# Patient Record
Sex: MALE | Race: WHITE | Employment: OTHER | URBAN - METROPOLITAN AREA
[De-identification: names, ages, dates, MRNs, and addresses within clinical notes are randomized per-mention and may not be internally consistent; named-entity substitution may affect disease eponyms.]

---

## 2018-09-17 LAB
AVERAGE GLUCOSE: NORMAL
CHOLESTEROL, TOTAL: 154 MG/DL
CHOLESTEROL/HDL RATIO: 4.3
HBA1C MFR BLD: 6.1 %
HDLC SERPL-MCNC: 36 MG/DL (ref 35–70)
LDL CHOLESTEROL CALCULATED: 86 MG/DL (ref 0–160)
TRIGL SERPL-MCNC: 160 MG/DL
VLDLC SERPL CALC-MCNC: 32 MG/DL

## 2019-02-26 ENCOUNTER — APPOINTMENT (OUTPATIENT)
Dept: CT IMAGING | Age: 69
End: 2019-02-26
Payer: COMMERCIAL

## 2019-02-26 ENCOUNTER — HOSPITAL ENCOUNTER (EMERGENCY)
Age: 69
Discharge: HOME | End: 2019-02-26
Attending: EMERGENCY MEDICINE
Payer: COMMERCIAL

## 2019-02-26 VITALS
RESPIRATION RATE: 16 BRPM | TEMPERATURE: 98.1 F | HEIGHT: 74 IN | BODY MASS INDEX: 40.43 KG/M2 | HEART RATE: 85 BPM | SYSTOLIC BLOOD PRESSURE: 124 MMHG | OXYGEN SATURATION: 100 % | WEIGHT: 315 LBS | DIASTOLIC BLOOD PRESSURE: 102 MMHG

## 2019-02-26 DIAGNOSIS — H53.9 VISUAL DISTURBANCE: Primary | ICD-10-CM

## 2019-02-26 LAB
ALBUMIN SERPL-MCNC: 3.7 GM/DL (ref 3.4–5)
ALP BLD-CCNC: 44 IU/L (ref 40–129)
ALT SERPL-CCNC: 16 U/L (ref 10–40)
ANION GAP SERPL CALCULATED.3IONS-SCNC: 11 MMOL/L (ref 4–16)
APTT: 30.3 SECONDS (ref 21.2–33)
AST SERPL-CCNC: 24 IU/L (ref 15–37)
BASOPHILS ABSOLUTE: 0.1 K/CU MM
BASOPHILS RELATIVE PERCENT: 0.5 % (ref 0–1)
BILIRUB SERPL-MCNC: 1.1 MG/DL (ref 0–1)
BUN BLDV-MCNC: 14 MG/DL (ref 6–23)
CALCIUM SERPL-MCNC: 8.4 MG/DL (ref 8.3–10.6)
CHLORIDE BLD-SCNC: 101 MMOL/L (ref 99–110)
CO2: 25 MMOL/L (ref 21–32)
CREAT SERPL-MCNC: 0.8 MG/DL (ref 0.9–1.3)
DIFFERENTIAL TYPE: ABNORMAL
EOSINOPHILS ABSOLUTE: 0.1 K/CU MM
EOSINOPHILS RELATIVE PERCENT: 1.3 % (ref 0–3)
GFR AFRICAN AMERICAN: >60 ML/MIN/1.73M2
GFR NON-AFRICAN AMERICAN: >60 ML/MIN/1.73M2
GLUCOSE BLD-MCNC: 106 MG/DL (ref 70–99)
HCT VFR BLD CALC: 52.3 % (ref 42–52)
HEMOGLOBIN: 16.8 GM/DL (ref 13.5–18)
IMMATURE NEUTROPHIL %: 0.4 % (ref 0–0.43)
INR BLD: 2.44 INDEX
LYMPHOCYTES ABSOLUTE: 3 K/CU MM
LYMPHOCYTES RELATIVE PERCENT: 32.6 % (ref 24–44)
MCH RBC QN AUTO: 30.8 PG (ref 27–31)
MCHC RBC AUTO-ENTMCNC: 32.1 % (ref 32–36)
MCV RBC AUTO: 95.8 FL (ref 78–100)
MONOCYTES ABSOLUTE: 0.8 K/CU MM
MONOCYTES RELATIVE PERCENT: 8.6 % (ref 0–4)
NUCLEATED RBC %: 0 %
PDW BLD-RTO: 13.2 % (ref 11.7–14.9)
PLATELET # BLD: 224 K/CU MM (ref 140–440)
PMV BLD AUTO: 9.2 FL (ref 7.5–11.1)
POTASSIUM SERPL-SCNC: 3.8 MMOL/L (ref 3.5–5.1)
PROTHROMBIN TIME: 28.1 SECONDS (ref 9.12–12.5)
RBC # BLD: 5.46 M/CU MM (ref 4.6–6.2)
SEGMENTED NEUTROPHILS ABSOLUTE COUNT: 5.2 K/CU MM
SEGMENTED NEUTROPHILS RELATIVE PERCENT: 56.6 % (ref 36–66)
SODIUM BLD-SCNC: 137 MMOL/L (ref 135–145)
TOTAL IMMATURE NEUTOROPHIL: 0.04 K/CU MM
TOTAL NUCLEATED RBC: 0 K/CU MM
TOTAL PROTEIN: 6.8 GM/DL (ref 6.4–8.2)
WBC # BLD: 9.1 K/CU MM (ref 4–10.5)

## 2019-02-26 PROCEDURE — 85730 THROMBOPLASTIN TIME PARTIAL: CPT

## 2019-02-26 PROCEDURE — 80053 COMPREHEN METABOLIC PANEL: CPT

## 2019-02-26 PROCEDURE — 70450 CT HEAD/BRAIN W/O DYE: CPT

## 2019-02-26 PROCEDURE — 99284 EMERGENCY DEPT VISIT MOD MDM: CPT

## 2019-02-26 PROCEDURE — 93005 ELECTROCARDIOGRAM TRACING: CPT | Performed by: EMERGENCY MEDICINE

## 2019-02-26 PROCEDURE — 93010 ELECTROCARDIOGRAM REPORT: CPT | Performed by: INTERNAL MEDICINE

## 2019-02-26 PROCEDURE — 85610 PROTHROMBIN TIME: CPT

## 2019-02-26 PROCEDURE — 36415 COLL VENOUS BLD VENIPUNCTURE: CPT

## 2019-02-26 PROCEDURE — 85025 COMPLETE CBC W/AUTO DIFF WBC: CPT

## 2019-03-04 LAB
EKG ATRIAL RATE: 288 BPM
EKG DIAGNOSIS: NORMAL
EKG Q-T INTERVAL: 426 MS
EKG QRS DURATION: 96 MS
EKG QTC CALCULATION (BAZETT): 460 MS
EKG R AXIS: -39 DEGREES
EKG T AXIS: 28 DEGREES
EKG VENTRICULAR RATE: 70 BPM

## 2019-03-07 ENCOUNTER — HOSPITAL ENCOUNTER (OUTPATIENT)
Dept: ULTRASOUND IMAGING | Age: 69
Discharge: HOME | End: 2019-03-07
Payer: COMMERCIAL

## 2019-03-07 DIAGNOSIS — I10 BENIGN HYPERTENSION: ICD-10-CM

## 2019-03-07 DIAGNOSIS — H53.9 UNSPECIFIED VISUAL DISTURBANCE: ICD-10-CM

## 2019-03-07 PROCEDURE — 93880 EXTRACRANIAL BILAT STUDY: CPT

## 2019-05-07 DIAGNOSIS — I48.0 PAROXYSMAL ATRIAL FIBRILLATION (HCC): Primary | ICD-10-CM

## 2019-05-07 RX ORDER — LOSARTAN POTASSIUM AND HYDROCHLOROTHIAZIDE 12.5; 5 MG/1; MG/1
2 TABLET ORAL DAILY
Qty: 30 TABLET | Refills: 5 | Status: SHIPPED | OUTPATIENT
Start: 2019-05-07 | End: 2019-05-09 | Stop reason: ALTCHOICE

## 2019-05-07 RX ORDER — POTASSIUM CHLORIDE 20 MEQ/1
20 TABLET, EXTENDED RELEASE ORAL DAILY
Qty: 30 TABLET | Refills: 5 | Status: SHIPPED | OUTPATIENT
Start: 2019-05-07 | End: 2019-10-29 | Stop reason: SDUPTHER

## 2019-05-07 RX ORDER — AMLODIPINE BESYLATE 5 MG/1
5 TABLET ORAL DAILY
Qty: 30 TABLET | Refills: 5 | Status: SHIPPED | OUTPATIENT
Start: 2019-05-07 | End: 2019-10-29 | Stop reason: SDUPTHER

## 2019-05-07 RX ORDER — WARFARIN SODIUM 5 MG/1
5 TABLET ORAL DAILY
COMMUNITY
End: 2019-05-07 | Stop reason: SDUPTHER

## 2019-05-07 RX ORDER — WARFARIN SODIUM 5 MG/1
5 TABLET ORAL DAILY
Qty: 30 TABLET | Refills: 1 | Status: SHIPPED | OUTPATIENT
Start: 2019-05-07 | End: 2019-05-29

## 2019-05-07 NOTE — TELEPHONE ENCOUNTER
Carmen Villavicencio called needing the following prescription refills:   Requested Prescriptions     Pending Prescriptions Disp Refills    amLODIPine (NORVASC) 5 MG tablet 30 tablet 5     Sig: Take 1 tablet by mouth daily    losartan-hydrochlorothiazide (HYZAAR) 50-12.5 MG per tablet 30 tablet 5     Sig: Take 2 tablets by mouth daily    potassium chloride (KLOR-CON M) 20 MEQ extended release tablet 30 tablet 5     Sig: Take 1 tablet by mouth daily    warfarin (COUMADIN) 5 MG tablet       Sig: Take 1 tablet by mouth daily     ([facility])  Medications were reviewed and appropriate refills were sent to the requested pharmacy after provider approval.     Electronically signed by Todd Degroot MA on 5/7/19 at 9:06 AM

## 2019-05-09 RX ORDER — LOSARTAN POTASSIUM AND HYDROCHLOROTHIAZIDE 25; 100 MG/1; MG/1
1 TABLET ORAL DAILY
Qty: 30 TABLET | Refills: 5 | Status: SHIPPED | OUTPATIENT
Start: 2019-05-09 | End: 2019-11-04 | Stop reason: SDUPTHER

## 2019-05-15 ENCOUNTER — NURSE ONLY (OUTPATIENT)
Dept: FAMILY MEDICINE CLINIC | Age: 69
End: 2019-05-15
Payer: COMMERCIAL

## 2019-05-15 DIAGNOSIS — I48.20 CHRONIC ATRIAL FIBRILLATION (HCC): Primary | ICD-10-CM

## 2019-05-15 LAB
INTERNATIONAL NORMALIZATION RATIO, POC: NORMAL
PROTHROMBIN TIME, POC: NORMAL

## 2019-05-15 PROCEDURE — 85610 PROTHROMBIN TIME: CPT | Performed by: FAMILY MEDICINE

## 2019-05-15 NOTE — PROGRESS NOTES
PT INR 1.6, PER DR QUIÑONEZ CHANGE TO 2.5MG EVERY DAY, RECHECK 2 WEEKS.  NOTE FYI: PT HAD KNEE SURGERY 2 WEEKS AGO AND WAS OFF COUMADIN A FEW DAYS  Electronically signed by Hong Vazquez MA on 5/15/2019 at 1:44 PM

## 2019-05-24 DIAGNOSIS — N52.9 IMPOTENCE: ICD-10-CM

## 2019-05-24 DIAGNOSIS — G47.33 OBSTRUCTIVE SLEEP APNEA SYNDROME: ICD-10-CM

## 2019-05-24 DIAGNOSIS — I87.2 PERIPHERAL VENOUS INSUFFICIENCY: ICD-10-CM

## 2019-05-24 DIAGNOSIS — I42.9 CARDIOMYOPATHY, UNSPECIFIED TYPE (HCC): ICD-10-CM

## 2019-05-24 DIAGNOSIS — E78.5 HYPERLIPIDEMIA, UNSPECIFIED HYPERLIPIDEMIA TYPE: ICD-10-CM

## 2019-05-24 DIAGNOSIS — I48.91 ATRIAL FIBRILLATION, UNSPECIFIED TYPE (HCC): ICD-10-CM

## 2019-05-24 DIAGNOSIS — J30.9 ALLERGIC RHINITIS, UNSPECIFIED SEASONALITY, UNSPECIFIED TRIGGER: ICD-10-CM

## 2019-05-24 DIAGNOSIS — R59.0 HILAR LYMPHADENOPATHY: ICD-10-CM

## 2019-05-24 RX ORDER — TURMERIC ROOT EXTRACT 500 MG
1 TABLET ORAL
COMMUNITY

## 2019-05-24 RX ORDER — CHLORAL HYDRATE 500 MG
1 CAPSULE ORAL
COMMUNITY

## 2019-05-24 RX ORDER — AZELASTINE HYDROCHLORIDE 0.5 MG/ML
1 SOLUTION/ DROPS OPHTHALMIC 2 TIMES DAILY
COMMUNITY
End: 2019-11-04 | Stop reason: SDUPTHER

## 2019-05-24 RX ORDER — FLUTICASONE PROPIONATE 50 MCG
2 SPRAY, SUSPENSION (ML) NASAL DAILY
COMMUNITY

## 2019-05-29 ENCOUNTER — NURSE ONLY (OUTPATIENT)
Dept: FAMILY MEDICINE CLINIC | Age: 69
End: 2019-05-29
Payer: COMMERCIAL

## 2019-05-29 DIAGNOSIS — I48.0 PAROXYSMAL ATRIAL FIBRILLATION (HCC): ICD-10-CM

## 2019-05-29 DIAGNOSIS — I48.91 ATRIAL FIBRILLATION, UNSPECIFIED TYPE (HCC): Primary | ICD-10-CM

## 2019-05-29 LAB
INTERNATIONAL NORMALIZATION RATIO, POC: 2.8
PROTHROMBIN TIME, POC: NORMAL

## 2019-05-29 PROCEDURE — 85610 PROTHROMBIN TIME: CPT | Performed by: FAMILY MEDICINE

## 2019-05-29 RX ORDER — WARFARIN SODIUM 5 MG/1
TABLET ORAL
Qty: 30 TABLET | Refills: 0 | Status: SHIPPED | OUTPATIENT
Start: 2019-05-29 | End: 2019-06-25 | Stop reason: SDUPTHER

## 2019-05-29 RX ORDER — WARFARIN SODIUM 5 MG/1
TABLET ORAL
Qty: 30 TABLET | Refills: 0 | Status: SHIPPED | OUTPATIENT
Start: 2019-05-29 | End: 2019-06-26

## 2019-05-29 NOTE — PROGRESS NOTES
INR = 2.8. CURRENT COUMADING DOSAGE IS 2.5MG QD.  LAST INR WAS 1.6. PER DR. QUIÑONEZ, CHANGE TO 5 MG 6 DAYS A WEEK AND RECHECK IN 3 WEEKS. PATIENT VOICED UNDERSTANDING. PATIENT REQUESTED REFILL FOR COUMADIN 5MG BE SENT TO [facility].

## 2019-06-19 ENCOUNTER — NURSE ONLY (OUTPATIENT)
Dept: FAMILY MEDICINE CLINIC | Age: 69
End: 2019-06-19

## 2019-06-19 ENCOUNTER — ANTI-COAG VISIT (OUTPATIENT)
Dept: FAMILY MEDICINE CLINIC | Age: 69
End: 2019-06-19
Payer: COMMERCIAL

## 2019-06-19 DIAGNOSIS — I48.91 ATRIAL FIBRILLATION, UNSPECIFIED TYPE (HCC): ICD-10-CM

## 2019-06-19 LAB
INTERNATIONAL NORMALIZATION RATIO, POC: 2.2
INTERNATIONAL NORMALIZATION RATIO, POC: NORMAL
PROTHROMBIN TIME, POC: 2.2

## 2019-06-19 PROCEDURE — 85610 PROTHROMBIN TIME: CPT | Performed by: FAMILY MEDICINE

## 2019-06-25 DIAGNOSIS — I48.91 ATRIAL FIBRILLATION, UNSPECIFIED TYPE (HCC): Primary | ICD-10-CM

## 2019-06-26 RX ORDER — WARFARIN SODIUM 5 MG/1
TABLET ORAL
Qty: 30 TABLET | Refills: 0 | Status: SHIPPED | OUTPATIENT
Start: 2019-06-26 | End: 2019-07-19 | Stop reason: SDUPTHER

## 2019-06-26 NOTE — TELEPHONE ENCOUNTER
Rupert Arredondo called needing the following prescription refills:   Requested Prescriptions     Pending Prescriptions Disp Refills    warfarin (COUMADIN) 5 MG tablet [Pharmacy Med Name: WARFARIN SODIUM 5 MG TABLET] 30 tablet 0     Sig: TAKE 1/2 TO 1 TABLET DAILY PER INR RESULTS.        Medications were reviewed and appropriate refills were sent to the requested pharmacy after provider approval.     Electronically signed by Dea Armstrong LPN on 9/90/62 at 9:22 AM

## 2019-07-19 DIAGNOSIS — I48.91 ATRIAL FIBRILLATION, UNSPECIFIED TYPE (HCC): ICD-10-CM

## 2019-07-19 RX ORDER — WARFARIN SODIUM 5 MG/1
TABLET ORAL
Qty: 30 TABLET | Refills: 0 | Status: SHIPPED | OUTPATIENT
Start: 2019-07-19 | End: 2019-08-12 | Stop reason: SDUPTHER

## 2019-07-24 ENCOUNTER — NURSE ONLY (OUTPATIENT)
Dept: FAMILY MEDICINE CLINIC | Age: 69
End: 2019-07-24
Payer: COMMERCIAL

## 2019-07-24 DIAGNOSIS — I48.91 ATRIAL FIBRILLATION, UNSPECIFIED TYPE (HCC): ICD-10-CM

## 2019-07-24 LAB
INR BLD: 2.1
INTERNATIONAL NORMALIZATION RATIO, POC: 2.1
PROTHROMBIN TIME, POC: NORMAL

## 2019-07-24 PROCEDURE — 85610 PROTHROMBIN TIME: CPT | Performed by: FAMILY MEDICINE

## 2019-07-24 ASSESSMENT — PATIENT HEALTH QUESTIONNAIRE - PHQ9
1. LITTLE INTEREST OR PLEASURE IN DOING THINGS: 0
SUM OF ALL RESPONSES TO PHQ9 QUESTIONS 1 & 2: 0
SUM OF ALL RESPONSES TO PHQ QUESTIONS 1-9: 0
SUM OF ALL RESPONSES TO PHQ QUESTIONS 1-9: 0
2. FEELING DOWN, DEPRESSED OR HOPELESS: 0

## 2019-08-12 DIAGNOSIS — I48.91 ATRIAL FIBRILLATION, UNSPECIFIED TYPE (HCC): ICD-10-CM

## 2019-08-13 RX ORDER — WARFARIN SODIUM 5 MG/1
TABLET ORAL
Qty: 30 TABLET | Refills: 0 | Status: SHIPPED | OUTPATIENT
Start: 2019-08-13 | End: 2019-11-19 | Stop reason: SDUPTHER

## 2019-08-21 ENCOUNTER — NURSE ONLY (OUTPATIENT)
Dept: FAMILY MEDICINE CLINIC | Age: 69
End: 2019-08-21
Payer: COMMERCIAL

## 2019-08-21 DIAGNOSIS — I48.91 ATRIAL FIBRILLATION, UNSPECIFIED TYPE (HCC): ICD-10-CM

## 2019-08-21 LAB
INTERNATIONAL NORMALIZATION RATIO, POC: 2.2
PROTHROMBIN TIME, POC: NORMAL

## 2019-08-21 PROCEDURE — 85610 PROTHROMBIN TIME: CPT | Performed by: FAMILY MEDICINE

## 2019-08-21 NOTE — PROGRESS NOTES
INR 2.2. CURRENT DOSE OF COUMADIN IS 2.5 MG X 6 DAYS AND NONE ON 7 TH DAY. CONTINUE SAME DOSE AND RECHECK IN 4 WEEKS.

## 2019-09-24 ENCOUNTER — NURSE ONLY (OUTPATIENT)
Dept: FAMILY MEDICINE CLINIC | Age: 69
End: 2019-09-24
Payer: COMMERCIAL

## 2019-09-24 DIAGNOSIS — I48.91 ATRIAL FIBRILLATION, UNSPECIFIED TYPE (HCC): ICD-10-CM

## 2019-09-24 LAB — INTERNATIONAL NORMALIZATION RATIO, POC: 2.3

## 2019-09-24 PROCEDURE — 85610 PROTHROMBIN TIME: CPT | Performed by: FAMILY MEDICINE

## 2019-10-22 ENCOUNTER — ANTI-COAG VISIT (OUTPATIENT)
Dept: FAMILY MEDICINE CLINIC | Age: 69
End: 2019-10-22

## 2019-10-22 ENCOUNTER — NURSE ONLY (OUTPATIENT)
Dept: FAMILY MEDICINE CLINIC | Age: 69
End: 2019-10-22
Payer: COMMERCIAL

## 2019-10-22 DIAGNOSIS — I48.91 ATRIAL FIBRILLATION, UNSPECIFIED TYPE (HCC): ICD-10-CM

## 2019-10-22 LAB
INTERNATIONAL NORMALIZATION RATIO, POC: 2.3
PROTHROMBIN TIME, POC: NORMAL

## 2019-10-22 PROCEDURE — 85610 PROTHROMBIN TIME: CPT | Performed by: FAMILY MEDICINE

## 2019-10-31 RX ORDER — AMLODIPINE BESYLATE 5 MG/1
TABLET ORAL
Qty: 90 TABLET | Refills: 1 | Status: SHIPPED | OUTPATIENT
Start: 2019-10-31 | End: 2019-11-04 | Stop reason: SDUPTHER

## 2019-10-31 RX ORDER — POTASSIUM CHLORIDE 1500 MG/1
TABLET, EXTENDED RELEASE ORAL
Qty: 90 TABLET | Refills: 1 | Status: SHIPPED | OUTPATIENT
Start: 2019-10-31 | End: 2019-11-04 | Stop reason: SDUPTHER

## 2019-11-04 ENCOUNTER — TELEPHONE (OUTPATIENT)
Dept: FAMILY MEDICINE CLINIC | Age: 69
End: 2019-11-04

## 2019-11-04 RX ORDER — AMLODIPINE BESYLATE 5 MG/1
TABLET ORAL
Qty: 30 TABLET | Refills: 0 | Status: SHIPPED | OUTPATIENT
Start: 2019-11-04 | End: 2019-11-19 | Stop reason: SDUPTHER

## 2019-11-04 RX ORDER — LOSARTAN POTASSIUM AND HYDROCHLOROTHIAZIDE 25; 100 MG/1; MG/1
1 TABLET ORAL DAILY
Qty: 30 TABLET | Refills: 0 | Status: SHIPPED | OUTPATIENT
Start: 2019-11-04 | End: 2019-11-19 | Stop reason: SDUPTHER

## 2019-11-04 RX ORDER — AZELASTINE HYDROCHLORIDE 0.5 MG/ML
1 SOLUTION/ DROPS OPHTHALMIC 2 TIMES DAILY
Qty: 1 BOTTLE | Refills: 0 | Status: SHIPPED | OUTPATIENT
Start: 2019-11-04 | End: 2019-11-19 | Stop reason: SDUPTHER

## 2019-11-04 RX ORDER — POTASSIUM CHLORIDE 20 MEQ/1
TABLET, EXTENDED RELEASE ORAL
Qty: 30 TABLET | Refills: 0 | Status: SHIPPED | OUTPATIENT
Start: 2019-11-04 | End: 2019-11-19 | Stop reason: SDUPTHER

## 2019-11-19 ENCOUNTER — OFFICE VISIT (OUTPATIENT)
Dept: FAMILY MEDICINE CLINIC | Age: 69
End: 2019-11-19
Payer: COMMERCIAL

## 2019-11-19 VITALS
OXYGEN SATURATION: 96 % | SYSTOLIC BLOOD PRESSURE: 120 MMHG | HEART RATE: 63 BPM | BODY MASS INDEX: 40.43 KG/M2 | HEIGHT: 74 IN | DIASTOLIC BLOOD PRESSURE: 82 MMHG | WEIGHT: 315 LBS

## 2019-11-19 DIAGNOSIS — I48.91 ATRIAL FIBRILLATION, UNSPECIFIED TYPE (HCC): Primary | ICD-10-CM

## 2019-11-19 DIAGNOSIS — E78.5 HYPERLIPIDEMIA, UNSPECIFIED HYPERLIPIDEMIA TYPE: ICD-10-CM

## 2019-11-19 DIAGNOSIS — Z86.010 HISTORY OF COLON POLYPS: ICD-10-CM

## 2019-11-19 DIAGNOSIS — I10 ESSENTIAL HYPERTENSION: ICD-10-CM

## 2019-11-19 LAB
INTERNATIONAL NORMALIZATION RATIO, POC: 1.6
PROTHROMBIN TIME, POC: NORMAL

## 2019-11-19 PROCEDURE — 1036F TOBACCO NON-USER: CPT | Performed by: PHYSICIAN ASSISTANT

## 2019-11-19 PROCEDURE — 85610 PROTHROMBIN TIME: CPT | Performed by: PHYSICIAN ASSISTANT

## 2019-11-19 PROCEDURE — G8427 DOCREV CUR MEDS BY ELIG CLIN: HCPCS | Performed by: PHYSICIAN ASSISTANT

## 2019-11-19 PROCEDURE — 3017F COLORECTAL CA SCREEN DOC REV: CPT | Performed by: PHYSICIAN ASSISTANT

## 2019-11-19 PROCEDURE — 99024 POSTOP FOLLOW-UP VISIT: CPT | Performed by: PHYSICIAN ASSISTANT

## 2019-11-19 PROCEDURE — 1123F ACP DISCUSS/DSCN MKR DOCD: CPT | Performed by: PHYSICIAN ASSISTANT

## 2019-11-19 PROCEDURE — G8484 FLU IMMUNIZE NO ADMIN: HCPCS | Performed by: PHYSICIAN ASSISTANT

## 2019-11-19 PROCEDURE — 4040F PNEUMOC VAC/ADMIN/RCVD: CPT | Performed by: PHYSICIAN ASSISTANT

## 2019-11-19 PROCEDURE — G8417 CALC BMI ABV UP PARAM F/U: HCPCS | Performed by: PHYSICIAN ASSISTANT

## 2019-11-19 RX ORDER — WARFARIN SODIUM 5 MG/1
TABLET ORAL
Qty: 45 TABLET | Refills: 1 | Status: SHIPPED | OUTPATIENT
Start: 2019-11-19 | End: 2020-04-06 | Stop reason: SDUPTHER

## 2019-11-19 RX ORDER — POTASSIUM CHLORIDE 20 MEQ/1
TABLET, EXTENDED RELEASE ORAL
Qty: 90 TABLET | Refills: 1 | Status: SHIPPED | OUTPATIENT
Start: 2019-11-19 | End: 2020-05-19 | Stop reason: SDUPTHER

## 2019-11-19 RX ORDER — AMLODIPINE BESYLATE 5 MG/1
TABLET ORAL
Qty: 90 TABLET | Refills: 1 | Status: SHIPPED | OUTPATIENT
Start: 2019-11-19 | End: 2020-05-19 | Stop reason: SDUPTHER

## 2019-11-19 RX ORDER — LOSARTAN POTASSIUM AND HYDROCHLOROTHIAZIDE 25; 100 MG/1; MG/1
1 TABLET ORAL DAILY
Qty: 90 TABLET | Refills: 1 | Status: SHIPPED | OUTPATIENT
Start: 2019-11-19 | End: 2019-12-05

## 2019-11-19 RX ORDER — AZELASTINE HYDROCHLORIDE 0.5 MG/ML
1 SOLUTION/ DROPS OPHTHALMIC 2 TIMES DAILY
Qty: 1 BOTTLE | Refills: 5 | Status: SHIPPED | OUTPATIENT
Start: 2019-11-19 | End: 2021-11-15 | Stop reason: SDUPTHER

## 2019-11-20 LAB
INTERNATIONAL NORMALIZATION RATIO, POC: 1.6
PROTHROMBIN TIME, POC: NORMAL

## 2019-12-03 ENCOUNTER — NURSE ONLY (OUTPATIENT)
Dept: FAMILY MEDICINE CLINIC | Age: 69
End: 2019-12-03
Payer: COMMERCIAL

## 2019-12-03 ENCOUNTER — TELEPHONE (OUTPATIENT)
Dept: FAMILY MEDICINE CLINIC | Age: 69
End: 2019-12-03

## 2019-12-03 DIAGNOSIS — I10 ESSENTIAL HYPERTENSION: ICD-10-CM

## 2019-12-03 DIAGNOSIS — E78.5 HYPERLIPIDEMIA, UNSPECIFIED HYPERLIPIDEMIA TYPE: ICD-10-CM

## 2019-12-03 DIAGNOSIS — I48.91 ATRIAL FIBRILLATION, UNSPECIFIED TYPE (HCC): ICD-10-CM

## 2019-12-03 LAB
A/G RATIO: 1.4 (ref 1.1–2.2)
ALBUMIN SERPL-MCNC: 4.1 G/DL (ref 3.4–5)
ALP BLD-CCNC: 68 U/L (ref 40–129)
ALT SERPL-CCNC: 19 U/L (ref 10–40)
ANION GAP SERPL CALCULATED.3IONS-SCNC: 19 MMOL/L (ref 3–16)
AST SERPL-CCNC: 22 U/L (ref 15–37)
BILIRUB SERPL-MCNC: 2 MG/DL (ref 0–1)
BUN BLDV-MCNC: 14 MG/DL (ref 7–20)
CALCIUM SERPL-MCNC: 9.6 MG/DL (ref 8.3–10.6)
CHLORIDE BLD-SCNC: 97 MMOL/L (ref 99–110)
CHOLESTEROL, TOTAL: 140 MG/DL (ref 0–199)
CO2: 24 MMOL/L (ref 21–32)
CREAT SERPL-MCNC: 0.6 MG/DL (ref 0.8–1.3)
GFR AFRICAN AMERICAN: >60
GFR NON-AFRICAN AMERICAN: >60
GLOBULIN: 3 G/DL
GLUCOSE BLD-MCNC: 103 MG/DL (ref 70–99)
HDLC SERPL-MCNC: 54 MG/DL (ref 40–60)
INTERNATIONAL NORMALIZATION RATIO, POC: 2.4
LDL CHOLESTEROL CALCULATED: 74 MG/DL
POTASSIUM SERPL-SCNC: 4 MMOL/L (ref 3.5–5.1)
PROTHROMBIN TIME, POC: NORMAL
SODIUM BLD-SCNC: 140 MMOL/L (ref 136–145)
TOTAL PROTEIN: 7.1 G/DL (ref 6.4–8.2)
TRIGL SERPL-MCNC: 60 MG/DL (ref 0–150)
VLDLC SERPL CALC-MCNC: 12 MG/DL

## 2019-12-03 PROCEDURE — 99024 POSTOP FOLLOW-UP VISIT: CPT | Performed by: FAMILY MEDICINE

## 2019-12-03 PROCEDURE — 85610 PROTHROMBIN TIME: CPT | Performed by: FAMILY MEDICINE

## 2019-12-03 RX ORDER — COLCHICINE 0.6 MG/1
TABLET ORAL
Qty: 25 TABLET | Refills: 1 | Status: SHIPPED | OUTPATIENT
Start: 2019-12-03 | End: 2021-03-18 | Stop reason: ALTCHOICE

## 2019-12-05 DIAGNOSIS — I10 ESSENTIAL HYPERTENSION: ICD-10-CM

## 2019-12-05 RX ORDER — HYDROCHLOROTHIAZIDE 25 MG/1
25 TABLET ORAL DAILY
Qty: 90 TABLET | Refills: 1 | Status: SHIPPED | OUTPATIENT
Start: 2019-12-05 | End: 2020-05-19 | Stop reason: SDUPTHER

## 2019-12-05 RX ORDER — HYDROCHLOROTHIAZIDE 25 MG/1
25 TABLET ORAL DAILY
COMMUNITY
End: 2019-12-05 | Stop reason: SDUPTHER

## 2019-12-05 RX ORDER — LOSARTAN POTASSIUM 100 MG/1
100 TABLET ORAL DAILY
Qty: 90 TABLET | Refills: 1 | Status: SHIPPED | OUTPATIENT
Start: 2019-12-05 | End: 2020-05-19 | Stop reason: SDUPTHER

## 2019-12-05 RX ORDER — LOSARTAN POTASSIUM 100 MG/1
100 TABLET ORAL DAILY
COMMUNITY
End: 2019-12-05 | Stop reason: SDUPTHER

## 2019-12-05 RX ORDER — LOSARTAN POTASSIUM AND HYDROCHLOROTHIAZIDE 25; 100 MG/1; MG/1
1 TABLET ORAL DAILY
Qty: 90 TABLET | Refills: 1 | Status: CANCELLED | OUTPATIENT
Start: 2019-12-05

## 2019-12-11 ENCOUNTER — TELEPHONE (OUTPATIENT)
Dept: FAMILY MEDICINE CLINIC | Age: 69
End: 2019-12-11

## 2019-12-11 NOTE — TELEPHONE ENCOUNTER
RETURNING CALL ---HE IS A ----GETS OFF AT 1:00 ---YOU CAN CALL HIM ON HIS CELL PHONE AFTER 1:00 -[phone].       IF YOU CALL RIGHT NOW, HE IS IN HIS OFFICE -[phone],

## 2019-12-13 DIAGNOSIS — E80.6 HYPERBILIRUBINEMIA: Primary | ICD-10-CM

## 2020-01-02 ENCOUNTER — NURSE ONLY (OUTPATIENT)
Dept: FAMILY MEDICINE CLINIC | Age: 70
End: 2020-01-02
Payer: COMMERCIAL

## 2020-01-02 LAB
INTERNATIONAL NORMALIZATION RATIO, POC: 2.8
PROTHROMBIN TIME, POC: NORMAL

## 2020-01-02 PROCEDURE — 85610 PROTHROMBIN TIME: CPT | Performed by: FAMILY MEDICINE

## 2020-01-10 DIAGNOSIS — E80.6 HYPERBILIRUBINEMIA: ICD-10-CM

## 2020-01-10 LAB
A/G RATIO: 1.2 (ref 1.1–2.2)
ALBUMIN SERPL-MCNC: 4.2 G/DL (ref 3.4–5)
ALP BLD-CCNC: 60 U/L (ref 40–129)
ALT SERPL-CCNC: 20 U/L (ref 10–40)
ANION GAP SERPL CALCULATED.3IONS-SCNC: 17 MMOL/L (ref 3–16)
AST SERPL-CCNC: 29 U/L (ref 15–37)
BILIRUB SERPL-MCNC: 1 MG/DL (ref 0–1)
BUN BLDV-MCNC: 16 MG/DL (ref 7–20)
CALCIUM SERPL-MCNC: 9.5 MG/DL (ref 8.3–10.6)
CHLORIDE BLD-SCNC: 97 MMOL/L (ref 99–110)
CO2: 24 MMOL/L (ref 21–32)
CREAT SERPL-MCNC: 0.7 MG/DL (ref 0.8–1.3)
GFR AFRICAN AMERICAN: >60
GFR NON-AFRICAN AMERICAN: >60
GLOBULIN: 3.4 G/DL
GLUCOSE BLD-MCNC: 97 MG/DL (ref 70–99)
POTASSIUM SERPL-SCNC: 4.1 MMOL/L (ref 3.5–5.1)
SODIUM BLD-SCNC: 138 MMOL/L (ref 136–145)
TOTAL PROTEIN: 7.6 G/DL (ref 6.4–8.2)

## 2020-02-03 ENCOUNTER — NURSE ONLY (OUTPATIENT)
Dept: FAMILY MEDICINE CLINIC | Age: 70
End: 2020-02-03
Payer: COMMERCIAL

## 2020-02-03 LAB
INTERNATIONAL NORMALIZATION RATIO, POC: 2.6
PROTHROMBIN TIME, POC: NORMAL

## 2020-02-03 PROCEDURE — 85610 PROTHROMBIN TIME: CPT | Performed by: FAMILY MEDICINE

## 2020-03-03 ENCOUNTER — NURSE ONLY (OUTPATIENT)
Dept: FAMILY MEDICINE CLINIC | Age: 70
End: 2020-03-03
Payer: COMMERCIAL

## 2020-03-03 ENCOUNTER — ANTI-COAG VISIT (OUTPATIENT)
Dept: FAMILY MEDICINE CLINIC | Age: 70
End: 2020-03-03

## 2020-03-03 LAB
INTERNATIONAL NORMALIZATION RATIO, POC: 2.5
PROTHROMBIN TIME, POC: NORMAL

## 2020-03-03 PROCEDURE — 85610 PROTHROMBIN TIME: CPT | Performed by: FAMILY MEDICINE

## 2020-04-02 ENCOUNTER — NURSE ONLY (OUTPATIENT)
Dept: FAMILY MEDICINE CLINIC | Age: 70
End: 2020-04-02
Payer: COMMERCIAL

## 2020-04-02 ENCOUNTER — ANTI-COAG VISIT (OUTPATIENT)
Dept: FAMILY MEDICINE CLINIC | Age: 70
End: 2020-04-02

## 2020-04-02 LAB
INTERNATIONAL NORMALIZATION RATIO, POC: 3
PROTHROMBIN TIME, POC: NORMAL

## 2020-04-02 PROCEDURE — 85610 PROTHROMBIN TIME: CPT | Performed by: FAMILY MEDICINE

## 2020-04-06 RX ORDER — WARFARIN SODIUM 5 MG/1
TABLET ORAL
Qty: 45 TABLET | Refills: 1 | Status: SHIPPED | OUTPATIENT
Start: 2020-04-06 | End: 2020-09-29

## 2020-04-23 ENCOUNTER — NURSE ONLY (OUTPATIENT)
Dept: FAMILY MEDICINE CLINIC | Age: 70
End: 2020-04-23
Payer: COMMERCIAL

## 2020-04-23 LAB
INTERNATIONAL NORMALIZATION RATIO, POC: 2.5
PROTHROMBIN TIME, POC: NORMAL

## 2020-04-23 PROCEDURE — 85610 PROTHROMBIN TIME: CPT | Performed by: FAMILY MEDICINE

## 2020-04-23 PROCEDURE — 99024 POSTOP FOLLOW-UP VISIT: CPT | Performed by: FAMILY MEDICINE

## 2020-05-19 ENCOUNTER — OFFICE VISIT (OUTPATIENT)
Dept: FAMILY MEDICINE CLINIC | Age: 70
End: 2020-05-19
Payer: COMMERCIAL

## 2020-05-19 VITALS
HEART RATE: 66 BPM | OXYGEN SATURATION: 93 % | DIASTOLIC BLOOD PRESSURE: 72 MMHG | TEMPERATURE: 97.3 F | BODY MASS INDEX: 40.43 KG/M2 | SYSTOLIC BLOOD PRESSURE: 110 MMHG | WEIGHT: 315 LBS | HEIGHT: 74 IN

## 2020-05-19 PROBLEM — M1A.09X0 IDIOPATHIC CHRONIC GOUT OF MULTIPLE SITES WITHOUT TOPHUS: Status: ACTIVE | Noted: 2020-05-19

## 2020-05-19 PROBLEM — E66.01 CLASS 3 SEVERE OBESITY DUE TO EXCESS CALORIES WITHOUT SERIOUS COMORBIDITY WITH BODY MASS INDEX (BMI) OF 40.0 TO 44.9 IN ADULT (HCC): Status: ACTIVE | Noted: 2020-05-19

## 2020-05-19 PROBLEM — E66.813 CLASS 3 SEVERE OBESITY DUE TO EXCESS CALORIES WITHOUT SERIOUS COMORBIDITY WITH BODY MASS INDEX (BMI) OF 40.0 TO 44.9 IN ADULT: Status: ACTIVE | Noted: 2020-05-19

## 2020-05-19 PROCEDURE — 99214 OFFICE O/P EST MOD 30 MIN: CPT | Performed by: FAMILY MEDICINE

## 2020-05-19 RX ORDER — POTASSIUM CHLORIDE 20 MEQ/1
TABLET, EXTENDED RELEASE ORAL
Qty: 90 TABLET | Refills: 1 | Status: SHIPPED | OUTPATIENT
Start: 2020-05-19 | End: 2020-11-10 | Stop reason: SDUPTHER

## 2020-05-19 RX ORDER — HYDROCHLOROTHIAZIDE 25 MG/1
25 TABLET ORAL DAILY
Qty: 90 TABLET | Refills: 1 | Status: SHIPPED | OUTPATIENT
Start: 2020-05-19 | End: 2020-11-10 | Stop reason: SDUPTHER

## 2020-05-19 RX ORDER — AMLODIPINE BESYLATE 5 MG/1
TABLET ORAL
Qty: 90 TABLET | Refills: 1 | Status: SHIPPED | OUTPATIENT
Start: 2020-05-19 | End: 2020-11-10 | Stop reason: SDUPTHER

## 2020-05-19 RX ORDER — LOSARTAN POTASSIUM 100 MG/1
100 TABLET ORAL DAILY
Qty: 90 TABLET | Refills: 1 | Status: SHIPPED | OUTPATIENT
Start: 2020-05-19 | End: 2020-11-10 | Stop reason: SDUPTHER

## 2020-05-19 ASSESSMENT — ENCOUNTER SYMPTOMS
CHEST TIGHTNESS: 0
SHORTNESS OF BREATH: 0
ABDOMINAL PAIN: 0
VOMITING: 0
BLOOD IN STOOL: 0
EYE PAIN: 0
NAUSEA: 0
TROUBLE SWALLOWING: 0
WHEEZING: 0
DIARRHEA: 0

## 2020-05-19 ASSESSMENT — PATIENT HEALTH QUESTIONNAIRE - PHQ9
SUM OF ALL RESPONSES TO PHQ QUESTIONS 1-9: 0
SUM OF ALL RESPONSES TO PHQ9 QUESTIONS 1 & 2: 0
SUM OF ALL RESPONSES TO PHQ QUESTIONS 1-9: 0
2. FEELING DOWN, DEPRESSED OR HOPELESS: 0
1. LITTLE INTEREST OR PLEASURE IN DOING THINGS: 0

## 2020-05-19 NOTE — PROGRESS NOTES
 hydrochlorothiazide (HYDRODIURIL) 25 MG tablet Take 1 tablet by mouth daily 90 tablet 1    amLODIPine (NORVASC) 5 MG tablet TAKE 1 TABLET BY MOUTH EVERY DAY 90 tablet 1    potassium chloride (KLOR-CON M20) 20 MEQ extended release tablet TAKE 1 TABLET BY MOUTH EVERY DAY 90 tablet 1    azelastine (OPTIVAR) 0.05 % ophthalmic solution Place 1 drop into both eyes 2 times daily 1 Bottle 5    fluticasone (FLONASE ALLERGY RELIEF) 50 MCG/ACT nasal spray 2 sprays by Each Nostril route daily      Omega-3 1000 MG CAPS Take 1 capsule by mouth daily      Turmeric 500 MG TABS Take 1 tablet by mouth daily      Cholecalciferol (VITAMIN D-3) 1000 units CAPS Take 2 capsules by mouth daily       colchicine (COLCRYS) 0.6 MG tablet 1 tablet 2 to 3 times daily as needed for gout flare (Patient not taking: Reported on 5/19/2020) 25 tablet 1     No current facility-administered medications for this visit. ALLERGIES  No Known Allergies    PHYSICAL EXAM    /72 (Site: Right Upper Arm, Position: Sitting, Cuff Size: Large Adult)   Pulse 66   Temp 97.3 °F (36.3 °C)   Ht 6' 2\" (1.88 m)   Wt (!) 324 lb (147 kg)   SpO2 93%   BMI 41.60 kg/m²     Physical Exam  Vitals signs and nursing note reviewed. Constitutional:       Appearance: He is well-developed. HENT:      Head: Normocephalic and atraumatic. Nose: Congestion present. Mouth/Throat:      Mouth: Mucous membranes are moist.      Pharynx: Oropharynx is clear. Eyes:      Pupils: Pupils are equal, round, and reactive to light. Neck:      Musculoskeletal: Normal range of motion and neck supple. Cardiovascular:      Rate and Rhythm: Normal rate. Rhythm irregular. Heart sounds: Normal heart sounds. Pulmonary:      Effort: Pulmonary effort is normal. No respiratory distress. Breath sounds: Normal breath sounds. No wheezing or rhonchi. Abdominal:      Palpations: Abdomen is soft. Musculoskeletal: Normal range of motion.       Right lower

## 2020-06-16 ENCOUNTER — ANTI-COAG VISIT (OUTPATIENT)
Dept: FAMILY MEDICINE CLINIC | Age: 70
End: 2020-06-16

## 2020-06-16 ENCOUNTER — NURSE ONLY (OUTPATIENT)
Dept: FAMILY MEDICINE CLINIC | Age: 70
End: 2020-06-16
Payer: MEDICARE

## 2020-06-16 LAB
INTERNATIONAL NORMALIZATION RATIO, POC: 2.7
PROTHROMBIN TIME, POC: NORMAL

## 2020-06-16 PROCEDURE — 85610 PROTHROMBIN TIME: CPT | Performed by: FAMILY MEDICINE

## 2020-07-14 ENCOUNTER — NURSE ONLY (OUTPATIENT)
Dept: FAMILY MEDICINE CLINIC | Age: 70
End: 2020-07-14
Payer: MEDICARE

## 2020-07-14 LAB
INTERNATIONAL NORMALIZATION RATIO, POC: 3
PROTHROMBIN TIME, POC: NORMAL

## 2020-07-14 PROCEDURE — 85610 PROTHROMBIN TIME: CPT | Performed by: FAMILY MEDICINE

## 2020-08-11 ENCOUNTER — ANTI-COAG VISIT (OUTPATIENT)
Dept: FAMILY MEDICINE CLINIC | Age: 70
End: 2020-08-11

## 2020-08-11 ENCOUNTER — NURSE ONLY (OUTPATIENT)
Dept: FAMILY MEDICINE CLINIC | Age: 70
End: 2020-08-11
Payer: MEDICARE

## 2020-08-11 LAB
INTERNATIONAL NORMALIZATION RATIO, POC: 3.2
PROTHROMBIN TIME, POC: ABNORMAL

## 2020-08-11 PROCEDURE — 99999 PR OFFICE/OUTPT VISIT,PROCEDURE ONLY: CPT | Performed by: FAMILY MEDICINE

## 2020-08-11 PROCEDURE — 85610 PROTHROMBIN TIME: CPT | Performed by: FAMILY MEDICINE

## 2020-09-10 ENCOUNTER — NURSE ONLY (OUTPATIENT)
Dept: FAMILY MEDICINE CLINIC | Age: 70
End: 2020-09-10
Payer: MEDICARE

## 2020-09-10 LAB
INTERNATIONAL NORMALIZATION RATIO, POC: 2.3
PROTHROMBIN TIME, POC: NORMAL

## 2020-09-10 PROCEDURE — 85610 PROTHROMBIN TIME: CPT | Performed by: FAMILY MEDICINE

## 2020-09-29 RX ORDER — WARFARIN SODIUM 5 MG/1
TABLET ORAL
Qty: 45 TABLET | Refills: 1 | Status: SHIPPED | OUTPATIENT
Start: 2020-09-29 | End: 2020-10-13 | Stop reason: SDUPTHER

## 2020-10-13 ENCOUNTER — ANTI-COAG VISIT (OUTPATIENT)
Dept: FAMILY MEDICINE CLINIC | Age: 70
End: 2020-10-13
Payer: MEDICARE

## 2020-10-13 ENCOUNTER — NURSE ONLY (OUTPATIENT)
Dept: FAMILY MEDICINE CLINIC | Age: 70
End: 2020-10-13
Payer: MEDICARE

## 2020-10-13 LAB
INTERNATIONAL NORMALIZATION RATIO, POC: 2.1
PROTHROMBIN TIME, POC: NORMAL

## 2020-10-13 PROCEDURE — 85610 PROTHROMBIN TIME: CPT | Performed by: FAMILY MEDICINE

## 2020-10-13 PROCEDURE — 93793 ANTICOAG MGMT PT WARFARIN: CPT | Performed by: FAMILY MEDICINE

## 2020-10-13 RX ORDER — WARFARIN SODIUM 5 MG/1
TABLET ORAL
Qty: 45 TABLET | Refills: 1 | Status: SHIPPED | OUTPATIENT
Start: 2020-10-13 | End: 2021-03-18 | Stop reason: DRUGHIGH

## 2020-10-13 NOTE — TELEPHONE ENCOUNTER
Requested Prescriptions     Signed Prescriptions Disp Refills    warfarin (COUMADIN) 5 MG tablet 45 tablet 1     Sig: TAKE 1/2 TABLET BY MOUTH DAILY     Authorizing Provider: Mona Castellano     Ordering User: Gurpreet Haile

## 2020-11-10 ENCOUNTER — OFFICE VISIT (OUTPATIENT)
Dept: FAMILY MEDICINE CLINIC | Age: 70
End: 2020-11-10
Payer: MEDICARE

## 2020-11-10 VITALS
WEIGHT: 315 LBS | BODY MASS INDEX: 40.43 KG/M2 | SYSTOLIC BLOOD PRESSURE: 118 MMHG | DIASTOLIC BLOOD PRESSURE: 74 MMHG | HEART RATE: 51 BPM | TEMPERATURE: 96.1 F | OXYGEN SATURATION: 97 % | HEIGHT: 74 IN

## 2020-11-10 DIAGNOSIS — Z13.220 LIPID SCREENING: ICD-10-CM

## 2020-11-10 DIAGNOSIS — I10 ESSENTIAL HYPERTENSION: ICD-10-CM

## 2020-11-10 LAB
A/G RATIO: 1.4 (ref 1.1–2.2)
ALBUMIN SERPL-MCNC: 4.1 G/DL (ref 3.4–5)
ALP BLD-CCNC: 49 U/L (ref 40–129)
ALT SERPL-CCNC: 24 U/L (ref 10–40)
ANION GAP SERPL CALCULATED.3IONS-SCNC: 10 MMOL/L (ref 3–16)
AST SERPL-CCNC: 30 U/L (ref 15–37)
BILIRUB SERPL-MCNC: 1.2 MG/DL (ref 0–1)
BUN BLDV-MCNC: 16 MG/DL (ref 7–20)
CALCIUM SERPL-MCNC: 9.3 MG/DL (ref 8.3–10.6)
CHLORIDE BLD-SCNC: 101 MMOL/L (ref 99–110)
CHOLESTEROL, TOTAL: 164 MG/DL (ref 0–199)
CO2: 28 MMOL/L (ref 21–32)
CREAT SERPL-MCNC: 0.7 MG/DL (ref 0.8–1.3)
GFR AFRICAN AMERICAN: >60
GFR NON-AFRICAN AMERICAN: >60
GLOBULIN: 2.9 G/DL
GLUCOSE BLD-MCNC: 107 MG/DL (ref 70–99)
HCT VFR BLD CALC: 49.5 % (ref 40.5–52.5)
HDLC SERPL-MCNC: 43 MG/DL (ref 40–60)
HEMOGLOBIN: 16.7 G/DL (ref 13.5–17.5)
INTERNATIONAL NORMALIZATION RATIO, POC: 2.1
LDL CHOLESTEROL CALCULATED: 101 MG/DL
MCH RBC QN AUTO: 31.9 PG (ref 26–34)
MCHC RBC AUTO-ENTMCNC: 33.6 G/DL (ref 31–36)
MCV RBC AUTO: 94.8 FL (ref 80–100)
PDW BLD-RTO: 13.3 % (ref 12.4–15.4)
PLATELET # BLD: 243 K/UL (ref 135–450)
PMV BLD AUTO: 8.8 FL (ref 5–10.5)
POTASSIUM SERPL-SCNC: 4.1 MMOL/L (ref 3.5–5.1)
PROTHROMBIN TIME, POC: NORMAL
RBC # BLD: 5.23 M/UL (ref 4.2–5.9)
SODIUM BLD-SCNC: 139 MMOL/L (ref 136–145)
TOTAL PROTEIN: 7 G/DL (ref 6.4–8.2)
TRIGL SERPL-MCNC: 101 MG/DL (ref 0–150)
VLDLC SERPL CALC-MCNC: 20 MG/DL
WBC # BLD: 7.8 K/UL (ref 4–11)

## 2020-11-10 PROCEDURE — 99214 OFFICE O/P EST MOD 30 MIN: CPT | Performed by: FAMILY MEDICINE

## 2020-11-10 PROCEDURE — 85610 PROTHROMBIN TIME: CPT | Performed by: FAMILY MEDICINE

## 2020-11-10 RX ORDER — POTASSIUM CHLORIDE 20 MEQ/1
TABLET, EXTENDED RELEASE ORAL
Qty: 90 TABLET | Refills: 1 | Status: SHIPPED | OUTPATIENT
Start: 2020-11-10 | End: 2021-05-12 | Stop reason: SDUPTHER

## 2020-11-10 RX ORDER — SILDENAFIL 100 MG/1
100 TABLET, FILM COATED ORAL DAILY PRN
Qty: 30 TABLET | Refills: 1 | Status: SHIPPED | OUTPATIENT
Start: 2020-11-10

## 2020-11-10 RX ORDER — LOSARTAN POTASSIUM 100 MG/1
100 TABLET ORAL DAILY
Qty: 90 TABLET | Refills: 1 | Status: SHIPPED | OUTPATIENT
Start: 2020-11-10 | End: 2021-05-12 | Stop reason: SDUPTHER

## 2020-11-10 RX ORDER — HYDROCHLOROTHIAZIDE 25 MG/1
25 TABLET ORAL DAILY
Qty: 90 TABLET | Refills: 1 | Status: SHIPPED | OUTPATIENT
Start: 2020-11-10 | End: 2021-05-12 | Stop reason: SDUPTHER

## 2020-11-10 RX ORDER — SILDENAFIL 100 MG/1
100 TABLET, FILM COATED ORAL DAILY PRN
Qty: 30 TABLET | Refills: 1 | Status: SHIPPED | OUTPATIENT
Start: 2020-11-10 | End: 2020-11-10

## 2020-11-10 RX ORDER — AMLODIPINE BESYLATE 5 MG/1
TABLET ORAL
Qty: 90 TABLET | Refills: 1 | Status: SHIPPED | OUTPATIENT
Start: 2020-11-10 | End: 2021-05-12 | Stop reason: SDUPTHER

## 2020-11-10 ASSESSMENT — ENCOUNTER SYMPTOMS
NAUSEA: 0
VOMITING: 0
BLOOD IN STOOL: 0
TROUBLE SWALLOWING: 0
EYE PAIN: 0
DIARRHEA: 0
SHORTNESS OF BREATH: 0
WHEEZING: 0
CHEST TIGHTNESS: 0
APNEA: 1
ABDOMINAL PAIN: 0

## 2020-11-10 NOTE — PROGRESS NOTES
11/10/2020    [facility]    Chief Complaint   Patient presents with    6 Month Follow-Up       HPI  History was obtained from the patient. Kathy Iyer is a 78 y.o. male who presents today with routine follow-up on hypertension, atrial fib, sleep apnea, increased weight, and gout. Overall has been feeling pretty well he remains active. INR today was 2.1 he remains on anticoagulation with Coumadin for chronic atrial fib. Allergies are stable request sildenafil for mild impotence issue. Has not had recent gout issue. Does wear his CPAP faithfully for sleep apnea. Meds are overall well-tolerated. On review he is to get a Pneumovax 23 he has had his flu shot. Refills are needed and he will need some follow-up labs. REVIEW OF SYMPTOMS    Review of Systems   Constitutional: Negative for activity change and fatigue. HENT: Negative for congestion, hearing loss, mouth sores and trouble swallowing. Eyes: Negative for pain and visual disturbance. Respiratory: Positive for apnea. Negative for chest tightness, shortness of breath and wheezing. Cardiovascular: Positive for palpitations. Negative for chest pain. Gastrointestinal: Negative for abdominal pain, blood in stool, diarrhea, nausea and vomiting. Endocrine: Negative for polydipsia and polyuria. Genitourinary: Negative for dysuria, frequency and urgency. Musculoskeletal: Positive for arthralgias. Negative for gait problem and neck stiffness. Skin: Negative for rash. Allergic/Immunologic: Negative for environmental allergies. Neurological: Negative for dizziness, seizures, speech difficulty and weakness. Hematological: Does not bruise/bleed easily. Psychiatric/Behavioral: Negative for agitation, confusion, dysphoric mood and hallucinations. The patient is not nervous/anxious.         PAST MEDICAL HISTORY  Past Medical History:   Diagnosis Date    Allergic rhinitis 5/24/2019    [facility] [address]     [facility]     skin    Cardiomyopathy ([facility] [address] 5/24/2019    Hilar lymphadenopathy 5/24/2019    calcified    Hyperlipidemia 5/24/2019    Hypertension     Impotence 5/24/2019    Obstructive sleep apnea syndrome 5/24/2019    Peripheral venous insufficiency 5/24/2019    Sleep apnea        FAMILY HISTORY  No family history on file.     SOCIAL HISTORY  Social History     Socioeconomic History    Marital status:      Spouse name: Not on file    Number of children: Not on file    Years of education: Not on file    Highest education level: Not on file   Occupational History    Not on file   Social Needs    Financial resource strain: Not on file    Food insecurity     Worry: Not on file     Inability: Not on file    Transportation needs     Medical: Not on file     Non-medical: Not on file   Tobacco Use    Smoking status: Never Smoker    Smokeless tobacco: Never Used   Substance and Sexual Activity    Alcohol use: No    Drug use: No    Sexual activity: Not on file   Lifestyle    Physical activity     Days per week: Not on file     Minutes per session: Not on file    Stress: Not on file   Relationships    Social connections     Talks on phone: Not on file     Gets together: Not on file     Attends [employer] service: Not on file     Active member of club or organization: Not on file     Attends meetings of clubs or organizations: Not on file     Relationship status: Not on file    Intimate partner violence     Fear of current or ex partner: Not on file     Emotionally abused: Not on file     Physically abused: Not on file     Forced sexual activity: Not on file   Other Topics Concern    Not on file   Social History Narrative    Not on file        SURGICAL HISTORY  Past Surgical History:   Procedure Laterality Date    APPENDECTOMY      CARDIOVERSION      KNEE ARTHROSCOPY      right knee                 CURRENT MEDICATIONS  Current Outpatient Medications   Medication Sig Dispense Refill    amLODIPine (NORVASC) 5 MG tablet TAKE 1 TABLET BY MOUTH EVERY DAY 90 tablet 1    hydroCHLOROthiazide (HYDRODIURIL) 25 MG tablet Take 1 tablet by mouth daily 90 tablet 1    losartan (COZAAR) 100 MG tablet Take 1 tablet by mouth daily 90 tablet 1    potassium chloride (KLOR-CON M20) 20 MEQ extended release tablet TAKE 1 TABLET BY MOUTH EVERY DAY 90 tablet 1    sildenafil (VIAGRA) 100 MG tablet Take 1 tablet by mouth daily as needed for Erectile Dysfunction 30 tablet 1    warfarin (COUMADIN) 5 MG tablet TAKE 1/2 TABLET BY MOUTH DAILY 45 tablet 1    colchicine (COLCRYS) 0.6 MG tablet 1 tablet 2 to 3 times daily as needed for gout flare 25 tablet 1    azelastine (OPTIVAR) 0.05 % ophthalmic solution Place 1 drop into both eyes 2 times daily 1 Bottle 5    fluticasone (FLONASE ALLERGY RELIEF) 50 MCG/ACT nasal spray 2 sprays by Each Nostril route daily      Omega-3 1000 MG CAPS Take 1 capsule by mouth daily      Turmeric 500 MG TABS Take 1 tablet by mouth daily      Cholecalciferol (VITAMIN D-3) 1000 units CAPS Take 2 capsules by mouth daily        No current facility-administered medications for this visit. ALLERGIES  No Known Allergies    PHYSICAL EXAM    /74 (Site: Right Upper Arm, Position: Sitting, Cuff Size: Medium Adult)   Pulse 51   Temp 96.1 °F (35.6 °C)   Ht 6' 2\" (1.88 m)   Wt (!) 342 lb 12.8 oz (155.5 kg)   SpO2 97%   BMI 44.01 kg/m²     Physical Exam  Vitals signs and nursing note reviewed. Constitutional:       Appearance: He is well-developed. He is obese. He is not ill-appearing. HENT:      Head: Normocephalic and atraumatic. Nose: Nose normal. No congestion. Mouth/Throat:      Mouth: Mucous membranes are moist.      Pharynx: Oropharynx is clear. No posterior oropharyngeal erythema. Eyes:      Pupils: Pupils are equal, round, and reactive to light. Neck:      Musculoskeletal: Normal range of motion and neck supple. Cardiovascular:      Rate and Rhythm: Normal rate. Rhythm irregular. Heart sounds: Normal heart sounds. Pulmonary:      Effort: Pulmonary effort is normal.      Breath sounds: Normal breath sounds. Abdominal:      Palpations: Abdomen is soft. Musculoskeletal: Normal range of motion. Comments: Patient does have varicose veins visible. Not inflamed   Skin:     General: Skin is warm and dry. Neurological:      General: No focal deficit present. Mental Status: He is alert and oriented to person, place, and time. Mental status is at baseline. Cranial Nerves: No cranial nerve deficit. Motor: No weakness. Gait: Gait normal.   Psychiatric:         Behavior: Behavior normal.         Thought Content: Thought content normal.         ASSESSMENT & PLAN     Diagnosis Orders   1. Essential hypertension  amLODIPine (NORVASC) 5 MG tablet    potassium chloride (KLOR-CON M20) 20 MEQ extended release tablet    CBC    COMPREHENSIVE METABOLIC PANEL   2. Atrial fibrillation, unspecified type ([address]  POCT INR   3. Obstructive sleep apnea syndrome     4. Idiopathic chronic gout of multiple sites without tophus     5. Lipid screening  LIPID PANEL   6. Impotence     7. Class 3 severe obesity due to excess calories without serious comorbidity with body mass index (BMI) of 40.0 to 44.9 in adult [facility])     Refills were given. Questions were answered, and patient advised to get Pneumovax 23 at pharmacy of choice. Check CBC, CMP, lipid panel, and INR as above. Continue to work on healthy lifestyle with exercise and weight loss. Follow-up for testing results    Return in about 6 months (around 5/10/2021).          Electronically signed by Oscar Courtney MD on 11/10/2020

## 2020-12-10 ENCOUNTER — NURSE ONLY (OUTPATIENT)
Dept: FAMILY MEDICINE CLINIC | Age: 70
End: 2020-12-10
Payer: MEDICARE

## 2020-12-10 LAB
INTERNATIONAL NORMALIZATION RATIO, POC: 2.1
PROTHROMBIN TIME, POC: NORMAL

## 2020-12-10 PROCEDURE — 85610 PROTHROMBIN TIME: CPT | Performed by: FAMILY MEDICINE

## 2021-01-07 ENCOUNTER — NURSE ONLY (OUTPATIENT)
Dept: FAMILY MEDICINE CLINIC | Age: 71
End: 2021-01-07
Payer: MEDICARE

## 2021-01-07 DIAGNOSIS — I48.91 ATRIAL FIBRILLATION, UNSPECIFIED TYPE (HCC): ICD-10-CM

## 2021-01-07 LAB — INTERNATIONAL NORMALIZATION RATIO, POC: 1.6

## 2021-01-07 PROCEDURE — 93793 ANTICOAG MGMT PT WARFARIN: CPT | Performed by: FAMILY MEDICINE

## 2021-01-07 PROCEDURE — 85610 PROTHROMBIN TIME: CPT | Performed by: FAMILY MEDICINE

## 2021-01-07 RX ORDER — WARFARIN SODIUM 2.5 MG/1
2.5 TABLET ORAL DAILY
Qty: 90 TABLET | Refills: 0 | Status: SHIPPED | OUTPATIENT
Start: 2021-01-07 | End: 2021-04-15 | Stop reason: SDUPTHER

## 2021-01-21 ENCOUNTER — NURSE ONLY (OUTPATIENT)
Dept: FAMILY MEDICINE CLINIC | Age: 71
End: 2021-01-21
Payer: MEDICARE

## 2021-01-21 DIAGNOSIS — I48.91 ATRIAL FIBRILLATION, UNSPECIFIED TYPE (HCC): ICD-10-CM

## 2021-01-21 LAB — INTERNATIONAL NORMALIZATION RATIO, POC: 2.5

## 2021-01-21 PROCEDURE — 93793 ANTICOAG MGMT PT WARFARIN: CPT | Performed by: FAMILY MEDICINE

## 2021-01-21 PROCEDURE — 85610 PROTHROMBIN TIME: CPT | Performed by: FAMILY MEDICINE

## 2021-02-18 ENCOUNTER — NURSE ONLY (OUTPATIENT)
Dept: FAMILY MEDICINE CLINIC | Age: 71
End: 2021-02-18
Payer: MEDICARE

## 2021-02-18 DIAGNOSIS — I48.91 ATRIAL FIBRILLATION, UNSPECIFIED TYPE (HCC): ICD-10-CM

## 2021-02-18 LAB — INTERNATIONAL NORMALIZATION RATIO, POC: 3.1

## 2021-02-18 PROCEDURE — 85610 PROTHROMBIN TIME: CPT | Performed by: FAMILY MEDICINE

## 2021-02-18 NOTE — PROGRESS NOTES
Confirmed current coumadin 2.5mg daily. INR 3.1. Change to 2.5mg x6 days and nothing on day 7.   Recheck in 3 weeks

## 2021-03-11 ENCOUNTER — ANTI-COAG VISIT (OUTPATIENT)
Dept: FAMILY MEDICINE CLINIC | Age: 71
End: 2021-03-11

## 2021-03-18 ENCOUNTER — APPOINTMENT (OUTPATIENT)
Dept: GENERAL RADIOLOGY | Age: 71
End: 2021-03-18
Payer: MEDICARE

## 2021-03-18 ENCOUNTER — HOSPITAL ENCOUNTER (EMERGENCY)
Age: 71
Discharge: HOME | End: 2021-03-18
Attending: EMERGENCY MEDICINE
Payer: MEDICARE

## 2021-03-18 ENCOUNTER — APPOINTMENT (OUTPATIENT)
Dept: CT IMAGING | Age: 71
End: 2021-03-18
Payer: MEDICARE

## 2021-03-18 VITALS
BODY MASS INDEX: 39.17 KG/M2 | TEMPERATURE: 98 F | SYSTOLIC BLOOD PRESSURE: 132 MMHG | DIASTOLIC BLOOD PRESSURE: 78 MMHG | OXYGEN SATURATION: 100 % | HEART RATE: 64 BPM | WEIGHT: 315 LBS | HEIGHT: 75 IN | RESPIRATION RATE: 18 BRPM

## 2021-03-18 DIAGNOSIS — R42 DIZZINESS: Primary | ICD-10-CM

## 2021-03-18 DIAGNOSIS — I48.21 PERMANENT ATRIAL FIBRILLATION (HCC): ICD-10-CM

## 2021-03-18 LAB
ALBUMIN SERPL-MCNC: 3.8 GM/DL (ref 3.4–5)
ALP BLD-CCNC: 46 IU/L (ref 40–129)
ALT SERPL-CCNC: 22 U/L (ref 10–40)
ANION GAP SERPL CALCULATED.3IONS-SCNC: 10 MMOL/L (ref 4–16)
AST SERPL-CCNC: 29 IU/L (ref 15–37)
BASOPHILS ABSOLUTE: 0.1 K/CU MM
BASOPHILS RELATIVE PERCENT: 0.6 % (ref 0–1)
BILIRUB SERPL-MCNC: 1 MG/DL (ref 0–1)
BUN BLDV-MCNC: 13 MG/DL (ref 6–23)
CALCIUM SERPL-MCNC: 8.9 MG/DL (ref 8.3–10.6)
CHLORIDE BLD-SCNC: 101 MMOL/L (ref 99–110)
CO2: 26 MMOL/L (ref 21–32)
CREAT SERPL-MCNC: 0.8 MG/DL (ref 0.9–1.3)
DIFFERENTIAL TYPE: ABNORMAL
EOSINOPHILS ABSOLUTE: 0.1 K/CU MM
EOSINOPHILS RELATIVE PERCENT: 1.1 % (ref 0–3)
GFR AFRICAN AMERICAN: >60 ML/MIN/1.73M2
GFR NON-AFRICAN AMERICAN: >60 ML/MIN/1.73M2
GLUCOSE BLD-MCNC: 113 MG/DL (ref 70–99)
HCT VFR BLD CALC: 47.8 % (ref 42–52)
HEMOGLOBIN: 16.4 GM/DL (ref 13.5–18)
IMMATURE NEUTROPHIL %: 0.6 % (ref 0–0.43)
INR BLD: 2.16 INDEX
LYMPHOCYTES ABSOLUTE: 2.6 K/CU MM
LYMPHOCYTES RELATIVE PERCENT: 31.9 % (ref 24–44)
MCH RBC QN AUTO: 32 PG (ref 27–31)
MCHC RBC AUTO-ENTMCNC: 34.3 % (ref 32–36)
MCV RBC AUTO: 93.2 FL (ref 78–100)
MONOCYTES ABSOLUTE: 0.7 K/CU MM
MONOCYTES RELATIVE PERCENT: 9.2 % (ref 0–4)
NUCLEATED RBC %: 0 %
PDW BLD-RTO: 12.6 % (ref 11.7–14.9)
PLATELET # BLD: 231 K/CU MM (ref 140–440)
PMV BLD AUTO: 9.5 FL (ref 7.5–11.1)
POTASSIUM SERPL-SCNC: 3.9 MMOL/L (ref 3.5–5.1)
PROTHROMBIN TIME: 26.3 SECONDS (ref 11.7–14.5)
RBC # BLD: 5.13 M/CU MM (ref 4.6–6.2)
SEGMENTED NEUTROPHILS ABSOLUTE COUNT: 4.6 K/CU MM
SEGMENTED NEUTROPHILS RELATIVE PERCENT: 56.6 % (ref 36–66)
SODIUM BLD-SCNC: 137 MMOL/L (ref 135–145)
TOTAL IMMATURE NEUTOROPHIL: 0.05 K/CU MM
TOTAL NUCLEATED RBC: 0 K/CU MM
TOTAL PROTEIN: 6.5 GM/DL (ref 6.4–8.2)
TROPONIN T: <0.01 NG/ML
WBC # BLD: 8.1 K/CU MM (ref 4–10.5)

## 2021-03-18 PROCEDURE — 93005 ELECTROCARDIOGRAM TRACING: CPT | Performed by: EMERGENCY MEDICINE

## 2021-03-18 PROCEDURE — 99283 EMERGENCY DEPT VISIT LOW MDM: CPT

## 2021-03-18 PROCEDURE — 93010 ELECTROCARDIOGRAM REPORT: CPT | Performed by: INTERNAL MEDICINE

## 2021-03-18 PROCEDURE — 84484 ASSAY OF TROPONIN QUANT: CPT

## 2021-03-18 PROCEDURE — 80053 COMPREHEN METABOLIC PANEL: CPT

## 2021-03-18 PROCEDURE — 70496 CT ANGIOGRAPHY HEAD: CPT

## 2021-03-18 PROCEDURE — 70450 CT HEAD/BRAIN W/O DYE: CPT

## 2021-03-18 PROCEDURE — 85025 COMPLETE CBC W/AUTO DIFF WBC: CPT

## 2021-03-18 PROCEDURE — 85610 PROTHROMBIN TIME: CPT

## 2021-03-18 PROCEDURE — 71045 X-RAY EXAM CHEST 1 VIEW: CPT

## 2021-03-18 PROCEDURE — 6360000004 HC RX CONTRAST MEDICATION: Performed by: EMERGENCY MEDICINE

## 2021-03-18 RX ADMIN — IOPAMIDOL 80 ML: 755 INJECTION, SOLUTION INTRAVENOUS at 11:28

## 2021-03-18 NOTE — ED PROVIDER NOTES
eMERGENCY dEPARTMENT eNCOUnter      PCP: June Vasquez MD    [address] [facility]    Chief Complaint   Patient presents with    Irregular Heart Beat     a-fib and palpitations     Dizziness     on and off since sunday        HPI    Lashonda Mijares is a 78 y.o. male who presents with A. fib, dizziness. States symptoms have been present since Sunday. Reports he does have history of A. fib, states is been in chronic A. fib since 2007. Reports that his wife listened to his heart and felt it was more irregular than normal.  States he has been feeling dizzy, described as feeling wobbling and fogginess in his head. States it was primarily when he bent over and stood back up he felt wobbling, back and forth. He reports symptoms have been intermittent since onset. He denies vertigo, spinning sensation, lightheadedness, near-syncope, syncope. Denies chest pain, shortness of breath or palpitations. States that he always has some ankle edema, no worse than normal.  No recent illness. Denies diplopia, blurry vision. Denies weakness, numbness or tingling of extremities. Denies any recent falls, MVC, accident, chiropractic manipulation. REVIEW OF SYSTEMS    General: No fevers or chills  Cardiac: No chest pain or palpitations  Respiratory: No shortness of breath or new cough  Eyes: No vision change. ENT: No sore throat or choking. : No dysuria or hematuria  GI: No abdominal pain, vomiting or diarrhea  Extremities: No extremity pain or swelling  Neurologic: See HPI  Skin: No rash or color change  See HPI for further details.     All other review of systems are negative  See HPI and nursing notes for additional information    PAST MEDICAL OR SURGICAL HISTORY    Past Medical History:   Diagnosis Date    Allergic rhinitis 5/24/2019    Atrial fibrillation ([address]     Cancer ([address]     skin    Cardiomyopathy ([address] 5/24/2019    Hilar lymphadenopathy 5/24/2019    calcified    Hyperlipidemia 5/24/2019    Hypertension     Impotence 5/24/2019    Obstructive sleep apnea syndrome 5/24/2019    Peripheral venous insufficiency 5/24/2019    Sleep apnea        CURRENT MEDICATIONS    Current Outpatient Rx   Medication Sig Dispense Refill    warfarin (COUMADIN) 2.5 MG tablet Take 1 tablet by mouth daily Per INR 90 tablet 0    amLODIPine (NORVASC) 5 MG tablet TAKE 1 TABLET BY MOUTH EVERY DAY 90 tablet 1    hydroCHLOROthiazide (HYDRODIURIL) 25 MG tablet Take 1 tablet by mouth daily 90 tablet 1    losartan (COZAAR) 100 MG tablet Take 1 tablet by mouth daily 90 tablet 1    potassium chloride (KLOR-CON M20) 20 MEQ extended release tablet TAKE 1 TABLET BY MOUTH EVERY DAY 90 tablet 1    sildenafil (VIAGRA) 100 MG tablet Take 1 tablet by mouth daily as needed for Erectile Dysfunction 30 tablet 1    warfarin (COUMADIN) 5 MG tablet TAKE 1/2 TABLET BY MOUTH DAILY 45 tablet 1    colchicine (COLCRYS) 0.6 MG tablet 1 tablet 2 to 3 times daily as needed for gout flare 25 tablet 1    azelastine (OPTIVAR) 0.05 % ophthalmic solution Place 1 drop into both eyes 2 times daily 1 Bottle 5    fluticasone (FLONASE ALLERGY RELIEF) 50 MCG/ACT nasal spray 2 sprays by Each Nostril route daily      Omega-3 1000 MG CAPS Take 1 capsule by mouth daily      Turmeric 500 MG TABS Take 1 tablet by mouth daily      Cholecalciferol (VITAMIN D-3) 1000 units CAPS Take 2 capsules by mouth daily          ALLERGIES    No Known Allergies    FAMILY OR SOCIAL HISTORY    History reviewed. No pertinent family history.     PHYSICAL EXAM    VITAL SIGNS: BP (!) 152/99   Pulse 64   Temp 98 °F (36.7 °C) (Oral)   Resp 17   Ht 6' 2.5\" (1.892 m)   Wt (!) 355 lb (161 kg)   SpO2 100%   BMI 44.97 kg/m²   Constitutional:  Awake, alert, no acute distress  Eyes:  Pupils equally round and reactive to light, EOM intact  HENT:  External ears normal, nose normal, oropharynx moist, normal posterior pharynx  Neck: supple without ridigity  Respiratory:  No respiratory distress, CTAB   Cardiovascular: Irregularly irregular  GI:  Soft, nontender, nondistended, normal bowel sounds  Musculoskeletal:  No edema, normal ROM   Integument:  Skin is warm and dry, no obvious rash    Neurologic: Alert & oriented. Vision is intact. Pupils are equal, round and reactive to light bilaterally, EOM are intact, face is symmetrical, tongue is midline. Motor function and sensation are grossly intact. There is no pronator drift. Finger to nose is normal.  Speech is normal. Ambulated with steady gait. NIH 0. LABS:  Labs Reviewed   CBC WITH AUTO DIFFERENTIAL   COMPREHENSIVE METABOLIC PANEL   TROPONIN   PROTIME-INR       EKG    This EKG was interpreted by me. Rate is 68, rhythm is a fib. QT intervals are within normal limits. There is no ST segment or T wave changes. This EKG was compared to previous EKG from date 2/26/19. No significant change from previous EKG. RADIOLOGY/PROCEDURES    Ct Head Wo Contrast    Result Date: 3/18/2021  EXAMINATION: CT OF THE HEAD WITHOUT CONTRAST  3/18/2021 11:03 am TECHNIQUE: CT of the head was performed without the administration of intravenous contrast. Dose modulation, iterative reconstruction, and/or weight based adjustment of the mA/kV was utilized to reduce the radiation dose to as low as reasonably achievable. COMPARISON: 02/26/2019 HISTORY: ORDERING SYSTEM PROVIDED HISTORY: off [facility] TECHNOLOGIST PROVIDED HISTORY: Has a \"code stroke\" or \"stroke alert\" been called? ->No Reason for exam:->off [facility] Decision Support Exception->Emergency Medical Condition (MA) Reason for Exam: off balance Acuity: Acute Type of Exam: Initial FINDINGS: BRAIN/VENTRICLES: There is no acute intracranial hemorrhage, mass effect or midline shift. No abnormal extra-axial fluid collection. The gray-white differentiation is maintained without evidence of an acute infarct. There is no evidence of hydrocephalus. No wedge-shaped area of acute ischemia is identified.   No intracranial mass is identified. No basilar cistern or sulcal effacement. Minimal scattered white matter low attenuation suggestive of chronic microvascular ischemic change. ORBITS: The visualized portion of the orbits demonstrate no acute abnormality. SINUSES: The visualized paranasal sinuses and mastoid air cells demonstrate no acute abnormality. SOFT TISSUES/SKULL:  No acute abnormality of the visualized skull or soft tissues. Stable appearing CT scan of the head without acute intracranial abnormality. Xr Chest Portable    Result Date: 3/18/2021  EXAMINATION: ONE XRAY VIEW OF THE CHEST 3/18/2021 10:06 am COMPARISON: 04/01/2012 HISTORY: ORDERING SYSTEM PROVIDED HISTORY: a fib TECHNOLOGIST PROVIDED HISTORY: Reason for exam:->a fib Reason for Exam: a fib FINDINGS: Cardiomegaly was noted without pneumonia, interstitial edema or pleural effusions. No hilar mass was identified. The regional skeleton was unremarkable. No significant change has occurred from 04/01/2012. Cardiomegaly without pneumonia, interstitial edema or pleural effusion. No significant change has occurred from 04/01/2012. Cta Head Neck W Contrast    Result Date: 3/18/2021  EXAMINATION: CTA OF THE HEAD AND NECK WITH CONTRAST 3/18/2021 11:28 am: TECHNIQUE: CTA of the head and neck was performed with the administration of intravenous contrast. Multiplanar reformatted images are provided for review. MIP images are provided for review. Stenosis of the internal carotid arteries measured using NASCET criteria. Dose modulation, iterative reconstruction, and/or weight based adjustment of the mA/kV was utilized to reduce the radiation dose to as low as reasonably achievable.  COMPARISON: None HISTORY: ORDERING SYSTEM PROVIDED HISTORY: off balance TECHNOLOGIST PROVIDED HISTORY: Reason for exam:->off balance Decision Support Exception->Emergency Medical Condition (MA) Reason for Exam: off balance Acuity: Acute Type of Exam: Initial Additional signs and outpatient follow-up with a primary care provider for recheck. We did discuss if he has any new or worsening signs or symptoms, new neurologic symptoms he should return for reevaluation. He voiced understanding the discharge instructions and return precautions. Differential Diagnosis: BPPV, CVA/TIA, middle ear infection/effusion, vascular dissection, migraine, meniere disease, vestibular neuronitis, schwannoma, intracranial mass, dehydration, ACS, PE, arrhythmia    The likelihood of other entities in the differential is insufficient to justify any further testing for them. This was explained to the patient. The patient was advised that persistent or worsening symptoms would require further evaluation.       Clinical  IMPRESSION    Dizziness, persistent a fib            (Please note that this note was completed with a voice recognition program.  Every attempt was made to edit the dictations, but inevitably there remain words that are mis-transcribed.)      Mercedes Kocher, DO  03/19/21 0644

## 2021-03-18 NOTE — ED NOTES
Deborah Leavitt, 78year old male present to the ED with AFIB. Patient states his symptoms started on Monday. Per patient he has a hx of AFIB. Patient is on Coumadin and gets it checked monthly. Per patient he is light headed and dizzy since Monday, he was suppose to get his Coumadin level checked today but decided to come to the ED due to his on going symptoms. Patient ambulated via wheelchair to room 25, vitals completed and patient placed in a gown and on the monitor. Patient states he had covid shot 2 weeks ago     Carolina Spicer  03/18/21 0927       Carolina Spicer  03/18/21 0930

## 2021-03-18 NOTE — PROGRESS NOTES
Medication History  [facility]    Patient Name: Yordan Samson 1950     Medication history has been completed by: Michelle Zepeda CPhT    Source(s) of information: patient, wife and insurance claims     Primary Care Physician: Monique Mayo MD     Pharmacy: [facility]     Allergies as of 03/18/2021    (No Known Allergies)        Prior to Admission medications    Medication Sig Start Date End Date Taking? Authorizing Provider   warfarin (COUMADIN) 2.5 MG tablet  Take by mouth See Admin Instructions 03/18/21 patient takes 2.5 mg every evening except on Mondays no therapy 1/7/21  Yes Su Holcomb MD   amLODIPine [facility]) 5 MG tablet  Take 5 mg by mouth daily  11/10/20  Yes Su Holcomb MD   hydroCHLOROthiazide (HYDRODIURIL) 25 MG tablet Take 1 tablet by mouth daily 11/10/20  Yes Su Holcomb MD   losartan (COZAAR) 100 MG tablet Take 1 tablet by mouth daily 11/10/20  Yes Su Holcomb MD   potassium chloride (KLOR-CON M20) 20 MEQ extended release tablet Take 20 mEq by mouth daily  11/10/20  Yes Su Holcomb MD   azelastine (OPTIVAR) 0.05 % ophthalmic solution Place 1 drop into both eyes 2 times daily 11/19/19  Yes Darius Dailey PA-C   fluticasone (FLONASE ALLERGY RELIEF) 50 MCG/ACT nasal spray 2 sprays by Each Nostril route daily   Yes Historical Provider, MD   [place]-3 1000 MG CAPS Take 1 capsule by mouth daily   Yes Historical Provider, MD   Turmeric 500 MG TABS Take 1 tablet by mouth daily   Yes Historical Provider, MD   vitamin D-3 (CHOLECALCIFEROL) 125 MCG (5000 UT) TABS Take 5,000 capsules by mouth daily    Yes Historical Provider, MD   sildenafil (VIAGRA) 100 MG tablet Take 1 tablet by mouth daily as needed for Erectile Dysfunction 11/10/20   Su Holcomb MD     Medications added or changed (ex. new medication, dosage change, interval change, formulation change):   Warfarin dosage clarified  Vitamin D dosage change from 4000 units to 5000 units daily. Medications removed from list (include reason, ex. noncompliance, medication cost, therapy complete etc.):   Colchicine therapy complete    Comments:  Medication list reviewed with patient, wife and insurance claims verified. Warfarin therapy updated as patient takes 2.5 mg every evening except on Mondays no therapy. Last dose 2.5 mg 03/17/21. Patient states he has taken first dose of medications today.     To my knowledge the above medication history is accurate as of 3/18/2021 11:37 AM.   Christine Cordova CPhT   3/18/2021 11:37 AM

## 2021-03-23 LAB
EKG DIAGNOSIS: NORMAL
EKG Q-T INTERVAL: 392 MS
EKG QRS DURATION: 90 MS
EKG QTC CALCULATION (BAZETT): 416 MS
EKG R AXIS: -42 DEGREES
EKG T AXIS: 49 DEGREES
EKG VENTRICULAR RATE: 68 BPM

## 2021-04-15 ENCOUNTER — NURSE ONLY (OUTPATIENT)
Dept: FAMILY MEDICINE CLINIC | Age: 71
End: 2021-04-15
Payer: MEDICARE

## 2021-04-15 ENCOUNTER — ANTI-COAG VISIT (OUTPATIENT)
Dept: FAMILY MEDICINE CLINIC | Age: 71
End: 2021-04-15

## 2021-04-15 DIAGNOSIS — I48.91 ATRIAL FIBRILLATION, UNSPECIFIED TYPE (HCC): ICD-10-CM

## 2021-04-15 DIAGNOSIS — I48.91 ATRIAL FIBRILLATION, UNSPECIFIED TYPE (HCC): Primary | ICD-10-CM

## 2021-04-15 LAB
INTERNATIONAL NORMALIZATION RATIO, POC: 2
PROTHROMBIN TIME, POC: NORMAL

## 2021-04-15 PROCEDURE — 85610 PROTHROMBIN TIME: CPT | Performed by: FAMILY MEDICINE

## 2021-04-15 RX ORDER — WARFARIN SODIUM 2.5 MG/1
2.5 TABLET ORAL SEE ADMIN INSTRUCTIONS
Qty: 90 TABLET | Refills: 0 | Status: SHIPPED | OUTPATIENT
Start: 2021-04-15 | End: 2021-07-09 | Stop reason: SDUPTHER

## 2021-05-12 ENCOUNTER — OFFICE VISIT (OUTPATIENT)
Dept: FAMILY MEDICINE CLINIC | Age: 71
End: 2021-05-12
Payer: MEDICARE

## 2021-05-12 VITALS
SYSTOLIC BLOOD PRESSURE: 126 MMHG | HEIGHT: 75 IN | WEIGHT: 315 LBS | HEART RATE: 63 BPM | DIASTOLIC BLOOD PRESSURE: 82 MMHG | OXYGEN SATURATION: 98 % | BODY MASS INDEX: 39.17 KG/M2

## 2021-05-12 DIAGNOSIS — J30.9 ALLERGIC RHINITIS, UNSPECIFIED SEASONALITY, UNSPECIFIED TRIGGER: Primary | ICD-10-CM

## 2021-05-12 DIAGNOSIS — I42.9 CARDIOMYOPATHY, UNSPECIFIED TYPE (HCC): ICD-10-CM

## 2021-05-12 DIAGNOSIS — I48.91 ATRIAL FIBRILLATION, UNSPECIFIED TYPE (HCC): ICD-10-CM

## 2021-05-12 DIAGNOSIS — I10 ESSENTIAL HYPERTENSION: ICD-10-CM

## 2021-05-12 DIAGNOSIS — G47.33 OBSTRUCTIVE SLEEP APNEA SYNDROME: ICD-10-CM

## 2021-05-12 DIAGNOSIS — E66.01 CLASS 3 SEVERE OBESITY DUE TO EXCESS CALORIES WITHOUT SERIOUS COMORBIDITY WITH BODY MASS INDEX (BMI) OF 40.0 TO 44.9 IN ADULT (HCC): ICD-10-CM

## 2021-05-12 DIAGNOSIS — E78.5 HYPERLIPIDEMIA, UNSPECIFIED HYPERLIPIDEMIA TYPE: ICD-10-CM

## 2021-05-12 LAB
A/G RATIO: 1.6 (ref 1.1–2.2)
ALBUMIN SERPL-MCNC: 4.2 G/DL (ref 3.4–5)
ALP BLD-CCNC: 48 U/L (ref 40–129)
ALT SERPL-CCNC: 21 U/L (ref 10–40)
ANION GAP SERPL CALCULATED.3IONS-SCNC: 13 MMOL/L (ref 3–16)
AST SERPL-CCNC: 26 U/L (ref 15–37)
BILIRUB SERPL-MCNC: 1.1 MG/DL (ref 0–1)
BUN BLDV-MCNC: 15 MG/DL (ref 7–20)
CALCIUM SERPL-MCNC: 9.2 MG/DL (ref 8.3–10.6)
CHLORIDE BLD-SCNC: 103 MMOL/L (ref 99–110)
CHOLESTEROL, TOTAL: 164 MG/DL (ref 0–199)
CO2: 26 MMOL/L (ref 21–32)
CREAT SERPL-MCNC: 0.8 MG/DL (ref 0.8–1.3)
GFR AFRICAN AMERICAN: >60
GFR NON-AFRICAN AMERICAN: >60
GLOBULIN: 2.7 G/DL
GLUCOSE BLD-MCNC: 109 MG/DL (ref 70–99)
HCT VFR BLD CALC: 49.8 % (ref 40.5–52.5)
HDLC SERPL-MCNC: 42 MG/DL (ref 40–60)
HEMOGLOBIN: 17.1 G/DL (ref 13.5–17.5)
LDL CHOLESTEROL CALCULATED: 94 MG/DL
MCH RBC QN AUTO: 32.6 PG (ref 26–34)
MCHC RBC AUTO-ENTMCNC: 34.3 G/DL (ref 31–36)
MCV RBC AUTO: 95.1 FL (ref 80–100)
PDW BLD-RTO: 13.6 % (ref 12.4–15.4)
PLATELET # BLD: 209 K/UL (ref 135–450)
PMV BLD AUTO: 8.4 FL (ref 5–10.5)
POTASSIUM SERPL-SCNC: 4.1 MMOL/L (ref 3.5–5.1)
RBC # BLD: 5.23 M/UL (ref 4.2–5.9)
SODIUM BLD-SCNC: 142 MMOL/L (ref 136–145)
TOTAL PROTEIN: 6.9 G/DL (ref 6.4–8.2)
TRIGL SERPL-MCNC: 142 MG/DL (ref 0–150)
VLDLC SERPL CALC-MCNC: 28 MG/DL
WBC # BLD: 7.6 K/UL (ref 4–11)

## 2021-05-12 PROCEDURE — 99214 OFFICE O/P EST MOD 30 MIN: CPT | Performed by: FAMILY MEDICINE

## 2021-05-12 RX ORDER — POTASSIUM CHLORIDE 20 MEQ/1
20 TABLET, EXTENDED RELEASE ORAL DAILY
Qty: 90 TABLET | Refills: 1 | Status: SHIPPED | OUTPATIENT
Start: 2021-05-12 | End: 2021-11-15 | Stop reason: SDUPTHER

## 2021-05-12 RX ORDER — HYDROCHLOROTHIAZIDE 25 MG/1
25 TABLET ORAL DAILY
Qty: 90 TABLET | Refills: 1 | Status: SHIPPED | OUTPATIENT
Start: 2021-05-12 | End: 2021-11-15 | Stop reason: SDUPTHER

## 2021-05-12 RX ORDER — LOSARTAN POTASSIUM 100 MG/1
100 TABLET ORAL DAILY
Qty: 90 TABLET | Refills: 1 | Status: SHIPPED | OUTPATIENT
Start: 2021-05-12 | End: 2021-11-15 | Stop reason: SDUPTHER

## 2021-05-12 RX ORDER — AMLODIPINE BESYLATE 5 MG/1
5 TABLET ORAL DAILY
Qty: 90 TABLET | Refills: 1 | Status: SHIPPED | OUTPATIENT
Start: 2021-05-12 | End: 2021-11-15 | Stop reason: SDUPTHER

## 2021-05-12 ASSESSMENT — ENCOUNTER SYMPTOMS
ABDOMINAL PAIN: 0
EYE PAIN: 0
BLOOD IN STOOL: 0
SHORTNESS OF BREATH: 0
WHEEZING: 0
NAUSEA: 0
CHEST TIGHTNESS: 0
VOMITING: 0
APNEA: 1
DIARRHEA: 0
TROUBLE SWALLOWING: 0

## 2021-05-12 ASSESSMENT — PATIENT HEALTH QUESTIONNAIRE - PHQ9
SUM OF ALL RESPONSES TO PHQ QUESTIONS 1-9: 0
SUM OF ALL RESPONSES TO PHQ9 QUESTIONS 1 & 2: 0

## 2021-05-12 NOTE — PROGRESS NOTES
5/12/2021    Taras Elam    Chief Complaint   Patient presents with    6 Month Follow-Up       HPI  History was obtained from the patient. Demar Hansen is a 78 y.o. male who presents today with follow-up on hypertension, allergies, and chronic atrial fib(on Coumadin). Also follow-up on increased weight, gout, sleep apnea, and hyperlipidemia. Patient state he is enjoying alf considering beginning to  part-time. Labs in November 2020 were satisfactory. Today's INR was 2.7- he will con't same coumadin dose and recheck in 3-4 weeks. Berta Parksdavid He does have a little bit of left eye irritation been there for several months -I advised because the chronicity of it to see his ophthalmologist.  He does use his CPAP machine for sleep apnea and is definitely helps with energy and daytime sedation. He has not had any gout flares with been no appreciable change in weight does continue to fight atrial fibrillation but no symptomatic palpitations reported. Has allergies they are mildly flared but not severe. Meds are tolerated blood pressure looks good. On review he will need refills and labs. Patient has been trying to exercise about every other day at [employer]. REVIEW OF SYMPTOMS    Review of Systems   Constitutional: Negative for activity change and fatigue. HENT: Negative for congestion, hearing loss, mouth sores and trouble swallowing. Eyes: Negative for pain and visual disturbance. Respiratory: Positive for apnea. Negative for chest tightness, shortness of breath and wheezing. Cardiovascular: Positive for palpitations. Negative for chest pain. Gastrointestinal: Negative for abdominal pain, blood in stool, diarrhea, nausea and vomiting. Endocrine: Negative for cold intolerance, polydipsia and polyuria. Genitourinary: Negative for dysuria, frequency and urgency. Musculoskeletal: Positive for arthralgias. Negative for gait problem and neck stiffness. Skin: Negative for rash. Allergic/Immunologic: Positive for environmental allergies. Neurological: Negative for dizziness, seizures, speech difficulty and weakness. Hematological: Does not bruise/bleed easily. Psychiatric/Behavioral: Negative for agitation, confusion, dysphoric mood, hallucinations and suicidal ideas. The patient is not nervous/anxious. PAST MEDICAL HISTORY  Past Medical History:   Diagnosis Date    Allergic rhinitis 5/24/2019    At[facility] fibrillation [address] [address]     Cancer ([facility] [address]     skin    Cardiomyopathy ([facility] [address] 5/24/2019    Hilar lymphadenopathy 5/24/2019    calcified    Hyperlipidemia 5/24/2019    Hypertension     Impotence 5/24/2019    Obstructive sleep apnea syndrome 5/24/2019    Peripheral venous insufficiency 5/24/2019    Sleep apnea        FAMILY HISTORY  No family history on file.     SOCIAL HISTORY  Social History     Socioeconomic History    Marital status:      Spouse name: Not on file    Number of children: Not on file    Years of education: Not on file    Highest education level: Not on file   Occupational History    Not on file   Social Needs    Financial resource strain: Not on file    Food insecurity     Worry: Not on file     Inability: Not on file    Transportation needs     Medical: Not on file     Non-medical: Not on file   Tobacco Use    Smoking status: Never Smoker    Smokeless tobacco: Never Used   Substance and Sexual Activity    Alcohol use: No    Drug use: No    Sexual activity: Not on file   Lifestyle    Physical activity     Days per week: Not on file     Minutes per session: Not on file    Stress: Not on file   Relationships    Social connections     Talks on phone: Not on file     Gets together: Not on file     Attends [employer] service: Not on file     Active member of club or organization: Not on file     Attends meetings of clubs or organizations: Not on file     Relationship status: Not on file    Intimate partner violence     Fear of current or ex partner: Not on file     Emotionally abused: Not on file     Physically abused: Not on file     Forced sexual activity: Not on file   Other Topics Concern    Not on file   Social History Narrative    Not on file        SURGICAL HISTORY  Past Surgical History:   Procedure Laterality Date    APPENDECTOMY      CARDIOVERSION      KNEE ARTHROSCOPY      right knee                 CURRENT MEDICATIONS  Current Outpatient Medications   Medication Sig Dispense Refill    Aspirin Buf,CaCarb-MgCarb-MgO, 81 MG TABS Take 1 tablet by mouth daily      amLODIPine (NORVASC) 5 MG tablet Take 1 tablet by mouth daily 90 tablet 1    hydroCHLOROthiazide (HYDRODIURIL) 25 MG tablet Take 1 tablet by mouth daily 90 tablet 1    losartan (COZAAR) 100 MG tablet Take 1 tablet by mouth daily 90 tablet 1    potassium chloride (KLOR-CON M20) 20 MEQ extended release tablet Take 1 tablet by mouth daily 90 tablet 1    warfarin (COUMADIN) 2.5 MG tablet Take 1 tablet by mouth See Admin Instructions 03/18/21 patient takes 2.5 mg every evening except on Mondays no therapy 90 tablet 0    azelastine (OPTIVAR) 0.05 % ophthalmic solution Place 1 drop into both eyes 2 times daily 1 Bottle 5    fluticasone (FLONASE ALLERGY RELIEF) 50 MCG/ACT nasal spray 2 sprays by Each Nostril route daily      Omega-3 1000 MG CAPS Take 1 capsule by mouth daily      Turmeric 500 MG TABS Take 1 tablet by mouth daily      vitamin D-3 (CHOLECALCIFEROL) 125 MCG (5000 UT) TABS Take 5,000 capsules by mouth daily       sildenafil (VIAGRA) 100 MG tablet Take 1 tablet by mouth daily as needed for Erectile Dysfunction (Patient not taking: Reported on 5/12/2021) 30 tablet 1     No current facility-administered medications for this visit.         ALLERGIES  No Known Allergies    PHYSICAL EXAM    /82 (Site: Right Upper Arm, Position: Sitting, Cuff Size: Medium Adult)   Pulse 63   Ht 6' 2.5\" (1.892 m)   Wt (!) 361 lb 9.6 oz (164 kg)   SpO2 98%   BMI 45.81 kg/m² Physical Exam  Vitals signs and nursing note reviewed. Constitutional:       General: He is not in acute distress. Appearance: He is well-developed. He is obese. He is not ill-appearing or toxic-appearing. HENT:      Head: Normocephalic and atraumatic. Nose: Nose normal.      Mouth/Throat:      Mouth: Mucous membranes are moist.   Eyes:      Pupils: Pupils are equal, round, and reactive to light. Neck:      Musculoskeletal: Normal range of motion and neck supple. Cardiovascular:      Rate and Rhythm: Normal rate and regular rhythm. Heart sounds: Normal heart sounds. Pulmonary:      Effort: Pulmonary effort is normal. No respiratory distress. Breath sounds: Normal breath sounds. No wheezing, rhonchi or rales. Abdominal:      Palpations: Abdomen is soft. Musculoskeletal: Normal range of motion. General: No swelling or deformity. Skin:     General: Skin is warm and dry. Neurological:      General: No focal deficit present. Mental Status: He is alert and oriented to person, place, and time. Cranial Nerves: No cranial nerve deficit. Motor: No weakness. Gait: Gait normal.   Psychiatric:         Mood and Affect: Mood normal.         Behavior: Behavior normal.         Thought Content: Thought content normal.         ASSESSMENT & PLAN     Diagnosis Orders   1. Allergic rhinitis, unspecified seasonality, unspecified trigger     2. Essential hypertension  amLODIPine (NORVASC) 5 MG tablet    potassium chloride (KLOR-CON M20) 20 MEQ extended release tablet    CBC    COMPREHENSIVE METABOLIC PANEL   3. Cardiomyopathy, unspecified type ([facility] [address]     4. Class 3 severe obesity due to excess calories without serious comorbidity with body mass index (BMI) of 40.0 to 44.9 in adult ([address]     5. Atrial fibrillation, unspecified type (HCC)  CBC   6. Hyperlipidemia, unspecified hyperlipidemia type  LIPID PANEL   7. Obstructive sleep apnea syndrome     Refills to be provided.

## 2021-06-11 ENCOUNTER — NURSE ONLY (OUTPATIENT)
Dept: FAMILY MEDICINE CLINIC | Age: 71
End: 2021-06-11
Payer: MEDICARE

## 2021-06-11 DIAGNOSIS — I48.91 ATRIAL FIBRILLATION, UNSPECIFIED TYPE (HCC): ICD-10-CM

## 2021-06-11 LAB
INTERNATIONAL NORMALIZATION RATIO, POC: 2
PROTHROMBIN TIME, POC: NORMAL

## 2021-06-11 PROCEDURE — 85610 PROTHROMBIN TIME: CPT | Performed by: FAMILY MEDICINE

## 2021-06-11 NOTE — PROGRESS NOTES
Confirmed current coumadin 2.5mg daily x 6 d & 0 x 1 d.  INR 2.0. per sacha keep same recheck in 4 weeks

## 2021-07-09 ENCOUNTER — ANTI-COAG VISIT (OUTPATIENT)
Dept: FAMILY MEDICINE CLINIC | Age: 71
End: 2021-07-09

## 2021-07-09 ENCOUNTER — NURSE ONLY (OUTPATIENT)
Dept: FAMILY MEDICINE CLINIC | Age: 71
End: 2021-07-09
Payer: MEDICARE

## 2021-07-09 DIAGNOSIS — I48.91 ATRIAL FIBRILLATION, UNSPECIFIED TYPE (HCC): ICD-10-CM

## 2021-07-09 LAB
INTERNATIONAL NORMALIZATION RATIO, POC: 2.2
PROTHROMBIN TIME, POC: NORMAL

## 2021-07-09 PROCEDURE — 85610 PROTHROMBIN TIME: CPT | Performed by: FAMILY MEDICINE

## 2021-07-09 PROCEDURE — 99999 PR OFFICE/OUTPT VISIT,PROCEDURE ONLY: CPT | Performed by: FAMILY MEDICINE

## 2021-07-09 RX ORDER — WARFARIN SODIUM 2.5 MG/1
2.5 TABLET ORAL SEE ADMIN INSTRUCTIONS
Qty: 90 TABLET | Refills: 0 | Status: SHIPPED | OUTPATIENT
Start: 2021-07-09 | End: 2021-11-15 | Stop reason: SDUPTHER

## 2021-08-06 ENCOUNTER — NURSE ONLY (OUTPATIENT)
Dept: FAMILY MEDICINE CLINIC | Age: 71
End: 2021-08-06
Payer: MEDICARE

## 2021-08-06 DIAGNOSIS — I48.91 ATRIAL FIBRILLATION, UNSPECIFIED TYPE (HCC): ICD-10-CM

## 2021-08-06 LAB
INTERNATIONAL NORMALIZATION RATIO, POC: 2.4
PROTHROMBIN TIME, POC: NORMAL

## 2021-08-06 PROCEDURE — 85610 PROTHROMBIN TIME: CPT | Performed by: FAMILY MEDICINE

## 2021-08-06 NOTE — PROGRESS NOTES
Confirmed current coumadin 2.5mg daily x 6 d & 0 x 1 d.  INR 2.4. pt to keep same dose and recheck in 4 weeks

## 2021-09-03 ENCOUNTER — ANTI-COAG VISIT (OUTPATIENT)
Dept: FAMILY MEDICINE CLINIC | Age: 71
End: 2021-09-03

## 2021-09-03 ENCOUNTER — NURSE ONLY (OUTPATIENT)
Dept: FAMILY MEDICINE CLINIC | Age: 71
End: 2021-09-03
Payer: MEDICARE

## 2021-09-03 DIAGNOSIS — I48.91 ATRIAL FIBRILLATION, UNSPECIFIED TYPE (HCC): ICD-10-CM

## 2021-09-03 LAB
INTERNATIONAL NORMALIZATION RATIO, POC: 2.3
PROTHROMBIN TIME, POC: NORMAL

## 2021-09-03 PROCEDURE — 85610 PROTHROMBIN TIME: CPT | Performed by: FAMILY MEDICINE

## 2021-10-01 ENCOUNTER — NURSE ONLY (OUTPATIENT)
Dept: FAMILY MEDICINE CLINIC | Age: 71
End: 2021-10-01
Payer: MEDICARE

## 2021-10-01 DIAGNOSIS — I48.91 ATRIAL FIBRILLATION, UNSPECIFIED TYPE (HCC): ICD-10-CM

## 2021-10-01 LAB
INTERNATIONAL NORMALIZATION RATIO, POC: 2.2
PROTHROMBIN TIME, POC: NORMAL

## 2021-10-01 PROCEDURE — 85610 PROTHROMBIN TIME: CPT | Performed by: FAMILY MEDICINE

## 2021-11-15 ENCOUNTER — OFFICE VISIT (OUTPATIENT)
Dept: FAMILY MEDICINE CLINIC | Age: 71
End: 2021-11-15
Payer: MEDICARE

## 2021-11-15 VITALS — WEIGHT: 315 LBS | BODY MASS INDEX: 39.17 KG/M2 | HEIGHT: 75 IN

## 2021-11-15 DIAGNOSIS — E78.5 HYPERLIPIDEMIA, UNSPECIFIED HYPERLIPIDEMIA TYPE: Primary | ICD-10-CM

## 2021-11-15 DIAGNOSIS — I10 ESSENTIAL HYPERTENSION: ICD-10-CM

## 2021-11-15 DIAGNOSIS — I42.9 CARDIOMYOPATHY, UNSPECIFIED TYPE (HCC): ICD-10-CM

## 2021-11-15 DIAGNOSIS — M1A.09X0 IDIOPATHIC CHRONIC GOUT OF MULTIPLE SITES WITHOUT TOPHUS: ICD-10-CM

## 2021-11-15 DIAGNOSIS — I48.91 ATRIAL FIBRILLATION, UNSPECIFIED TYPE (HCC): ICD-10-CM

## 2021-11-15 DIAGNOSIS — G47.33 OBSTRUCTIVE SLEEP APNEA SYNDROME: ICD-10-CM

## 2021-11-15 LAB — INTERNATIONAL NORMALIZATION RATIO, POC: 2.3

## 2021-11-15 PROCEDURE — 85610 PROTHROMBIN TIME: CPT | Performed by: FAMILY MEDICINE

## 2021-11-15 PROCEDURE — 3288F FALL RISK ASSESSMENT DOCD: CPT | Performed by: FAMILY MEDICINE

## 2021-11-15 PROCEDURE — 99214 OFFICE O/P EST MOD 30 MIN: CPT | Performed by: FAMILY MEDICINE

## 2021-11-15 RX ORDER — HYDROCHLOROTHIAZIDE 25 MG/1
25 TABLET ORAL DAILY
Qty: 90 TABLET | Refills: 1 | Status: SHIPPED | OUTPATIENT
Start: 2021-11-15 | End: 2022-05-16 | Stop reason: SDUPTHER

## 2021-11-15 RX ORDER — POTASSIUM CHLORIDE 20 MEQ/1
20 TABLET, EXTENDED RELEASE ORAL DAILY
Qty: 90 TABLET | Refills: 1 | Status: SHIPPED | OUTPATIENT
Start: 2021-11-15 | End: 2022-05-16 | Stop reason: SDUPTHER

## 2021-11-15 RX ORDER — WARFARIN SODIUM 2.5 MG/1
2.5 TABLET ORAL SEE ADMIN INSTRUCTIONS
Qty: 90 TABLET | Refills: 1 | Status: SHIPPED | OUTPATIENT
Start: 2021-11-15 | End: 2022-05-16 | Stop reason: SDUPTHER

## 2021-11-15 RX ORDER — LOSARTAN POTASSIUM 100 MG/1
100 TABLET ORAL DAILY
Qty: 90 TABLET | Refills: 1 | Status: SHIPPED | OUTPATIENT
Start: 2021-11-15 | End: 2022-05-16 | Stop reason: SDUPTHER

## 2021-11-15 RX ORDER — AZELASTINE HYDROCHLORIDE 0.5 MG/ML
1 SOLUTION/ DROPS OPHTHALMIC 2 TIMES DAILY
Qty: 1 EACH | Refills: 5 | Status: SHIPPED | OUTPATIENT
Start: 2021-11-15

## 2021-11-15 RX ORDER — AMLODIPINE BESYLATE 5 MG/1
5 TABLET ORAL DAILY
Qty: 90 TABLET | Refills: 1 | Status: SHIPPED | OUTPATIENT
Start: 2021-11-15 | End: 2022-05-16 | Stop reason: SDUPTHER

## 2021-11-15 ASSESSMENT — ENCOUNTER SYMPTOMS
DIARRHEA: 0
NAUSEA: 0
APNEA: 1
TROUBLE SWALLOWING: 0
SHORTNESS OF BREATH: 0
BLOOD IN STOOL: 0
WHEEZING: 0
CHEST TIGHTNESS: 0
ABDOMINAL PAIN: 0
EYE PAIN: 0
VOMITING: 0

## 2021-11-15 NOTE — PROGRESS NOTES
11/15/2021    Minna Arechiga    Chief Complaint   Patient presents with    6 Month Follow-Up     2.5 mg x 6 days a week. HPI  History was obtained from the patient. Lauryn Quinn is a 73 y.o. male who presents today with routine follow-up on atrial fib on Coumadin therapy, history of cardiomyopathy, allergies, hyperlipidemia, obesity, sleep apnea on CPAP, and gout. 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 INR today I believe was 1.4 his Coumadin will be titrated to 2.5 mg 7 days a week with a recheck an INR in about 3 weeks. Otherwise labs in May 2021 look satisfactory. Allergies are out mild at this time. He has had no increased chest pain or shortness of breath. Uses CPAP faithfully with good benefit for daytime sedation and fatigue. Not had a recent gout flare. Weight remains elevated. Overall meds are tolerated he would benefit from seeing his cardiologist.. REVIEW OF SYMPTOMS    Review of Systems   Constitutional: Negative for activity change and fatigue. HENT: Negative for congestion, hearing loss, mouth sores and trouble swallowing. Eyes: Negative for pain and visual disturbance. Respiratory: Positive for apnea. Negative for chest tightness, shortness of breath and wheezing. Cardiovascular: Positive for palpitations. Negative for chest pain. Gastrointestinal: Negative for abdominal pain, blood in stool, diarrhea, nausea and vomiting. Endocrine: Negative for cold intolerance, polydipsia and polyuria. Genitourinary: Negative for dysuria, frequency and urgency. Musculoskeletal: Negative for arthralgias, gait problem and neck stiffness. Skin: Negative for rash. Allergic/Immunologic: Negative for environmental allergies. Neurological: Negative for dizziness, seizures, speech difficulty and weakness. Hematological: Does not bruise/bleed easily.    Psychiatric/Behavioral: Negative for agitation, confusion, dysphoric mood, hallucinations and suicidal ideas. The patient is not nervous/anxious. PAST MEDICAL HISTORY  Past Medical History:   Diagnosis Date    Allergic rhinitis 5/24/2019    Atrial fibrillation ([facility] [address]     Cancer ([facility] [address]     skin    Cardiomyopathy ([facility] [address] 5/24/2019    Hilar lymphadenopathy 5/24/2019    calcified    Hyperlipidemia 5/24/2019    Hypertension     Impotence 5/24/2019    Obstructive sleep apnea syndrome 5/24/2019    Peripheral venous insufficiency 5/24/2019    Sleep apnea        FAMILY HISTORY  No family history on file. SOCIAL HISTORY  Social History     Socioeconomic History    Marital status:      Spouse name: None    Number of children: None    Years of education: None    Highest education level: None   Occupational History    None   Tobacco Use    Smoking status: Never Smoker    Smokeless tobacco: Never Used   Vaping Use    Vaping Use: Never used   Substance and Sexual Activity    Alcohol use: No    Drug use: No    Sexual activity: None   Other Topics Concern    None   Social History Narrative    None     Social Determinants of Health     Financial Resource Strain:     Difficulty of Paying Living Expenses: Not on file   Food Insecurity:     Worried About Running Out of Food in the Last Year: Not on file    Jadyn of Food in the Last Year: Not on file   Transportation Needs:     Lack of Transportation (Medical): Not on file    Lack of Transportation (Non-Medical):  Not on file   Physical Activity:     Days of Exercise per Week: Not on file    Minutes of Exercise per Session: Not on file   Stress:     Feeling of Stress : Not on file   Social Connections:     Frequency of Communication with Friends and Family: Not on file    Frequency of Social Gatherings with Friends and Family: Not on file    Attends Pentecostalism Services: Not on file    Active Member of Clubs or Organizations: Not on file    Attends Club or Organization Meetings: Not on file    Marital Status: Not on file Intimate Partner Violence:     Fear of Current or Ex-Partner: Not on file    Emotionally Abused: Not on file    Physically Abused: Not on file    Sexually Abused: Not on file   Housing Stability:     Unable to Pay for Housing in the Last Year: Not on file    Number of Danika in the Last Year: Not on file    Unstable Housing in the Last Year: Not on file        SURGICAL HISTORY  Past Surgical History:   Procedure Laterality Date    APPENDECTOMY      CARDIOVERSION      KNEE ARTHROSCOPY      right knee                 CURRENT MEDICATIONS  Current Outpatient Medications   Medication Sig Dispense Refill    amLODIPine (NORVASC) 5 MG tablet Take 1 tablet by mouth daily 90 tablet 1    hydroCHLOROthiazide (HYDRODIURIL) 25 MG tablet Take 1 tablet by mouth daily 90 tablet 1    losartan (COZAAR) 100 MG tablet Take 1 tablet by mouth daily 90 tablet 1    potassium chloride (KLOR-CON M20) 20 MEQ extended release tablet Take 1 tablet by mouth daily 90 tablet 1    warfarin (COUMADIN) 2.5 MG tablet Take 1 tablet by mouth See Admin Instructions 03/18/21 patient takes 2.5 mg every evening except on Mondays no therapy 90 tablet 1    azelastine (OPTIVAR) 0.05 % ophthalmic solution Place 1 drop into both eyes 2 times daily 1 each 5    Aspirin Buf,CaCarb-MgCarb-MgO, 81 MG TABS Take 1 tablet by mouth daily      sildenafil (VIAGRA) 100 MG tablet Take 1 tablet by mouth daily as needed for Erectile Dysfunction 30 tablet 1    fluticasone (FLONASE ALLERGY RELIEF) 50 MCG/ACT nasal spray 2 sprays by Each Nostril route daily      Omega-3 1000 MG CAPS Take 1 capsule by mouth daily      Turmeric 500 MG TABS Take 1 tablet by mouth daily      vitamin D-3 (CHOLECALCIFEROL) 125 MCG (5000 UT) TABS Take 5,000 capsules by mouth daily        No current facility-administered medications for this visit.        ALLERGIES  No Known Allergies    PHYSICAL EXAM    Ht 6' 2.5\" (1.892 m)   Wt (!) 369 lb (167.4 kg)   BMI 46.74 kg/m² Physical Exam  Vitals and nursing note reviewed. Constitutional:       General: He is not in acute distress. Appearance: He is well-developed. He is obese. He is not ill-appearing or toxic-appearing. HENT:      Head: Normocephalic and atraumatic. Nose: Nose normal. No congestion. Mouth/Throat:      Mouth: Mucous membranes are moist.      Pharynx: Oropharynx is clear. Eyes:      Pupils: Pupils are equal, round, and reactive to light. Cardiovascular:      Rate and Rhythm: Normal rate and regular rhythm. Heart sounds: Normal heart sounds. No murmur heard. Pulmonary:      Effort: Pulmonary effort is normal. No respiratory distress. Breath sounds: Normal breath sounds. No wheezing, rhonchi or rales. Abdominal:      Palpations: Abdomen is soft. Musculoskeletal:         General: No swelling or deformity. Normal range of motion. Cervical back: Normal range of motion and neck supple. No rigidity. Lymphadenopathy:      Cervical: No cervical adenopathy. Skin:     General: Skin is warm and dry. Coloration: Skin is not jaundiced. Findings: No bruising. Neurological:      General: No focal deficit present. Mental Status: He is alert and oriented to person, place, and time. Mental status is at baseline. Cranial Nerves: No cranial nerve deficit. Motor: No weakness. Psychiatric:         Mood and Affect: Mood normal.         Behavior: Behavior normal.         Thought Content: Thought content normal.         ASSESSMENT & PLAN     Diagnosis Orders   1. Hyperlipidemia, unspecified hyperlipidemia type  POCT INR   2. Essential hypertension  amLODIPine (NORVASC) 5 MG tablet    potassium chloride (KLOR-CON M20) 20 MEQ extended release tablet    POCT INR   3. Atrial fibrillation, unspecified type (HCC)  warfarin (COUMADIN) 2.5 MG tablet    POCT INR   4. Cardiomyopathy, unspecified type ([address]  POCT INR   5. Obstructive sleep apnea syndrome  POCT INR   6. Idiopathic chronic gout of multiple sites without tophus  POCT INR   Overall Derrek Ferguson is doing quite well. He is to continue to work on healthy lifestyle with low-fat diet and weight loss. Also work on low-salt diet. Titrate Coumadin as directed. Advised get Covid booster ASAP. Med list was reviewed and refills to be provided. He is to see his cardiologist in the near future. We will plan on extensive labs with next visit. In the meantime- he is to call with issues or problems. Return in about 6 months (around 5/15/2022).          Electronically signed by Maureen Moser MD on 11/15/2021

## 2021-12-13 ENCOUNTER — NURSE ONLY (OUTPATIENT)
Dept: FAMILY MEDICINE CLINIC | Age: 71
End: 2021-12-13
Payer: MEDICARE

## 2021-12-13 DIAGNOSIS — I48.91 ATRIAL FIBRILLATION, UNSPECIFIED TYPE (HCC): ICD-10-CM

## 2021-12-13 LAB — INTERNATIONAL NORMALIZATION RATIO, POC: 2.3

## 2021-12-13 PROCEDURE — 85610 PROTHROMBIN TIME: CPT | Performed by: FAMILY MEDICINE

## 2022-01-10 ENCOUNTER — NURSE ONLY (OUTPATIENT)
Dept: FAMILY MEDICINE CLINIC | Age: 72
End: 2022-01-10
Payer: MEDICARE

## 2022-01-10 DIAGNOSIS — I48.91 ATRIAL FIBRILLATION, UNSPECIFIED TYPE (HCC): ICD-10-CM

## 2022-01-10 LAB
INTERNATIONAL NORMALIZATION RATIO, POC: 2.3
PROTHROMBIN TIME, POC: NORMAL

## 2022-01-10 PROCEDURE — 85610 PROTHROMBIN TIME: CPT | Performed by: FAMILY MEDICINE

## 2022-02-07 ENCOUNTER — NURSE ONLY (OUTPATIENT)
Dept: FAMILY MEDICINE CLINIC | Age: 72
End: 2022-02-07
Payer: MEDICARE

## 2022-02-07 DIAGNOSIS — I48.91 ATRIAL FIBRILLATION, UNSPECIFIED TYPE (HCC): ICD-10-CM

## 2022-02-07 LAB
INTERNATIONAL NORMALIZATION RATIO, POC: 2.8
PROTHROMBIN TIME, POC: NORMAL

## 2022-02-07 PROCEDURE — 85610 PROTHROMBIN TIME: CPT | Performed by: FAMILY MEDICINE

## 2022-03-07 ENCOUNTER — NURSE ONLY (OUTPATIENT)
Dept: FAMILY MEDICINE CLINIC | Age: 72
End: 2022-03-07
Payer: MEDICARE

## 2022-03-07 DIAGNOSIS — I48.91 ATRIAL FIBRILLATION, UNSPECIFIED TYPE (HCC): ICD-10-CM

## 2022-03-07 LAB
INTERNATIONAL NORMALIZATION RATIO, POC: 2.5
PROTHROMBIN TIME, POC: NORMAL

## 2022-03-07 PROCEDURE — 85610 PROTHROMBIN TIME: CPT | Performed by: FAMILY MEDICINE

## 2022-04-04 ENCOUNTER — NURSE ONLY (OUTPATIENT)
Dept: FAMILY MEDICINE CLINIC | Age: 72
End: 2022-04-04
Payer: MEDICARE

## 2022-04-04 DIAGNOSIS — I48.91 ATRIAL FIBRILLATION, UNSPECIFIED TYPE (HCC): ICD-10-CM

## 2022-04-04 LAB
INTERNATIONAL NORMALIZATION RATIO, POC: 2.4
PROTHROMBIN TIME, POC: NORMAL

## 2022-04-04 PROCEDURE — 85610 PROTHROMBIN TIME: CPT | Performed by: FAMILY MEDICINE

## 2022-05-16 ENCOUNTER — OFFICE VISIT (OUTPATIENT)
Dept: FAMILY MEDICINE CLINIC | Age: 72
End: 2022-05-16
Payer: MEDICARE

## 2022-05-16 VITALS
BODY MASS INDEX: 39.17 KG/M2 | DIASTOLIC BLOOD PRESSURE: 76 MMHG | HEART RATE: 61 BPM | OXYGEN SATURATION: 93 % | SYSTOLIC BLOOD PRESSURE: 130 MMHG | WEIGHT: 315 LBS | HEIGHT: 75 IN

## 2022-05-16 DIAGNOSIS — E66.01 CLASS 3 SEVERE OBESITY DUE TO EXCESS CALORIES WITHOUT SERIOUS COMORBIDITY WITH BODY MASS INDEX (BMI) OF 40.0 TO 44.9 IN ADULT (HCC): ICD-10-CM

## 2022-05-16 DIAGNOSIS — I48.91 ATRIAL FIBRILLATION, UNSPECIFIED TYPE (HCC): ICD-10-CM

## 2022-05-16 DIAGNOSIS — G47.33 OBSTRUCTIVE SLEEP APNEA SYNDROME: ICD-10-CM

## 2022-05-16 DIAGNOSIS — J30.9 ALLERGIC RHINITIS, UNSPECIFIED SEASONALITY, UNSPECIFIED TRIGGER: ICD-10-CM

## 2022-05-16 DIAGNOSIS — I42.9 CARDIOMYOPATHY, UNSPECIFIED TYPE (HCC): Primary | ICD-10-CM

## 2022-05-16 DIAGNOSIS — I10 ESSENTIAL HYPERTENSION: ICD-10-CM

## 2022-05-16 DIAGNOSIS — M1A.09X0 IDIOPATHIC CHRONIC GOUT OF MULTIPLE SITES WITHOUT TOPHUS: ICD-10-CM

## 2022-05-16 DIAGNOSIS — E78.5 HYPERLIPIDEMIA, UNSPECIFIED HYPERLIPIDEMIA TYPE: ICD-10-CM

## 2022-05-16 DIAGNOSIS — R73.9 HYPERGLYCEMIA: ICD-10-CM

## 2022-05-16 LAB
A/G RATIO: 1.6 (ref 1.1–2.2)
ALBUMIN SERPL-MCNC: 4.6 G/DL (ref 3.4–5)
ALP BLD-CCNC: 59 U/L (ref 40–129)
ALT SERPL-CCNC: 19 U/L (ref 10–40)
ANION GAP SERPL CALCULATED.3IONS-SCNC: 13 MMOL/L (ref 3–16)
AST SERPL-CCNC: 26 U/L (ref 15–37)
BILIRUB SERPL-MCNC: 1.2 MG/DL (ref 0–1)
BUN BLDV-MCNC: 15 MG/DL (ref 7–20)
CALCIUM SERPL-MCNC: 9.7 MG/DL (ref 8.3–10.6)
CHLORIDE BLD-SCNC: 100 MMOL/L (ref 99–110)
CHOLESTEROL, TOTAL: 165 MG/DL (ref 0–199)
CO2: 26 MMOL/L (ref 21–32)
CREAT SERPL-MCNC: 0.8 MG/DL (ref 0.8–1.3)
GFR AFRICAN AMERICAN: >60
GFR NON-AFRICAN AMERICAN: >60
GLUCOSE BLD-MCNC: 137 MG/DL (ref 70–99)
HCT VFR BLD CALC: 50.3 % (ref 40.5–52.5)
HDLC SERPL-MCNC: 42 MG/DL (ref 40–60)
HEMOGLOBIN: 16.9 G/DL (ref 13.5–17.5)
INTERNATIONAL NORMALIZATION RATIO, POC: 2.5
LDL CHOLESTEROL CALCULATED: 98 MG/DL
MCH RBC QN AUTO: 31.9 PG (ref 26–34)
MCHC RBC AUTO-ENTMCNC: 33.6 G/DL (ref 31–36)
MCV RBC AUTO: 94.8 FL (ref 80–100)
PDW BLD-RTO: 13.5 % (ref 12.4–15.4)
PLATELET # BLD: 231 K/UL (ref 135–450)
PMV BLD AUTO: 8.6 FL (ref 5–10.5)
POTASSIUM SERPL-SCNC: 4.1 MMOL/L (ref 3.5–5.1)
PROTHROMBIN TIME, POC: NORMAL
RBC # BLD: 5.3 M/UL (ref 4.2–5.9)
SODIUM BLD-SCNC: 139 MMOL/L (ref 136–145)
TOTAL PROTEIN: 7.4 G/DL (ref 6.4–8.2)
TRIGL SERPL-MCNC: 126 MG/DL (ref 0–150)
VLDLC SERPL CALC-MCNC: 25 MG/DL
WBC # BLD: 7.3 K/UL (ref 4–11)

## 2022-05-16 PROCEDURE — 99214 OFFICE O/P EST MOD 30 MIN: CPT | Performed by: FAMILY MEDICINE

## 2022-05-16 PROCEDURE — 85610 PROTHROMBIN TIME: CPT | Performed by: FAMILY MEDICINE

## 2022-05-16 RX ORDER — POTASSIUM CHLORIDE 20 MEQ/1
20 TABLET, EXTENDED RELEASE ORAL DAILY
Qty: 90 TABLET | Refills: 1 | Status: SHIPPED | OUTPATIENT
Start: 2022-05-16

## 2022-05-16 RX ORDER — WARFARIN SODIUM 2.5 MG/1
2.5 TABLET ORAL SEE ADMIN INSTRUCTIONS
Qty: 90 TABLET | Refills: 1 | Status: SHIPPED | OUTPATIENT
Start: 2022-05-16 | End: 2022-08-14

## 2022-05-16 RX ORDER — HYDROCHLOROTHIAZIDE 25 MG/1
25 TABLET ORAL DAILY
Qty: 90 TABLET | Refills: 1 | Status: SHIPPED | OUTPATIENT
Start: 2022-05-16

## 2022-05-16 RX ORDER — LOSARTAN POTASSIUM 100 MG/1
100 TABLET ORAL DAILY
Qty: 90 TABLET | Refills: 1 | Status: SHIPPED | OUTPATIENT
Start: 2022-05-16

## 2022-05-16 RX ORDER — AMLODIPINE BESYLATE 5 MG/1
5 TABLET ORAL DAILY
Qty: 90 TABLET | Refills: 1 | Status: SHIPPED | OUTPATIENT
Start: 2022-05-16

## 2022-05-16 SDOH — ECONOMIC STABILITY: FOOD INSECURITY: WITHIN THE PAST 12 MONTHS, THE FOOD YOU BOUGHT JUST DIDN'T LAST AND YOU DIDN'T HAVE MONEY TO GET MORE.: NEVER TRUE

## 2022-05-16 SDOH — ECONOMIC STABILITY: FOOD INSECURITY: WITHIN THE PAST 12 MONTHS, YOU WORRIED THAT YOUR FOOD WOULD RUN OUT BEFORE YOU GOT MONEY TO BUY MORE.: NEVER TRUE

## 2022-05-16 ASSESSMENT — ENCOUNTER SYMPTOMS
BLOOD IN STOOL: 0
NAUSEA: 0
EYE PAIN: 0
VOMITING: 0
DIARRHEA: 0
CHEST TIGHTNESS: 0
COUGH: 1
WHEEZING: 0
TROUBLE SWALLOWING: 0
SHORTNESS OF BREATH: 0
ABDOMINAL PAIN: 0
APNEA: 1

## 2022-05-16 ASSESSMENT — PATIENT HEALTH QUESTIONNAIRE - PHQ9
SUM OF ALL RESPONSES TO PHQ QUESTIONS 1-9: 0
SUM OF ALL RESPONSES TO PHQ QUESTIONS 1-9: 0
SUM OF ALL RESPONSES TO PHQ9 QUESTIONS 1 & 2: 0
SUM OF ALL RESPONSES TO PHQ QUESTIONS 1-9: 0
SUM OF ALL RESPONSES TO PHQ QUESTIONS 1-9: 0
2. FEELING DOWN, DEPRESSED OR HOPELESS: 0
1. LITTLE INTEREST OR PLEASURE IN DOING THINGS: 0

## 2022-05-16 ASSESSMENT — SOCIAL DETERMINANTS OF HEALTH (SDOH): HOW HARD IS IT FOR YOU TO PAY FOR THE VERY BASICS LIKE FOOD, HOUSING, MEDICAL CARE, AND HEATING?: NOT HARD AT ALL

## 2022-05-16 NOTE — PROGRESS NOTES
5/16/2022    [facility]    Chief Complaint   Patient presents with    Pharyngitis     X Couple of Weeks    Other     Food tasting funny X a few weeks. HPI  History was obtained from the patient. Patricia Baez is a 73 y.o. male who presents today with routine follow-up on atrial fibs, hypertension, increased weight, cardiomyopathy, hyperlipidemia, sleep apnea on CPAP, mild allergies, and hyperglycemia. Overall he has been feeling well trying to stay active INR today was 2.5 he has had COVID shot and 1 booster. Patient is having a lot of postnasal drainage which allergy symptoms no high fever. Has a little bit of taste disturbance but that may be from allergy drainage. No flare of shortness of breath no flare of palpitations. He has been very faithful with CPAP. On review he definitely needs refills and some recheck on labs. [place] REVIEW OF SYMPTOMS    Review of Systems   Constitutional: Negative for activity change and fatigue. HENT: Positive for congestion. Negative for hearing loss, mouth sores and trouble swallowing. Eyes: Negative for pain and visual disturbance. Respiratory: Positive for apnea and cough. Negative for chest tightness, shortness of breath and wheezing. Cardiovascular: Negative for chest pain and palpitations. Gastrointestinal: Negative for abdominal pain, blood in stool, diarrhea, nausea and vomiting. Endocrine: Negative for polydipsia and polyuria. Genitourinary: Negative for dysuria, frequency and urgency. Musculoskeletal: Negative for arthralgias, gait problem and neck stiffness. Skin: Negative for rash. Allergic/Immunologic: Positive for environmental allergies. Neurological: Negative for dizziness, seizures, speech difficulty and weakness. Hematological: Does not bruise/bleed easily. Psychiatric/Behavioral: Negative for agitation, confusion, dysphoric mood, hallucinations, self-injury and suicidal ideas. The patient is not nervous/anxious.         PAST MEDICAL HISTORY  Past Medical History:   Diagnosis Date    Allergic rhinitis 5/24/2019    Atrial fibrillation ([facility]     Cancer ([facility]     skin    Cardiomyopathy ([facility] 5/24/2019    Hilar lymphadenopathy 5/24/2019    calcified    Hyperlipidemia 5/24/2019    Hypertension     Impotence 5/24/2019    Obstructive sleep apnea syndrome 5/24/2019    Peripheral venous insufficiency 5/24/2019    Sleep apnea        FAMILY HISTORY  No family history on file. SOCIAL HISTORY  Social History     Socioeconomic History    Marital status:      Spouse name: None    Number of children: None    Years of education: None    Highest education level: None   Occupational History    None   Tobacco Use    Smoking status: Never Smoker    Smokeless tobacco: Never Used   Vaping Use    Vaping Use: Never used   Substance and Sexual Activity    Alcohol use: No    Drug use: No    Sexual activity: None   Other Topics Concern    None   Social History Narrative    None     Social Determinants of Health     Financial Resource Strain: Low Risk     Difficulty of Paying Living Expenses: Not hard at all   Food Insecurity: No Food Insecurity    Worried About Running Out of Food in the Last Year: Never true    Jadyn of Food in the Last Year: Never true   Transportation Needs:     Lack of Transportation (Medical): Not on file    Lack of Transportation (Non-Medical):  Not on file   Physical Activity:     Days of Exercise per Week: Not on file    Minutes of Exercise per Session: Not on file   Stress:     Feeling of Stress : Not on file   Social Connections:     Frequency of Communication with Friends and Family: Not on file    Frequency of Social Gatherings with Friends and Family: Not on file    Attends [employer] Services: Not on file    Active Member of Clubs or Organizations: Not on file    Attends Club or Organization Meetings: Not on file    Marital Status: Not on file   Intimate Partner Violence:     Fear of Current or Ex-Partner: Not on file    Emotionally Abused: Not on file    Physically Abused: Not on file    Sexually Abused: Not on file   Housing Stability:     Unable to Pay for Housing in the Last Year: Not on file    Number of Places Lived in the Last Year: Not on file    Unstable Housing in the Last Year: Not on file        SURGICAL HISTORY  Past Surgical History:   Procedure Laterality Date    APPENDECTOMY      CARDIOVERSION      KNEE ARTHROSCOPY      right knee                 CURRENT MEDICATIONS  Current Outpatient Medications   Medication Sig Dispense Refill    amLODIPine (NORVASC) 5 MG tablet Take 1 tablet by mouth daily 90 tablet 1    hydroCHLOROthiazide (HYDRODIURIL) 25 MG tablet Take 1 tablet by mouth daily 90 tablet 1    losartan (COZAAR) 100 MG tablet Take 1 tablet by mouth daily 90 tablet 1    potassium chloride (KLOR-CON M20) 20 MEQ extended release tablet Take 1 tablet by mouth daily 90 tablet 1    warfarin (COUMADIN) 2.5 MG tablet Take 1 tablet by mouth See Admin Instructions 03/18/21 patient takes 2.5 mg every evening except on Mondays no therapy 90 tablet 1    UNABLE TO FIND Gold oil-help blood pressure.  azelastine (OPTIVAR) 0.05 % ophthalmic solution Place 1 drop into both eyes 2 times daily 1 each 5    Aspirin Buf,CaCarb-MgCarb-MgO, 81 MG TABS Take 1 tablet by mouth daily      fluticasone (FLONASE ALLERGY RELIEF) 50 MCG/ACT nasal spray 2 sprays by Each Nostril route daily      Omega-3 1000 MG CAPS Take 1 capsule by mouth daily      Turmeric 500 MG TABS Take 1 tablet by mouth daily      vitamin D-3 (CHOLECALCIFEROL) 125 MCG (5000 UT) TABS Take 5,000 capsules by mouth daily       sildenafil (VIAGRA) 100 MG tablet Take 1 tablet by mouth daily as needed for Erectile Dysfunction (Patient not taking: Reported on 5/16/2022) 30 tablet 1     No current facility-administered medications for this visit.        ALLERGIES  No Known Allergies    PHYSICAL EXAM    /76 (Site: Left Upper Arm, Position: Sitting, Cuff Size: Large Adult)   Pulse 61   Ht 6' 2.5\" (1.892 m)   Wt (!) 365 lb 1.6 oz (165.6 kg)   SpO2 93%   BMI 46.25 kg/m²     Physical Exam  Vitals and nursing note reviewed. Constitutional:       General: He is not in acute distress. Appearance: He is well-developed. He is obese. He is not ill-appearing or toxic-appearing. HENT:      Head: Normocephalic and atraumatic. Nose: Rhinorrhea present. Mouth/Throat:      Mouth: Mucous membranes are moist.      Pharynx: Oropharynx is clear. No oropharyngeal exudate. Eyes:      Pupils: Pupils are equal, round, and reactive to light. Cardiovascular:      Rate and Rhythm: Normal rate. Rhythm irregular. Heart sounds: Normal heart sounds. No murmur heard. No gallop. Pulmonary:      Effort: Pulmonary effort is normal. No respiratory distress. Breath sounds: Normal breath sounds. No wheezing, rhonchi or rales. Abdominal:      Palpations: Abdomen is soft. Musculoskeletal:         General: No swelling or deformity. Normal range of motion. Cervical back: Normal range of motion and neck supple. No rigidity. Skin:     General: Skin is warm and dry. Coloration: Skin is not jaundiced. Findings: No bruising. Neurological:      General: No focal deficit present. Mental Status: He is alert and oriented to person, place, and time. Mental status is at baseline. Cranial Nerves: No cranial nerve deficit. Motor: No weakness. Gait: Gait normal.   Psychiatric:         Mood and Affect: Mood normal.         Behavior: Behavior normal.         Thought Content: Thought content normal.         ASSESSMENT & PLAN     Diagnosis Orders   1. Cardiomyopathy, unspecified type ([address]     2. Essential hypertension  amLODIPine (NORVASC) 5 MG tablet    potassium chloride (KLOR-CON M20) 20 MEQ extended release tablet    Comprehensive Metabolic Panel    CBC   3.  Atrial fibrillation, unspecified type ([facility]  warfarin (COUMADIN) 2.5 MG tablet    POCT INR   4. Body mass index (BMI) 45.0-49.9, adult ([facility])     5. Class 3 severe obesity due to excess calories without serious comorbidity with body mass index (BMI) of 40.0 to 44.9 in adult ([facility]     6. Obstructive sleep apnea syndrome     7. Hyperlipidemia, unspecified hyperlipidemia type  LIPID PANEL   8. Idiopathic chronic gout of multiple sites without tophus     9. Allergic rhinitis, unspecified seasonality, unspecified trigger     10. Hyperglycemia  Hemoglobin A1C   Patient will continue on same dose of Coumadin and recheck INR 4 weeks. Advise he check a home COVID test because of congestion symptoms. Continue on usual regimen otherwise if positive he is to call this office. We will check A1c, CMP, CBC and lipid panel and have him follow-up for results. Continue with healthy lifestyle call with changes or problems. Multiple refills provided after med list reviewed. Return in about 6 months (around 11/16/2022).          Electronically signed by Blade Murguia MD on 5/16/2022

## 2022-05-17 ENCOUNTER — TELEMEDICINE (OUTPATIENT)
Dept: FAMILY MEDICINE CLINIC | Age: 72
End: 2022-05-17
Payer: MEDICARE

## 2022-05-17 DIAGNOSIS — Z00.00 INITIAL MEDICARE ANNUAL WELLNESS VISIT: Primary | ICD-10-CM

## 2022-05-17 PROCEDURE — G0438 PPPS, INITIAL VISIT: HCPCS | Performed by: FAMILY MEDICINE

## 2022-05-17 ASSESSMENT — PATIENT HEALTH QUESTIONNAIRE - PHQ9
SUM OF ALL RESPONSES TO PHQ QUESTIONS 1-9: 0
SUM OF ALL RESPONSES TO PHQ9 QUESTIONS 1 & 2: 0
SUM OF ALL RESPONSES TO PHQ QUESTIONS 1-9: 0
2. FEELING DOWN, DEPRESSED OR HOPELESS: 0
1. LITTLE INTEREST OR PLEASURE IN DOING THINGS: 0
SUM OF ALL RESPONSES TO PHQ QUESTIONS 1-9: 0
SUM OF ALL RESPONSES TO PHQ QUESTIONS 1-9: 0

## 2022-05-17 ASSESSMENT — LIFESTYLE VARIABLES: HOW OFTEN DO YOU HAVE A DRINK CONTAINING ALCOHOL: NEVER

## 2022-05-17 NOTE — PROGRESS NOTES
Medicare Annual Wellness Visit    Joan Ware is here for Medicare AWV    Assessment & Plan   Initial Medicare annual wellness visit      Recommendations for Preventive Services Due: see orders and patient instructions/AVS.  Recommended screening schedule for the next 5-10 years is provided to the patient in written form: see Patient Instructions/AVS.     No follow-ups on file. Subjective       Patient's complete Health Risk Assessment and screening values have been reviewed and are found in Flowsheets. The following problems were reviewed today and where indicated follow up appointments were made and/or referrals ordered.     Positive Risk Factor Screenings with Interventions:             General Health and ACP:  General  In general, how would you say your health is?: Good  In the past 7 days, have you experienced any of the following: New or Increased Pain, New or Increased Fatigue, Loneliness, Social Isolation, Stress or Anger?: No  Do you get the social and emotional support that you need?: Yes  Do you have a Living Will?: (!) No    Advance Directives     Power of  Living Will ACP-Advance Directive ACP-Power of     Not on File Not on File Not on File Not on File      General Health Risk Interventions:  · No Living Will: Advance Care Planning addressed with patient today    Health Habits/Nutrition:     Physical Activity: Insufficiently Active    Days of Exercise per Week: 3 days    Minutes of Exercise per Session: 40 min     Have you lost any weight without trying in the past 3 months?: No     Have you seen the dentist within the past year?: (!) No    Health Habits/Nutrition Interventions:  · Inadequate physical activity:  patient is not ready to increase his/her physical activity level at this time  · Nutritional issues:  patient states he is going back to his better eating habits prior to the pandemic and has returned to working out again  · Dental exam overdue:  patient encouraged to make appointment with his/her dentist    Hearing/Vision:  Do you or your family notice any trouble with your hearing that hasn't been managed with hearing aids?: (!) Yes  Do you have difficulty driving, watching TV, or doing any of your daily activities because of your eyesight?: No  Have you had an eye exam within the past year?: (!) No  No exam data present    Hearing/Vision Interventions:  · Hearing concerns:  patient declines any further evaluation/treatment for hearing issues  · Vision concerns:  patient encouraged to make appointment with his/her eye specialist    Safety:  Do you have working smoke detectors?: Yes  Do you have any tripping hazards - loose or unsecured carpets or rugs?: No  Do you have any tripping hazards - clutter in doorways, halls, or stairs?: No  Do you have either shower bars, grab bars, non-slip mats or non-slip surfaces in your shower or bathtub?: Yes  Do all of your stairways have a railing or banister?: Yes  Do you always fasten your seatbelt when you are in a car?: (!) No    Safety Interventions:  · Home safety tips provided verbally           Objective      Patient-Reported Vitals  No data recorded     Unable to obtain 3 vital signs due to patient not having equipment to take blood pressure/temperature. No Known Allergies  Prior to Visit Medications    Medication Sig Taking?  Authorizing Provider   amLODIPine (NORVASC) 5 MG tablet Take 1 tablet by mouth daily Yes Deven Cisse MD   hydroCHLOROthiazide (HYDRODIURIL) 25 MG tablet Take 1 tablet by mouth daily Yes Deven Cisse MD   losartan (COZAAR) 100 MG tablet Take 1 tablet by mouth daily Yes Deven Cisse MD   potassium chloride (KLOR-CON M20) 20 MEQ extended release tablet Take 1 tablet by mouth daily Yes Deven Cisse MD   warfarin (COUMADIN) 2.5 MG tablet Take 1 tablet by mouth See Admin Instructions 03/18/21 patient takes 2.5 mg every evening except on Mondays no therapy Yes Harvinder Callahan Shemar Lopez MD   UNABLE TO FIND Gold oil-help blood pressure. Yes Historical Provider, MD   azelastine (OPTIVAR) 0.05 % ophthalmic solution Place 1 drop into both eyes 2 times daily Yes Ivan Otoole MD   Aspirin Buf,CaCarb-MgCarb-MgO, 81 MG TABS Take 1 tablet by mouth daily Yes Historical Provider, MD   fluticasone (FLONASE ALLERGY RELIEF) 50 MCG/ACT nasal spray 2 sprays by Each Nostril route daily Yes Historical Provider, MD   [place]-3 1000 MG CAPS Take 1 capsule by mouth daily Yes Historical Provider, MD   Turmeric 500 MG TABS Take 1 tablet by mouth daily Yes Historical Provider, MD   vitamin D-3 (CHOLECALCIFEROL) 125 MCG (5000 UT) TABS Take 5,000 capsules by mouth daily  Yes Historical Provider, MD   sildenafil (VIAGRA) 100 MG tablet Take 1 tablet by mouth daily as needed for Erectile Dysfunction  Patient not taking: Reported on 5/16/2022  Ivan Otoole MD       [facility] (Including outside providers/suppliers regularly involved in providing care):   Patient Care Team:  Ivan Otoole MD as PCP - General  Ivan Otoole MD as PCP - [facility] Empaneled Provider     Reviewed and updated this visit:  Tobacco  Allergies  Meds  Med Hx  Surg Hx  Soc Hx  Fam Hx           Ernestina Punter, was evaluated through a synchronous (real-time) audio-video encounter. The patient (or guardian if applicable) is aware that this is a billable service, which includes applicable co-pays. This Virtual Visit was conducted with patient's (and/or legal guardian's) consent. The visit was conducted pursuant to the emergency declaration under the [facility], [address] [place] [address] [facility] authority and the [employer] and sim[employer]ar General Act. Patient identification was verified, and a caregiver was present when appropriate. The patient was located at home in a state where the provider was licensed to provide care.     Doris Jarrett LPN, 1/69/9234, performed the documented evaluation under the direct supervision of the attending physician. Joan Ware, was evaluated through a synchronous (real-time) audio encounter. The patient (or guardian if applicable) is aware that this is a billable service, which includes applicable co-pays. This Virtual Visit was conducted with patient's (and/or legal guardian's) consent. The visit was conducted pursuant to the emergency declaration under the [address] [place] [address] and the [place] [employer] General Act. Patient identification was verified, and a caregiver was present when appropriate. The patient was located at home in a state where the provider was licensed to provide care. Total time spent for this encounter: Not billed by time    --Elzie Holter, LPN on 5/04/2950 at 53:65 AM    An electronic signature was used to authenticate this note. This encounter was performed under oSila nguyen MDs, direct supervision, 5/17/2022.

## 2022-05-17 NOTE — PATIENT INSTRUCTIONS
Personalized Preventive Plan for Liv Quiroz - 5/17/2022  Medicare offers a range of preventive health benefits. Some of the tests and screenings are paid in full while other may be subject to a deductible, co-insurance, and/or copay. Some of these benefits include a comprehensive review of your medical history including lifestyle, illnesses that may run in your family, and various assessments and screenings as appropriate. After reviewing your medical record and screening and assessments performed today your provider may have ordered immunizations, labs, imaging, and/or referrals for you. A list of these orders (if applicable) as well as your Preventive Care list are included within your After Visit Summary for your review. Other Preventive Recommendations:    · A preventive eye exam performed by an eye specialist is recommended every 1-2 years to screen for glaucoma; cataracts, macular degeneration, and other eye disorders. · A preventive dental visit is recommended every 6 months. · Try to get at least 150 minutes of exercise per week or 10,000 steps per day on a pedometer . · Order or download the FREE \"Exercise & Physical Activity: Your Everyday Guide\" from The [employer]. Call 8-503.662.8645 or search The [employer] online. · You need 8915-3549 mg of calcium and 2136-2440 IU of vitamin D per day. It is possible to meet your calcium requirement with diet alone, but a vitamin D supplement is usually necessary to meet this goal.  · When exposed to the sun, use a sunscreen that protects against both UVA and UVB radiation with an SPF of 30 or greater. Reapply every 2 to 3 hours or after sweating, drying off with a towel, or swimming. · Always wear a seat belt when traveling in a car. Always wear a helmet when riding a bicycle or motorcycle.

## 2022-05-18 LAB
ESTIMATED AVERAGE GLUCOSE: 139.9 MG/DL
HBA1C MFR BLD: 6.5 %

## 2022-05-31 ENCOUNTER — OFFICE VISIT (OUTPATIENT)
Dept: FAMILY MEDICINE CLINIC | Age: 72
End: 2022-05-31
Payer: MEDICARE

## 2022-05-31 VITALS
WEIGHT: 315 LBS | HEART RATE: 74 BPM | OXYGEN SATURATION: 97 % | SYSTOLIC BLOOD PRESSURE: 122 MMHG | DIASTOLIC BLOOD PRESSURE: 78 MMHG | BODY MASS INDEX: 40.43 KG/M2 | TEMPERATURE: 97.4 F | HEIGHT: 74 IN

## 2022-05-31 DIAGNOSIS — K14.3 TONGUE COATING: Primary | ICD-10-CM

## 2022-05-31 PROCEDURE — 1123F ACP DISCUSS/DSCN MKR DOCD: CPT | Performed by: PHYSICIAN ASSISTANT

## 2022-05-31 PROCEDURE — 99213 OFFICE O/P EST LOW 20 MIN: CPT | Performed by: PHYSICIAN ASSISTANT

## 2022-05-31 RX ORDER — FLUCONAZOLE 150 MG/1
TABLET ORAL
Qty: 3 TABLET | Refills: 0 | Status: SHIPPED | OUTPATIENT
Start: 2022-05-31

## 2022-05-31 NOTE — PATIENT INSTRUCTIONS
Patient Education        Candidiasis: Care Instructions  Your Care Instructions  Candidiasis (say \"myw-ibg-XH-uh-daniela\") is a yeast infection. Yeast normally lives in your body. But it can cause problems if your body's defenses don'twork as they should. Some medicines can increase your chance of getting a yeast infection. These include antibiotics, steroids, and cancer drugs. And some diseases like AIDSand diabetes can make you more likely to get yeast infections. There are different types of yeast infections. i[place]gela [place] is a yeast infection in the mouth. It usually occurs in people with weakimmune systems. It causes white patches inside the mouth and throat. Yeast infections of the skin usually occur in skin folds where the skin stays moist. They cause red, oozing patches on your skin. Babies can get these infections under the diaper. Peoplewho often wear gloves can get them on their hands. Many women get vaginal yeast infections. They are most common when women take antibiotics. These infections can cause the vagina to itch and burn. They also cause white discharge that looks likecottage cheese. In rare cases, yeast infects the blood. This can cause serious disease. This kind of infection is treated with medicine given through a needle into a vein(IV). After you start treatment, a yeast infection usually goes away quickly. But if your immune system is weak, the infection may come back. Tell your doctor ifyou get yeast infections often. Follow-up care is a key part of your treatment and safety. Be sure to make and go to all appointments, and call your doctor if you are having problems. It's also a good idea to know your test results and keep alist of the medicines you take. How can you care for yourself at home?  Take your medicines exactly as prescribed. Call your doctor if you think you are having a problem with your medicine.  Use antibiotics only as directed by your doctor.    Eat yogurt with live cultures. It has bacteria called lactobacillus. It may help prevent some types of yeast infections.  Keep your skin clean and dry. Put powder on moist places.  If you are using a cream or suppository to treat a vaginal yeast infection, don't use condoms or a diaphragm. Use a different type of birth control.  Eat a healthy diet and get regular exercise. This will help keep your immune system strong. When should you call for help? Watch closely for changes in your health, and be sure to contact your doctor if:     You do not get better as expected. Where can you learn more? Go to [email or web address]://[email or web address] [email or web address].[email or web address]. org and sign in to your [employer] account. Enter E203 in the [facility] box to learn more about \"Candidiasis: Care Instructions. \"     If you do not have an account, please click on the \"Sign Up Now\" link. Current as of: November 22, 2021               Content Version: 13.2  © 3515-0067 Healthwise, [facility]. Care instructions adapted under license by [facility] ([facility]). If you have questions about a medical condition or this instruction, always ask your healthcare professional. [place] [address] any warranty or liability for your use of this information.

## 2022-05-31 NOTE — PROGRESS NOTES
5/31/2022    Keith Alexandre    Chief Complaint   Patient presents with   [place]     Patient states he thinks he has thrush. White coating on tongue x 3 weeks. He states he can't taste anything. His PCP advised him to take a at home covid test and it was negative. His mouth continues to bother him and he states he has had a sore throat. HPI  History was obtained from patient. Sandrine Nickerson is a 73 y.o. male who presents today with concerns for oral thrush. He states that he has had a white coating on his tongue for approximately 3 weeks. His wife had nystatin solution at home, so he took that for a few days. He did note improvement in the tongue coating while on the medication, but then it later reappeared. He has felt throat/esophageal irritation. He states prior to this developing, he was taking Mucinex for what he thought was bronchitis. He took a rapid COVID test at that time which was negative. He denies recent steroid or antibiotic use. No history of GERD. He denies heartburn, throat clearing, or cough. He denies fevers. He denies upset stomach, nausea, vomiting, melena or hematochezia.       PAST MEDICAL HISTORY  Past Medical History:   Diagnosis Date    Allergic rhinitis 5/24/2019    Atrial fibrillation ([address]     Cancer ([address]     skin    Cardiomyopathy ([address] 5/24/2019    Hilar lymphadenopathy 5/24/2019    calcified    Hyperlipidemia 5/24/2019    Hypertension     Impotence 5/24/2019    Obstructive sleep apnea syndrome 5/24/2019    Peripheral venous insufficiency 5/24/2019    Sleep apnea        FAMILY HISTORY  Family History   Problem Relation Age of Onset    Arthritis Mother     Dementia Mother     Stroke Mother         TIAs    High Blood Pressure Father     Clotting Disorder Father     Other Father         aneurysym    Diabetes Sister     Gait Disorder Brother     High Blood Pressure Brother     Other Sister         aneurysym    Kidney Disease Sister     Cancer Brother         melanoma       SOCIAL HISTORY  Social History     Socioeconomic History    Marital status:      Spouse name: None    Number of children: None    Years of education: None    Highest education level: None   Occupational History    None   Tobacco Use    Smoking status: Never Smoker    Smokeless tobacco: Never Used   Vaping Use    Vaping Use: Never used   Substance and Sexual Activity    Alcohol use: No    Drug use: No    Sexual activity: None   Other Topics Concern    None   Social History Narrative    None     Social Determinants of Health     Financial Resource Strain: Low Risk     Difficulty of Paying Living Expenses: Not hard at all   Food Insecurity: No Food Insecurity    Worried About Running Out of Food in the Last Year: Never true    Jadyn of Food in the Last Year: Never true   Transportation Needs:     Lack of Transportation (Medical): Not on file    Lack of Transportation (Non-Medical):  Not on file   Physical Activity: Insufficiently Active    Days of Exercise per Week: 3 days    Minutes of Exercise per Session: 40 min   Stress:     Feeling of Stress : Not on file   Social Connections:     Frequency of Communication with Friends and Family: Not on file    Frequency of Social Gatherings with Friends and Family: Not on file    Attends Zoroastrian Services: Not on file    Active Member of Clubs or Organizations: Not on file    Attends Club or Organization Meetings: Not on file    Marital Status: Not on file   Intimate Partner Violence:     Fear of Current or Ex-Partner: Not on file    Emotionally Abused: Not on file    Physically Abused: Not on file    Sexually Abused: Not on file   Housing Stability:     Unable to Pay for Housing in the Last Year: Not on file    Number of [place] in the Last Year: Not on file    Unstable Housing in the Last Year: Not on file        SURGICAL HISTORY  Past Surgical History:   Procedure Laterality Date    APPENDECTOMY  CARDIOVERSION      KNEE ARTHROSCOPY      right knee       CURRENT MEDICATIONS  Current Outpatient Medications   Medication Sig Dispense Refill    nystatin (MYCOSTATIN) 248900 UNIT/ML suspension Take 5 mLs by mouth 4 times daily for 10 days Swish, gargle, and swallow 5 mL po four times daily for 10 days 200 mL 0    fluconazole (DIFLUCAN) 150 MG tablet Take one tablet po every 3 days 3 tablet 0    amLODIPine (NORVASC) 5 MG tablet Take 1 tablet by mouth daily 90 tablet 1    hydroCHLOROthiazide (HYDRODIURIL) 25 MG tablet Take 1 tablet by mouth daily 90 tablet 1    losartan (COZAAR) 100 MG tablet Take 1 tablet by mouth daily 90 tablet 1    potassium chloride (KLOR-CON M20) 20 MEQ extended release tablet Take 1 tablet by mouth daily 90 tablet 1    warfarin (COUMADIN) 2.5 MG tablet Take 1 tablet by mouth See Admin Instructions 03/18/21 patient takes 2.5 mg every evening except on Mondays no therapy 90 tablet 1    azelastine (OPTIVAR) 0.05 % ophthalmic solution Place 1 drop into both eyes 2 times daily 1 each 5    Aspirin Buf,CaCarb-MgCarb-MgO, 81 MG TABS Take 1 tablet by mouth daily      fluticasone (FLONASE ALLERGY RELIEF) 50 MCG/ACT nasal spray 2 sprays by Each Nostril route daily      Omega-3 1000 MG CAPS Take 1 capsule by mouth daily      Turmeric 500 MG TABS Take 1 tablet by mouth daily      vitamin D-3 (CHOLECALCIFEROL) 125 MCG (5000 UT) TABS Take 5,000 capsules by mouth daily       UNABLE TO FIND Gold oil-help blood pressure.  sildenafil (VIAGRA) 100 MG tablet Take 1 tablet by mouth daily as needed for Erectile Dysfunction (Patient not taking: Reported on 5/16/2022) 30 tablet 1     No current facility-administered medications for this visit.        ALLERGIES  No Known Allergies    PHYSICAL EXAM    /78 (Site: Right Upper Arm, Position: Sitting, Cuff Size: Large Adult)   Pulse 74   Temp 97.4 °F (36.3 °C) (Temporal)   Ht 6' 2\" (1.88 m)   Wt (!) 367 lb (166.5 kg)   SpO2 97%   BMI 47.12 kg/m²     Constitutional:  Well developed, well nourished  HENT:  Normocephalic, atraumatic, bilateral external ears normal, bilateral ear canals with soft cerumen but TMs fully visible without fluid or infection. Oropharynx moist.  No petechiae or exudate or oral lesions. Uvula midline and benign and airway patent. Thin white film noted on back of tongue. Buccal mucosa fine. Nose normal.  Eyes:  conjunctiva normal, no discharge, no scleral icterus  Neck:  No tenderness, supple  Lymphatic:  No lymphadenopathy noted  Cardiovascular:  Normal heart rate, normal rhythm, no murmurs, gallops or rubs  Thorax & Lungs:  Normal breath sounds, no respiratory distress, no wheezing, no rales, no rhonchi  Skin:  Warm, dry, no erythema, no rash  Neurologic:  Alert & oriented  Psychiatric:  Affect normal, mood normal    ASSESSMENT & PLAN    Lashay Butcher was seen today for thrush. Diagnoses and all orders for this visit:    Tongue coating  -     nystatin (MYCOSTATIN) 418058 UNIT/ML suspension; Take 5 mLs by mouth 4 times daily for 10 days Swish, gargle, and swallow 5 mL po four times daily for 10 days  -     fluconazole (DIFLUCAN) 150 MG tablet; Take one tablet po every 3 days       We will do a trial of Diflucan and nystatin swish and swallow. We discussed potential INR fluctuations with Diflucan and Coumadin. Patient voiced understanding and wishes to continue. If symptoms persist, would recommend referral to ENT. There are no discontinued medications. No follow-ups on file. Please note that this chart was generated using dragon dictation software. Although every effort was made to ensure the accuracy of this automated transcription, some errors in transcription may have occurred.     Electronically signed by Shelly Araiza PA-C on 5/31/2022

## 2022-06-08 ENCOUNTER — TELEMEDICINE (OUTPATIENT)
Dept: FAMILY MEDICINE CLINIC | Age: 72
End: 2022-06-08
Payer: MEDICARE

## 2022-06-08 ENCOUNTER — TELEPHONE (OUTPATIENT)
Dept: FAMILY MEDICINE CLINIC | Age: 72
End: 2022-06-08

## 2022-06-08 DIAGNOSIS — J02.0 ACUTE STREPTOCOCCAL PHARYNGITIS: Primary | ICD-10-CM

## 2022-06-08 PROCEDURE — 99214 OFFICE O/P EST MOD 30 MIN: CPT | Performed by: PHYSICIAN ASSISTANT

## 2022-06-08 PROCEDURE — 1123F ACP DISCUSS/DSCN MKR DOCD: CPT | Performed by: PHYSICIAN ASSISTANT

## 2022-06-08 RX ORDER — AZITHROMYCIN 250 MG/1
250 TABLET, FILM COATED ORAL SEE ADMIN INSTRUCTIONS
Qty: 6 TABLET | Refills: 0 | Status: SHIPPED | OUTPATIENT
Start: 2022-06-08 | End: 2022-06-13

## 2022-06-08 NOTE — PROGRESS NOTES
6/8/2022    Cedric [place]    Chief Complaint   Patient presents with    Pharyngitis     seen walk in last tuesday for possible thrush, started about 4 weeks ago, saw Dr. Ken Monge on 5/13 and said he has no taste on his tongue which he did go home and take an at home covid test which was negative, says the thrush is gone but his throat is still sore, says around this time of year he gets bronchitis and seasonal allergies which he is normally given a zpak, has tried salt water and apple vineger water which didn't help. HPI  History was obtained from pt. Bailey Martins is a 73 y.o. male with a PMHx as listed below who presents today for evaluation of a sore throat beginning 4 weeks ago. He is also having sinus drainage and pressure. Pt has sore throat but no coughing - feels like the soreness in down into his chest. He has been treated for thrush and the mouth is feeling better but not the throat. Is tolerating PO, covid negative, no fevers. Usually needs a zpak once a year to clear this kind of thing.      1. Acute streptococcal pharyngitis           REVIEW OF SYMPTOMS    Review of Systems    PAST MEDICAL HISTORY  Past Medical History:   Diagnosis Date    Allergic rhinitis 5/24/2019    Atrial fibrillation ([address]     Cancer ([address]     skin    Cardiomyopathy ([address] 5/24/2019    Hilar lymphadenopathy 5/24/2019    calcified    Hyperlipidemia 5/24/2019    Hypertension     Impotence 5/24/2019    Obstructive sleep apnea syndrome 5/24/2019    Peripheral venous insufficiency 5/24/2019    Sleep apnea        FAMILY HISTORY  Family History   Problem Relation Age of Onset    Arthritis Mother     Dementia Mother     Stroke Mother         TIAs    High Blood Pressure Father     Clotting Disorder Father     Other Father         aneurysym    Diabetes Sister     Gait Disorder Brother     High Blood Pressure Brother     Other Sister         aneurysym    Kidney Disease Sister     Cancer Brother         melanoma SOCIAL HISTORY  Social History     Socioeconomic History    Marital status:      Spouse name: Not on file    Number of children: Not on file    Years of education: Not on file    Highest education level: Not on file   Occupational History    Not on file   Tobacco Use    Smoking status: Never Smoker    Smokeless tobacco: Never Used   Vaping Use    Vaping Use: Never used   Substance and Sexual Activity    Alcohol use: No    Drug use: No    Sexual activity: Not on file   Other Topics Concern    Not on file   Social History Narrative    Not on file     Social Determinants of Health     Financial Resource Strain: Low Risk     Difficulty of Paying Living Expenses: Not hard at all   Food Insecurity: No Food Insecurity    Worried About [address] in the Last Year: Never true    [address] [employer] in the Last Year: Never true   Transportation Needs:     Lack of Transportation (Medical): Not on file    Lack of Transportation (Non-Medical):  Not on file   Physical Activity: Insufficiently Active    Days of Exercise per Week: 3 days    Minutes of Exercise per Session: 40 min   Stress:     Feeling of Stress : Not on file   Social Connections:     Frequency of Communication with Friends and Family: Not on file    Frequency of Social Gatherings with Friends and Family: Not on file    Attends Buddhism Services: Not on file    Active Member of Clubs or Organizations: Not on file    Attends Club or Organization Meetings: Not on file    Marital Status: Not on file   Intimate Partner Violence:     Fear of Current or Ex-Partner: Not on file    Emotionally Abused: Not on file    Physically Abused: Not on file    Sexually Abused: Not on file   Housing Stability:     Unable to Pay for Housing in the Last Year: Not on file    Number of [place] in the Last Year: Not on file    Unstable Housing in the Last Year: Not on file        SURGICAL HISTORY  Past Surgical History:   Procedure Laterality Date    APPENDECTOMY      CAPSULOTOMY, HAND      KNEE ARTHROSCOPY      right knee                 CURRENT MEDICATIONS  Current Outpatient Medications   Medication Sig Dispense Refill    azithromycin (ZITHROMAX) 250 MG tablet Take 1 tablet by mouth See Admin Instructions for 5 days 500mg on day 1 followed by 250mg on days 2 - 5 6 tablet 0    amLODIPine (NORVASC) 5 MG tablet Take 1 tablet by mouth daily 90 tablet 1    hydroCHLOROthiazide (HYDRODIURIL) 25 MG tablet Take 1 tablet by mouth daily 90 tablet 1    losartan (COZAAR) 100 MG tablet Take 1 tablet by mouth daily 90 tablet 1    potassium chloride (KLOR-CON M20) 20 MEQ extended release tablet Take 1 tablet by mouth daily 90 tablet 1    warfarin (COUMADIN) 2.5 MG tablet Take 1 tablet by mouth See Admin Instructions 03/18/21 patient takes 2.5 mg every evening except on Mondays no therapy 90 tablet 1    UNABLE TO FIND Gold oil-help blood pressure.       azelastine (OPTIVAR) 0.05 % ophthalmic solution Place 1 drop into both eyes 2 times daily 1 each 5    Aspirin Buf,CaCarb-MgCarb-MgO, 81 MG TABS Take 1 tablet by mouth daily      fluticasone (FLONASE ALLERGY RELIEF) 50 MCG/ACT nasal spray 2 sprays by Each Nostril route daily      Omega-3 1000 MG CAPS Take 1 capsule by mouth daily      Turmeric 500 MG TABS Take 1 tablet by mouth daily      vitamin D-3 (CHOLECALCIFEROL) 125 MCG (5000 UT) TABS Take 5,000 capsules by mouth daily       nystatin (MYCOSTATIN) 268314 UNIT/ML suspension Take 5 mLs by mouth 4 times daily for 10 days Swish, gargle, and swallow 5 mL po four times daily for 10 days (Patient not taking: Reported on 6/8/2022) 200 mL 0    fluconazole (DIFLUCAN) 150 MG tablet Take one tablet po every 3 days (Patient not taking: Reported on 6/8/2022) 3 tablet 0    sildenafil (VIAGRA) 100 MG tablet Take 1 tablet by mouth daily as needed for Erectile Dysfunction (Patient not taking: Reported on 6/8/2022) 30 tablet 1     No current facility-administered medications for this visit. ALLERGIES  No Known Allergies    PHYSICAL EXAM    There were no vitals taken for this visit. Physical Exam  Constitutional:       General: He is not in acute distress. Appearance: Normal appearance. He is obese. He is not ill-appearing, toxic-appearing or diaphoretic. HENT:      Head: Normocephalic and atraumatic. Nose: Nose normal.   Eyes:      General: No scleral icterus. Right eye: No discharge. Left eye: No discharge. Extraocular Movements: Extraocular movements intact. Conjunctiva/sclera: Conjunctivae normal.      Pupils: Pupils are equal, round, and reactive to light. Neurological:      General: No focal deficit present. Mental Status: He is alert and oriented to person, place, and time. Cranial Nerves: No cranial nerve deficit. Psychiatric:         Mood and Affect: Mood normal.         Behavior: Behavior normal.         Thought Content: Thought content normal.         Judgment: Judgment normal.         ASSESSMENT & PLAN    1. Acute streptococcal pharyngitis  Discussed symptomatic mgmt. May need ENT if sore throat persists despite abx therapy    - azithromycin (ZITHROMAX) 250 MG tablet; Take 1 tablet by mouth See Admin Instructions for 5 days 500mg on day 1 followed by 250mg on days 2 - 5  Dispense: 6 tablet; Refill: 0      Return if symptoms worsen or fail to improve. Electronically signed by Charlie Mobley on 6/8/2022    Cedric Segura, was evaluated through a synchronous (real-time) audio-video encounter. The patient (or guardian if applicable) is aware that this is a billable service. Verbal consent to proceed has been obtained within the past 12 months. The visit was conducted pursuant to the emergency declaration under the [facility], [address] [place] [address] authority and the [employer] General Act.   Patient identification was verified, and a caregiver was present when appropriate. The patient was located in a state where the provider was credentialed to provide care. Total time spent for this encounter: Not billed by time    --DOMO Deo on 6/8/2022 at 11:18 AM    An electronic signature was used to authenticate this note. Comment: Please note this report has been produced using speech recognition software and may contain errors related to that system including errors in grammar, punctuation, and spelling, as well as words and phrases that may be inappropriate. If there are any questions or concerns please feel free to contact the dictating provider for clarification.

## 2022-06-08 NOTE — TELEPHONE ENCOUNTER
To Dr. Katina Monge-    Patient went to the [facility] for possible Thrush----that has gone away but he still has a sore throat and wants to be seen in our office by you because he thinks is has seasonal allergies ----I do have a cancellation for tomorrow at 10:00. Note: Patient declined going back to the [facility].

## 2022-06-13 ENCOUNTER — APPOINTMENT (OUTPATIENT)
Dept: CT IMAGING | Age: 72
End: 2022-06-13
Payer: MEDICARE

## 2022-06-13 ENCOUNTER — HOSPITAL ENCOUNTER (EMERGENCY)
Age: 72
Discharge: HOME | End: 2022-06-13
Attending: EMERGENCY MEDICINE
Payer: MEDICARE

## 2022-06-13 ENCOUNTER — NURSE ONLY (OUTPATIENT)
Dept: FAMILY MEDICINE CLINIC | Age: 72
End: 2022-06-13
Payer: MEDICARE

## 2022-06-13 VITALS
TEMPERATURE: 98.5 F | OXYGEN SATURATION: 96 % | DIASTOLIC BLOOD PRESSURE: 84 MMHG | WEIGHT: 315 LBS | HEART RATE: 75 BPM | BODY MASS INDEX: 40.43 KG/M2 | HEIGHT: 74 IN | SYSTOLIC BLOOD PRESSURE: 130 MMHG | RESPIRATION RATE: 17 BRPM

## 2022-06-13 DIAGNOSIS — I48.91 ATRIAL FIBRILLATION, UNSPECIFIED TYPE (HCC): ICD-10-CM

## 2022-06-13 DIAGNOSIS — J02.9 ACUTE PHARYNGITIS, UNSPECIFIED ETIOLOGY: Primary | ICD-10-CM

## 2022-06-13 DIAGNOSIS — K14.8 TONGUE LESION: ICD-10-CM

## 2022-06-13 LAB
ALBUMIN SERPL-MCNC: 3.8 GM/DL (ref 3.4–5)
ALP BLD-CCNC: 54 IU/L (ref 40–129)
ALT SERPL-CCNC: 19 U/L (ref 10–40)
ANION GAP SERPL CALCULATED.3IONS-SCNC: 9 MMOL/L (ref 4–16)
AST SERPL-CCNC: 23 IU/L (ref 15–37)
BASOPHILS ABSOLUTE: 0 K/CU MM
BASOPHILS RELATIVE PERCENT: 0.5 % (ref 0–1)
BILIRUB SERPL-MCNC: 0.7 MG/DL (ref 0–1)
BUN BLDV-MCNC: 13 MG/DL (ref 6–23)
CALCIUM SERPL-MCNC: 8.8 MG/DL (ref 8.3–10.6)
CHLORIDE BLD-SCNC: 101 MMOL/L (ref 99–110)
CO2: 28 MMOL/L (ref 21–32)
CREAT SERPL-MCNC: 0.9 MG/DL (ref 0.9–1.3)
DIFFERENTIAL TYPE: ABNORMAL
EOSINOPHILS ABSOLUTE: 0.1 K/CU MM
EOSINOPHILS RELATIVE PERCENT: 1.5 % (ref 0–3)
GFR AFRICAN AMERICAN: >60 ML/MIN/1.73M2
GFR NON-AFRICAN AMERICAN: >60 ML/MIN/1.73M2
GLUCOSE BLD-MCNC: 158 MG/DL (ref 70–99)
HCT VFR BLD CALC: 48.2 % (ref 42–52)
HEMOGLOBIN: 16.2 GM/DL (ref 13.5–18)
HETEROPHILE ANTIBODIES: NEGATIVE
IMMATURE NEUTROPHIL %: 0.5 % (ref 0–0.43)
INTERNATIONAL NORMALIZATION RATIO, POC: 3.8
LYMPHOCYTES ABSOLUTE: 2.7 K/CU MM
LYMPHOCYTES RELATIVE PERCENT: 34 % (ref 24–44)
MCH RBC QN AUTO: 31.4 PG (ref 27–31)
MCHC RBC AUTO-ENTMCNC: 33.6 % (ref 32–36)
MCV RBC AUTO: 93.4 FL (ref 78–100)
MONOCYTES ABSOLUTE: 0.7 K/CU MM
MONOCYTES RELATIVE PERCENT: 8.8 % (ref 0–4)
PDW BLD-RTO: 13.1 % (ref 11.7–14.9)
PLATELET # BLD: 226 K/CU MM (ref 140–440)
PMV BLD AUTO: 9.5 FL (ref 7.5–11.1)
POTASSIUM SERPL-SCNC: 3.9 MMOL/L (ref 3.5–5.1)
PROTHROMBIN TIME, POC: 45.3
RAPID INFLUENZA  B AGN: NEGATIVE
RAPID INFLUENZA A AGN: NEGATIVE
RBC # BLD: 5.16 M/CU MM (ref 4.6–6.2)
SARS-COV-2, NAAT: NOT DETECTED
SEGMENTED NEUTROPHILS ABSOLUTE COUNT: 4.3 K/CU MM
SEGMENTED NEUTROPHILS RELATIVE PERCENT: 54.7 % (ref 36–66)
SODIUM BLD-SCNC: 138 MMOL/L (ref 135–145)
SOURCE: NORMAL
TOTAL IMMATURE NEUTOROPHIL: 0.04 K/CU MM
TOTAL PROTEIN: 6.6 GM/DL (ref 6.4–8.2)
WBC # BLD: 7.9 K/CU MM (ref 4–10.5)

## 2022-06-13 PROCEDURE — 85610 PROTHROMBIN TIME: CPT | Performed by: FAMILY MEDICINE

## 2022-06-13 PROCEDURE — 99284 EMERGENCY DEPT VISIT MOD MDM: CPT

## 2022-06-13 PROCEDURE — 87430 STREP A AG IA: CPT

## 2022-06-13 PROCEDURE — 80053 COMPREHEN METABOLIC PANEL: CPT

## 2022-06-13 PROCEDURE — 85025 COMPLETE CBC W/AUTO DIFF WBC: CPT

## 2022-06-13 PROCEDURE — 87804 INFLUENZA ASSAY W/OPTIC: CPT

## 2022-06-13 PROCEDURE — 86318 IA INFECTIOUS AGENT ANTIBODY: CPT

## 2022-06-13 PROCEDURE — 87081 CULTURE SCREEN ONLY: CPT

## 2022-06-13 PROCEDURE — 87635 SARS-COV-2 COVID-19 AMP PRB: CPT

## 2022-06-13 PROCEDURE — 70490 CT SOFT TISSUE NECK W/O DYE: CPT

## 2022-06-13 ASSESSMENT — ENCOUNTER SYMPTOMS
GASTROINTESTINAL NEGATIVE: 1
TROUBLE SWALLOWING: 0
RESPIRATORY NEGATIVE: 1
EYES NEGATIVE: 1
VOICE CHANGE: 0
SORE THROAT: 1
ALLERGIC/IMMUNOLOGIC NEGATIVE: 1

## 2022-06-13 ASSESSMENT — PAIN - FUNCTIONAL ASSESSMENT: PAIN_FUNCTIONAL_ASSESSMENT: NONE - DENIES PAIN

## 2022-06-13 NOTE — ED PROVIDER NOTES
[facility]      TRIAGE CHIEF COMPLAINT:   Pharyngitis      Ione:  Connor Tomas is a 73 y.o. male that presents with complaint of sore throat, lesion on his tongue. Patient states for the last 2 weeks to months he has had a sore throat, spot on his left back, he is seen his PCP and has a ENT appointment scheduled on the 27th of this month. He states he cannot wait any longer to be seen. He states he is worried about cancer. He denies history of oral cancer he has a history of melanoma. He denies any fever chest pain shortness of breath choking gagging drooling. He is eating drinking okay. Just complains of sore throat, redness and a spot on his left toenail not go away. He finished Diflucan as well as azithromycin but not gone away. No other questions. Denies any trauma. He is on Coumadin. REVIEW OF SYSTEMS:  At least 10 systems reviewed and otherwise acutely negative except as in the [address] Review of Systems   Constitutional: Negative. HENT: Positive for sore throat. Negative for trouble swallowing and voice change. Tongue lesion     Eyes: Negative. Respiratory: Negative. Cardiovascular: Negative. Gastrointestinal: Negative. Endocrine: Negative. Genitourinary: Negative. Musculoskeletal: Negative. Skin: Negative. Allergic/Immunologic: Negative. Neurological: Negative. Hematological: Negative. Psychiatric/Behavioral: Negative. All other systems reviewed and are negative.       Past Medical History:   Diagnosis Date    Allergic rhinitis 5/24/2019    Atrial fibrillation (HCC)     Cancer (HCC)     skin    Cardiomyopathy ([address] 5/24/2019    Hilar lymphadenopathy 5/24/2019    calcified    Hyperlipidemia 5/24/2019    Hypertension     Impotence 5/24/2019    Obstructive sleep apnea syndrome 5/24/2019    Peripheral venous insufficiency 5/24/2019    Sleep apnea      Past Surgical History:   Procedure Laterality Date    APPENDECTOMY      CARDIOVERSION      KNEE ARTHROSCOPY      right knee     Family History   Problem Relation Age of Onset    Arthritis Mother     Dementia Mother     Stroke Mother         TIAs    High Blood Pressure Father     Clotting Disorder Father     Other Father         aneurysym    Diabetes Sister     Gait Disorder Brother     High Blood Pressure Brother     Other Sister         aneurysym    Kidney Disease Sister     Cancer Brother         melanoma     Social History     Socioeconomic History    Marital status:      Spouse name: Not on file    Number of children: Not on file    Years of education: Not on file    Highest education level: Not on file   Occupational History    Not on file   Tobacco Use    Smoking status: Never Smoker    Smokeless tobacco: Never Used   Vaping Use    Vaping Use: Never used   Substance and Sexual Activity    Alcohol use: No    Drug use: No    Sexual activity: Not on file   Other Topics Concern    Not on file   Social History Narrative    Not on file     Social Determinants of Health     Financial Resource Strain: Low Risk     Difficulty of Paying Living Expenses: Not hard at all   Food Insecurity: No Food Insecurity    Worried About Running Out of Food in the Last Year: Never true    Jadyn of Food in the Last Year: Never true   Transportation Needs:     Lack of Transportation (Medical): Not on file    Lack of Transportation (Non-Medical):  Not on file   Physical Activity: Insufficiently Active    Days of Exercise per Week: 3 days    Minutes of Exercise per Session: 40 min   Stress:     Feeling of Stress : Not on file   Social Connections:     Frequency of Communication with Friends and Family: Not on file    Frequency of Social Gatherings with Friends and Family: Not on file    Attends Yazdanism Services: Not on file    Active Member of Clubs or Organizations: Not on file    Attends Club or Organization Meetings: Not on file   Gayatri Marital Status: Not on file   Intimate Partner Violence:     Fear of Current or Ex-Partner: Not on file    Emotionally Abused: Not on file    Physically Abused: Not on file    Sexually Abused: Not on file   Housing Stability:     Unable to Pay for Housing in the Last Year: Not on file    Number of [place] in the Last Year: Not on file    Unstable Housing in the Last Year: Not on file     No current facility-administered medications for this encounter. Current Outpatient Medications   Medication Sig Dispense Refill    azithromycin (ZITHROMAX) 250 MG tablet Take 1 tablet by mouth See Admin Instructions for 5 days 500mg on day 1 followed by 250mg on days 2 - 5 6 tablet 0    fluconazole (DIFLUCAN) 150 MG tablet Take one tablet po every 3 days (Patient not taking: Reported on 6/8/2022) 3 tablet 0    amLODIPine (NORVASC) 5 MG tablet Take 1 tablet by mouth daily 90 tablet 1    hydroCHLOROthiazide (HYDRODIURIL) 25 MG tablet Take 1 tablet by mouth daily 90 tablet 1    losartan (COZAAR) 100 MG tablet Take 1 tablet by mouth daily 90 tablet 1    potassium chloride (KLOR-CON M20) 20 MEQ extended release tablet Take 1 tablet by mouth daily 90 tablet 1    warfarin (COUMADIN) 2.5 MG tablet Take 1 tablet by mouth See Admin Instructions 03/18/21 patient takes 2.5 mg every evening except on Mondays no therapy 90 tablet 1    UNABLE TO FIND Gold oil-help blood pressure.       azelastine (OPTIVAR) 0.05 % ophthalmic solution Place 1 drop into both eyes 2 times daily 1 each 5    Aspirin Buf,CaCarb-MgCarb-MgO, 81 MG TABS Take 1 tablet by mouth daily      sildenafil (VIAGRA) 100 MG tablet Take 1 tablet by mouth daily as needed for Erectile Dysfunction (Patient not taking: Reported on 6/8/2022) 30 tablet 1    fluticasone (FLONASE ALLERGY RELIEF) 50 MCG/ACT nasal spray 2 sprays by Each Nostril route daily      Omega-3 1000 MG CAPS Take 1 capsule by mouth daily      Turmeric 500 MG TABS Take 1 tablet by mouth daily  vitamin D-3 (CHOLECALCIFEROL) 125 MCG (5000 UT) TABS Take 5,000 capsules by mouth daily         No Known Allergies  No current facility-administered medications for this encounter. Current Outpatient Medications   Medication Sig Dispense Refill    azithromycin (ZITHROMAX) 250 MG tablet Take 1 tablet by mouth See Admin Instructions for 5 days 500mg on day 1 followed by 250mg on days 2 - 5 6 tablet 0    fluconazole (DIFLUCAN) 150 MG tablet Take one tablet po every 3 days (Patient not taking: Reported on 6/8/2022) 3 tablet 0    amLODIPine (NORVASC) 5 MG tablet Take 1 tablet by mouth daily 90 tablet 1    hydroCHLOROthiazide (HYDRODIURIL) 25 MG tablet Take 1 tablet by mouth daily 90 tablet 1    losartan (COZAAR) 100 MG tablet Take 1 tablet by mouth daily 90 tablet 1    potassium chloride (KLOR-CON M20) 20 MEQ extended release tablet Take 1 tablet by mouth daily 90 tablet 1    warfarin (COUMADIN) 2.5 MG tablet Take 1 tablet by mouth See Admin Instructions 03/18/21 patient takes 2.5 mg every evening except on Mondays no therapy 90 tablet 1    UNABLE TO FIND Gold oil-help blood pressure.       azelastine (OPTIVAR) 0.05 % ophthalmic solution Place 1 drop into both eyes 2 times daily 1 each 5    Aspirin Buf,CaCarb-MgCarb-MgO, 81 MG TABS Take 1 tablet by mouth daily      sildenafil (VIAGRA) 100 MG tablet Take 1 tablet by mouth daily as needed for Erectile Dysfunction (Patient not taking: Reported on 6/8/2022) 30 tablet 1    fluticasone (FLONASE ALLERGY RELIEF) 50 MCG/ACT nasal spray 2 sprays by Each Nostril route daily      Omega-3 1000 MG CAPS Take 1 capsule by mouth daily      Turmeric 500 MG TABS Take 1 tablet by mouth daily      vitamin D-3 (CHOLECALCIFEROL) 125 MCG (5000 UT) TABS Take 5,000 capsules by mouth daily          Nursing Notes Reviewed    VITAL SIGNS:  ED Triage Vitals   Enc Vitals Group      BP       Pulse       Resp       Temp       Temp src       SpO2       Weight       Height Head Circumference       Peak Flow       Pain Score       Pain Loc       Pain Edu? Excl. in [address] PHYSICAL EXAM:  Physical Exam  Vitals and nursing note reviewed. Constitutional:       General: He is not in acute distress. Appearance: Normal appearance. He is well-developed and well-groomed. He is obese. He is not ill-appearing, toxic-appearing or diaphoretic. HENT:      Head: Normocephalic and atraumatic. Right Ear: External ear normal.      Left Ear: External ear normal.      Nose: No congestion or rhinorrhea. Mouth/Throat:      Mouth: Mucous membranes are moist. No angioedema. Tongue: Lesions present. Tongue does not deviate from midline. Pharynx: Oropharynx is clear. Uvula midline. Posterior oropharyngeal erythema present. No pharyngeal swelling, oropharyngeal exudate or uvula swelling. Tonsils: No tonsillar exudate or tonsillar abscesses. 0 on the right. 0 on the left. Eyes:      General: No scleral icterus. Right eye: No discharge. Left eye: No discharge. Extraocular Movements: Extraocular movements intact. Conjunctiva/sclera: Conjunctivae normal.      Pupils: Pupils are equal, round, and reactive to light. Neck:      Vascular: No carotid bruit or JVD. Trachea: Phonation normal.      Meningeal: Brudzinski's sign absent. Cardiovascular:      Rate and Rhythm: Normal rate and regular rhythm. Pulses: Normal pulses. Heart sounds: Normal heart sounds. No murmur heard. No friction rub. No gallop. Pulmonary:      Effort: Pulmonary effort is normal. No respiratory distress. Breath sounds: Normal breath sounds. No stridor. No wheezing, rhonchi or rales. Abdominal:      General: Bowel sounds are normal. There is no distension. Palpations: Abdomen is soft. There is no mass. Tenderness: There is no abdominal tenderness. There is no guarding or rebound. Hernia: No hernia is present.    Musculoskeletal: General: No swelling, tenderness, deformity or signs of injury. Normal range of motion. Cervical back: Full passive range of motion without pain and normal range of motion. No edema, erythema, signs of trauma, rigidity, torticollis or crepitus. No pain with movement. Normal range of motion. Right lower leg: No edema. Left lower leg: No edema. Skin:     General: Skin is warm. Coloration: Skin is not jaundiced or pale. Findings: No bruising, erythema, lesion or rash. Neurological:      General: No focal deficit present. Mental Status: He is alert and oriented to person, place, and time. Cranial Nerves: No cranial nerve deficit. Sensory: No sensory deficit. Motor: No weakness. Coordination: Coordination normal.   Psychiatric:         Mood and Affect: Mood normal.         Behavior: Behavior normal. Behavior is cooperative. Thought Content:  Thought content normal.         Judgment: Judgment normal.           I have reviewed andinterpreted all of the currently available lab results from this visit (if applicable):    Results for orders placed or performed during the hospital encounter of 06/13/22   Rapid Flu Swab    Specimen: Nasopharyngeal   Result Value Ref Range    Rapid Influenza A Ag NEGATIVE NEGATIVE    Rapid Influenza B Ag NEGATIVE NEGATIVE   Strep Screen Group A Throat    Specimen: Throat   Result Value Ref Range    Specimen THROAT     Special Requests NONE     Strep A Direct Screen NEGATIVE    COVID-19, Rapid    Specimen: Nasopharyngeal   Result Value Ref Range    Source THROAT     SARS-CoV-2, NAAT NOT DETECTED NOT DETECTED   Mononucleosis Screen   Result Value Ref Range    Monospot NEGATIVE NEGATIVE   CBC with Auto Differential   Result Value Ref Range    WBC 7.9 4.0 - 10.5 K/CU MM    RBC 5.16 4.6 - 6.2 M/CU MM    Hemoglobin 16.2 13.5 - 18.0 GM/DL    Hematocrit 48.2 42 - 52 %    MCV 93.4 78 - 100 FL    MCH 31.4 (H) 27 - 31 PG    MCHC 33.6 32.0 - 36.0 %    RDW 13.1 11.7 - 14.9 %    Platelets 264 510 - 477 K/CU MM    MPV 9.5 7.5 - 11.1 FL    Differential Type AUTOMATED DIFFERENTIAL     Segs Relative 54.7 36 - 66 %    Lymphocytes % 34.0 24 - 44 %    Monocytes % 8.8 (H) 0 - 4 %    Eosinophils % 1.5 0 - 3 %    Basophils % 0.5 0 - 1 %    Segs Absolute 4.3 K/CU MM    Lymphocytes Absolute 2.7 K/CU MM    Monocytes Absolute 0.7 K/CU MM    Eosinophils Absolute 0.1 K/CU MM    Basophils Absolute 0.0 K/CU MM    Immature Neutrophil % 0.5 (H) 0 - 0.43 %    Total Immature Neutrophil 0.04 K/CU MM   Comprehensive Metabolic Panel   Result Value Ref Range    Sodium 138 135 - 145 MMOL/L    Potassium 3.9 3.5 - 5.1 MMOL/L    Chloride 101 99 - 110 mMol/L    CO2 28 21 - 32 MMOL/L    BUN 13 6 - 23 MG/DL    CREATININE 0.9 0.9 - 1.3 MG/DL    Glucose 158 (H) 70 - 99 MG/DL    Calcium 8.8 8.3 - 10.6 MG/DL    Albumin 3.8 3.4 - 5.0 GM/DL    Total Protein 6.6 6.4 - 8.2 GM/DL    Total Bilirubin 0.7 0.0 - 1.0 MG/DL    ALT 19 10 - 40 U/L    AST 23 15 - 37 IU/L    Alkaline Phosphatase 54 40 - 129 IU/L    GFR Non-African American >60 >60 mL/min/1.73m2    GFR African American >60 >60 mL/min/1.73m2    Anion Gap 9 4 - 16        Radiographs (if obtained):  [] The following radiograph was interpreted by myself in the absence of a radiologist:  [x] Radiologist's Report Reviewed:    CT soft tissue neck    EKG (if obtained): (All EKG's are interpreted by myself in the absence of a cardiologist)    MDM:    Patient with complaint of sore throat, tongue lesion he is worried about cancer. Again he states for the past month to 2 weeks. Patient states that he has seen his PCP he finished a treatment of Diflucan, nystatin, azithromycin and I getting better. He has an appointment scheduled with ENT in 2 weeks but he cannot waiting longer. States for the last 2 weeks a month has had a white spot on his left back tongue that does not go away. Also sore throat.   Denies any fevers nausea vomiting chest pain shortness of breath cough trauma. He is on Coumadin. No bleeding. No trouble swallowing eating drinking normally. He has no history of cancer except for melanoma which was taken off. He denies other questions or concerns. On arrival he otherwise appears well he has no stridor no drooling no tonsillar exudate or swelling he does have erythema to his pharynx he has no carotid bruits no cervical adenopathy. He does have a lesion to his lower back left tongue is white, about pea-sized in nature no bleeding it does not scrape off and is not tender. I did explain that he does need to follow-up with ENT I can do imaging not sure if it is emergent or not doubtful but he would like imaging performed and lab work. Mono strep flu COVID all negative. Imaging and will discharge home with ENT follow-up. He denies history of smoking, dipping, tobacco use. Patient recheck doing well labs imaging all negative. No definite tongue lesion on CT scan or extension of masses etc.  Patient stable discharged home with ENT follow-up given return precautions stable.       CLINICAL IMPRESSION:  Final diagnoses:   Acute pharyngitis, unspecified etiology   Tongue lesion       (Please note that portions of this note may have been completed with a voice recognition program. Efforts were made to edit the dictations but occasionally words aremis-transcribed.)    DISPOSITION REFERRAL (if applicable):  Gal Meyer MD  [address] [place] [address]  [phone]    Schedule an appointment as soon as possible for a visit in 1 day      [address]  [phone]    If symptoms worsen    Clayton Francis MD  [address] [place] [address]  [phone]    Schedule an appointment as soon as possible for a visit in 1 day  ENT      DISPOSITION MEDICATIONS (if applicable):  New Prescriptions    No medications on file          Lux Najera DO Maren Christine,   06/13/22 1605

## 2022-06-13 NOTE — PROGRESS NOTES
Confirmed current coumadin 2.5mg daily x 6day and 7th day 0 mg. INR 3.8 patient just took his last dose of azithromycin yesterday, per Dr. Ritchie Dress hold today's dose keep dose the same and recheck in 2 weeks.

## 2022-06-13 NOTE — ED TRIAGE NOTES
Pt arrived via triage with c/o sore throat x1 month. Pt also reports a spot on his tongue for some time. Pt reports trying antifungal and antibiotics. Pt is alert, oriented and ambulatory. Pt respirations are even and unlabored.

## 2022-06-13 NOTE — ED NOTES
Covid swab collected, labeled and walked to lab for processing. Flu swab collected, labeled and walked to lab for processing. Strep swab collected, labeled and walked to lab for processing.      Spring Gomes RN  06/13/22 0255

## 2022-06-16 LAB
CULTURE: NORMAL
Lab: NORMAL
SPECIMEN: NORMAL
STREP A DIRECT SCREEN: NEGATIVE

## 2022-06-27 ENCOUNTER — NURSE ONLY (OUTPATIENT)
Dept: FAMILY MEDICINE CLINIC | Age: 72
End: 2022-06-27
Payer: MEDICARE

## 2022-06-27 DIAGNOSIS — I48.91 ATRIAL FIBRILLATION, UNSPECIFIED TYPE (HCC): ICD-10-CM

## 2022-06-27 LAB
INTERNATIONAL NORMALIZATION RATIO, POC: 2.4
PROTHROMBIN TIME, POC: 28.3

## 2022-06-27 PROCEDURE — 85610 PROTHROMBIN TIME: CPT | Performed by: FAMILY MEDICINE

## 2022-06-27 NOTE — PROGRESS NOTES
Confirmed current coumadin 2.5mg daily x 6day and 7th day 0 mg. INR 2.4 keep dose the same and recheck in 4 weeks.

## 2022-07-25 ENCOUNTER — NURSE ONLY (OUTPATIENT)
Dept: FAMILY MEDICINE CLINIC | Age: 72
End: 2022-07-25
Payer: MEDICARE

## 2022-07-25 DIAGNOSIS — I48.91 ATRIAL FIBRILLATION, UNSPECIFIED TYPE (HCC): ICD-10-CM

## 2022-07-25 LAB
INTERNATIONAL NORMALIZATION RATIO, POC: 2.8
PROTHROMBIN TIME, POC: 33.6

## 2022-07-25 PROCEDURE — 85610 PROTHROMBIN TIME: CPT | Performed by: FAMILY MEDICINE

## 2022-07-25 NOTE — PROGRESS NOTES
Confirmed current coumadin 2.5mg daily x 6day and 7th day 0 mg. INR 2.8 keep dose the same and recheck in 4 weeks.

## 2022-08-22 ENCOUNTER — NURSE ONLY (OUTPATIENT)
Dept: FAMILY MEDICINE CLINIC | Age: 72
End: 2022-08-22
Payer: MEDICARE

## 2022-08-22 ENCOUNTER — ANTI-COAG VISIT (OUTPATIENT)
Dept: FAMILY MEDICINE CLINIC | Age: 72
End: 2022-08-22

## 2022-08-22 DIAGNOSIS — I48.11 LONGSTANDING PERSISTENT ATRIAL FIBRILLATION (HCC): Primary | ICD-10-CM

## 2022-08-22 DIAGNOSIS — I48.91 ATRIAL FIBRILLATION, UNSPECIFIED TYPE (HCC): ICD-10-CM

## 2022-08-22 LAB
INTERNATIONAL NORMALIZATION RATIO, POC: 2.9
PROTHROMBIN TIME, POC: 34.7

## 2022-08-22 PROCEDURE — 99999 PR OFFICE/OUTPT VISIT,PROCEDURE ONLY: CPT | Performed by: FAMILY MEDICINE

## 2022-08-22 PROCEDURE — 85610 PROTHROMBIN TIME: CPT | Performed by: FAMILY MEDICINE

## 2022-08-22 NOTE — PROGRESS NOTES
Confirmed current coumadin 2.5mg daily x 6day and 7th day 0 mg. INR 2.9 keep dose the same and recheck in 4 weeks.

## 2022-09-19 ENCOUNTER — NURSE ONLY (OUTPATIENT)
Dept: FAMILY MEDICINE CLINIC | Age: 72
End: 2022-09-19
Payer: MEDICARE

## 2022-09-19 DIAGNOSIS — I48.91 ATRIAL FIBRILLATION, UNSPECIFIED TYPE (HCC): ICD-10-CM

## 2022-09-19 LAB
INTERNATIONAL NORMALIZATION RATIO, POC: 2.7
PROTHROMBIN TIME, POC: 31.8

## 2022-09-19 PROCEDURE — 85610 PROTHROMBIN TIME: CPT | Performed by: FAMILY MEDICINE

## 2022-09-19 NOTE — PROGRESS NOTES
Confirmed current coumadin 2.5mg daily x 6day and 7th day 0 mg. INR 2.7 keep dose the same and recheck in 4 weeks.

## 2022-10-17 ENCOUNTER — NURSE ONLY (OUTPATIENT)
Dept: FAMILY MEDICINE CLINIC | Age: 72
End: 2022-10-17
Payer: MEDICARE

## 2022-10-17 DIAGNOSIS — I48.91 ATRIAL FIBRILLATION, UNSPECIFIED TYPE (HCC): Primary | ICD-10-CM

## 2022-10-17 LAB
INTERNATIONAL NORMALIZATION RATIO, POC: 2.4
PROTHROMBIN TIME, POC: 0

## 2022-10-17 PROCEDURE — 85610 PROTHROMBIN TIME: CPT | Performed by: FAMILY MEDICINE

## 2022-11-10 ENCOUNTER — OFFICE VISIT (OUTPATIENT)
Dept: FAMILY MEDICINE CLINIC | Age: 72
End: 2022-11-10
Payer: MEDICARE

## 2022-11-10 VITALS
DIASTOLIC BLOOD PRESSURE: 82 MMHG | SYSTOLIC BLOOD PRESSURE: 124 MMHG | HEART RATE: 67 BPM | WEIGHT: 315 LBS | BODY MASS INDEX: 40.43 KG/M2 | HEIGHT: 74 IN | OXYGEN SATURATION: 100 %

## 2022-11-10 DIAGNOSIS — E66.01 CLASS 3 SEVERE OBESITY DUE TO EXCESS CALORIES WITHOUT SERIOUS COMORBIDITY WITH BODY MASS INDEX (BMI) OF 40.0 TO 44.9 IN ADULT (HCC): Primary | ICD-10-CM

## 2022-11-10 DIAGNOSIS — R73.9 HYPERGLYCEMIA: ICD-10-CM

## 2022-11-10 DIAGNOSIS — E78.5 HYPERLIPIDEMIA, UNSPECIFIED HYPERLIPIDEMIA TYPE: ICD-10-CM

## 2022-11-10 DIAGNOSIS — I10 ESSENTIAL HYPERTENSION: ICD-10-CM

## 2022-11-10 DIAGNOSIS — G47.33 OBSTRUCTIVE SLEEP APNEA SYNDROME: ICD-10-CM

## 2022-11-10 DIAGNOSIS — I48.91 ATRIAL FIBRILLATION, UNSPECIFIED TYPE (HCC): ICD-10-CM

## 2022-11-10 LAB
A/G RATIO: 1.6 (ref 1.1–2.2)
ALBUMIN SERPL-MCNC: 4.3 G/DL (ref 3.4–5)
ALP BLD-CCNC: 58 U/L (ref 40–129)
ALT SERPL-CCNC: 14 U/L (ref 10–40)
ANION GAP SERPL CALCULATED.3IONS-SCNC: 18 MMOL/L (ref 3–16)
AST SERPL-CCNC: 21 U/L (ref 15–37)
BILIRUB SERPL-MCNC: 1.4 MG/DL (ref 0–1)
BUN BLDV-MCNC: 14 MG/DL (ref 7–20)
CALCIUM SERPL-MCNC: 9.6 MG/DL (ref 8.3–10.6)
CHLORIDE BLD-SCNC: 104 MMOL/L (ref 99–110)
CO2: 24 MMOL/L (ref 21–32)
CREAT SERPL-MCNC: 0.8 MG/DL (ref 0.8–1.3)
GFR SERPL CREATININE-BSD FRML MDRD: >60 ML/MIN/{1.73_M2}
GLUCOSE BLD-MCNC: 124 MG/DL (ref 70–99)
INTERNATIONAL NORMALIZATION RATIO, POC: 2
POTASSIUM SERPL-SCNC: 4.3 MMOL/L (ref 3.5–5.1)
PROTHROMBIN TIME, POC: 0
SODIUM BLD-SCNC: 146 MMOL/L (ref 136–145)
TOTAL PROTEIN: 7 G/DL (ref 6.4–8.2)

## 2022-11-10 PROCEDURE — 3074F SYST BP LT 130 MM HG: CPT | Performed by: FAMILY MEDICINE

## 2022-11-10 PROCEDURE — 85610 PROTHROMBIN TIME: CPT | Performed by: FAMILY MEDICINE

## 2022-11-10 PROCEDURE — 99214 OFFICE O/P EST MOD 30 MIN: CPT | Performed by: FAMILY MEDICINE

## 2022-11-10 PROCEDURE — 1123F ACP DISCUSS/DSCN MKR DOCD: CPT | Performed by: FAMILY MEDICINE

## 2022-11-10 PROCEDURE — 3078F DIAST BP <80 MM HG: CPT | Performed by: FAMILY MEDICINE

## 2022-11-10 RX ORDER — HYDROCHLOROTHIAZIDE 25 MG/1
25 TABLET ORAL DAILY
Qty: 90 TABLET | Refills: 1 | Status: SHIPPED | OUTPATIENT
Start: 2022-11-10

## 2022-11-10 RX ORDER — POTASSIUM CHLORIDE 20 MEQ/1
20 TABLET, EXTENDED RELEASE ORAL DAILY
Qty: 90 TABLET | Refills: 1 | Status: SHIPPED | OUTPATIENT
Start: 2022-11-10

## 2022-11-10 RX ORDER — LOSARTAN POTASSIUM 100 MG/1
100 TABLET ORAL DAILY
Qty: 90 TABLET | Refills: 1 | Status: SHIPPED | OUTPATIENT
Start: 2022-11-10

## 2022-11-10 RX ORDER — AMLODIPINE BESYLATE 5 MG/1
5 TABLET ORAL DAILY
Qty: 90 TABLET | Refills: 1 | Status: SHIPPED | OUTPATIENT
Start: 2022-11-10

## 2022-11-10 RX ORDER — AZELASTINE HYDROCHLORIDE 0.5 MG/ML
1 SOLUTION/ DROPS OPHTHALMIC 2 TIMES DAILY
Qty: 1 EACH | Refills: 5 | Status: SHIPPED | OUTPATIENT
Start: 2022-11-10

## 2022-11-10 ASSESSMENT — ENCOUNTER SYMPTOMS
SHORTNESS OF BREATH: 0
TROUBLE SWALLOWING: 0
ABDOMINAL PAIN: 0
WHEEZING: 0
VOMITING: 0
BLOOD IN STOOL: 0
NAUSEA: 0
DIARRHEA: 0
EYE PAIN: 0
CHEST TIGHTNESS: 0

## 2022-11-10 NOTE — PROGRESS NOTES
11/10/2022    [facility]    Chief Complaint   Patient presents with    6 Month Follow-Up    Other     Protime. HPI  History was obtained from the.  Jeffrey Patel is a 81 y.o. male who presents today with follow-up on hyperglycemia, increased weight, hyperlipidemia, sleep apnea, atrial fibs, and hypertension. 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 Mood remains positive no increase shortness of breath reported. Continue on same regimen INR today is 2.0 -as he remains on Coumadin. He had labs in June that looked good these include a CBC, CMP and lipid panel. No flare of allergies are reported. On review immunizations look good but he does need to get his COVID booster in near future. Today would advise CMP and A1c and multiple refills will be provided. REVIEW OF SYMPTOMS    Review of Systems   Constitutional:  Negative for activity change and fatigue. HENT:  Negative for congestion, hearing loss, mouth sores and trouble swallowing. Eyes:  Negative for pain and visual disturbance. Respiratory:  Negative for chest tightness, shortness of breath and wheezing. Cardiovascular:  Positive for palpitations. Negative for chest pain. Gastrointestinal:  Negative for abdominal pain, blood in stool, diarrhea, nausea and vomiting. Endocrine: Negative for polydipsia and polyuria. Genitourinary:  Negative for dysuria, frequency and urgency. Musculoskeletal:  Negative for arthralgias, gait problem and neck stiffness. Skin:  Negative for rash. Allergic/Immunologic: Negative for environmental allergies. Neurological:  Negative for dizziness, seizures, speech difficulty and weakness. Hematological:  Does not bruise/bleed easily. Psychiatric/Behavioral:  Negative for agitation, confusion, hallucinations, self-injury and suicidal ideas.       PAST MEDICAL HISTORY  Past Medical History:   Diagnosis Date    Allergic rhinitis 5/24/2019    Atrial fibrillation ([facility] [address]     Cancer ([facility] [address]     skin    Cardiomyopathy ([facility] [address] 5/24/2019    Hilar lymphadenopathy 5/24/2019    calcified    Hyperlipidemia 5/24/2019    Hypertension     Impotence 5/24/2019    Obstructive sleep apnea syndrome 5/24/2019    Peripheral venous insufficiency 5/24/2019    Sleep apnea        FAMILY HISTORY  Family History   Problem Relation Age of Onset    Arthritis Mother     Dementia Mother     Stroke Mother         TIAs    High Blood Pressure Father     Clotting Disorder Father     Other Father         aneurysym    Diabetes Sister     Gait Disorder Brother     High Blood Pressure Brother     Other Sister         aneurysym    Kidney Disease Sister     Cancer Brother         melanoma       SOCIAL HISTORY  Social History     Socioeconomic History    Marital status:    Tobacco Use    Smoking status: Never    Smokeless tobacco: Never   Vaping Use    Vaping Use: Never used   Substance and Sexual Activity    Alcohol use: No    Drug use: No     Social Determinants of Health     Financial Resource Strain: Low Risk     Difficulty of Paying Living Expenses: Not hard at all   Food Insecurity: No Food Insecurity    Worried About Running Out of Food in the Last Year: Never true    Ran Out of Food in the Last Year: Never true   Physical Activity: Insufficiently Active    Days of Exercise per Week: 3 days    Minutes of Exercise per Session: 40 min        SURGICAL HISTORY  Past Surgical History:   Procedure Laterality Date    APPENDECTOMY      CARDIOVERSION      KNEE ARTHROSCOPY      right knee                 CURRENT MEDICATIONS  Current Outpatient Medications   Medication Sig Dispense Refill    UNABLE TO FIND Beet powder for circulation.       amLODIPine (NORVASC) 5 MG tablet Take 1 tablet by mouth daily 90 tablet 1    azelastine (OPTIVAR) 0.05 % ophthalmic solution Place 1 drop into both eyes 2 times daily 1 each 5    hydroCHLOROthiazide (HYDRODIURIL) 25 MG tablet Take 1 tablet by mouth daily 90 tablet 1    losartan (COZAAR) 100 MG tablet Take 1 tablet by mouth daily 90 tablet 1    potassium chloride (KLOR-CON M20) 20 MEQ extended release tablet Take 1 tablet by mouth daily 90 tablet 1    warfarin (COUMADIN) 2.5 MG tablet Take 1 tablet by mouth See Admin Instructions 03/18/21 patient takes 2.5 mg every evening except on Mondays no therapy 90 tablet 1    Aspirin Buf,CaCarb-MgCarb-MgO, 81 MG TABS Take 1 tablet by mouth daily      fluticasone (FLONASE) 50 MCG/ACT nasal spray 2 sprays by Each Nostril route daily      Omega-3 1000 MG CAPS Take 1 capsule by mouth daily      Turmeric 500 MG TABS Take 1 tablet by mouth daily      vitamin D-3 (CHOLECALCIFEROL) 125 MCG (5000 UT) TABS Take 5,000 capsules by mouth daily       UNABLE TO FIND Gold oil-help blood pressure. (Patient not taking: Reported on 11/10/2022)       No current facility-administered medications for this visit. ALLERGIES  No Known Allergies    PHYSICAL EXAM    /82 (Site: Left Upper Arm, Position: Sitting, Cuff Size: Large Adult)   Pulse 67   Ht 6' 2\" (1.88 m)   Wt (!) 347 lb 4.8 oz (157.5 kg)   SpO2 100%   BMI 44.59 kg/m²     Physical Exam  Constitutional:       General: He is not in acute distress. Appearance: He is obese. He is not ill-appearing, toxic-appearing or diaphoretic. HENT:      Nose: Nose normal.      Mouth/Throat:      Mouth: Mucous membranes are moist.      Pharynx: Oropharynx is clear. Cardiovascular:      Rate and Rhythm: Rhythm irregular. Heart sounds: No murmur heard. No gallop. Pulmonary:      Effort: Pulmonary effort is normal. No respiratory distress. Breath sounds: No wheezing, rhonchi or rales. Musculoskeletal:         General: Swelling present. No deformity. Cervical back: Normal range of motion. Skin:     Coloration: Skin is not jaundiced. Findings: No bruising. Neurological:      General: No focal deficit present. Mental Status: Mental status is at baseline. Cranial Nerves:  No cranial nerve deficit. Motor: No weakness. Gait: Gait normal.   Psychiatric:         Mood and Affect: Mood normal.         Behavior: Behavior normal.       ASSESSMENT & PLAN     Diagnosis Orders   1. Class 3 severe obesity due to excess calories without serious comorbidity with body mass index (BMI) of 40.0 to 44.9 in adult [facility])        2. Atrial fibrillation, unspecified type ([address]  POCT INR      3. Essential hypertension  amLODIPine (NORVASC) 5 MG tablet    potassium chloride (KLOR-CON M20) 20 MEQ extended release tablet    Comprehensive Metabolic Panel      4. Obstructive sleep apnea syndrome        5. Hyperglycemia  Hemoglobin A1C      6. Hyperlipidemia, unspecified hyperlipidemia type        Will check A1c and CMP as outlined and he is to f/up for results. Med list reviewed and refills given. Patient advised to continue with healthy lifestyle ,regular exercise ,and mild weight loss. Keep appointments with subspecialists. He does need to get COVID booster in near future. Call with other issues or problems-- see him back 6 months. He is to continue with monthly INRs. Return in about 6 months (around 5/10/2023).          Electronically signed by Bruce Lynch MD on 11/10/2022

## 2022-11-11 LAB
ESTIMATED AVERAGE GLUCOSE: 128.4 MG/DL
HBA1C MFR BLD: 6.1 %

## 2022-11-12 DIAGNOSIS — I48.91 ATRIAL FIBRILLATION, UNSPECIFIED TYPE (HCC): ICD-10-CM

## 2022-11-14 RX ORDER — WARFARIN SODIUM 2.5 MG/1
TABLET ORAL
Qty: 90 TABLET | Refills: 0 | Status: SHIPPED | OUTPATIENT
Start: 2022-11-14

## 2022-12-08 ENCOUNTER — NURSE ONLY (OUTPATIENT)
Dept: FAMILY MEDICINE CLINIC | Age: 72
End: 2022-12-08

## 2022-12-08 DIAGNOSIS — I48.91 ATRIAL FIBRILLATION, UNSPECIFIED TYPE (HCC): ICD-10-CM

## 2022-12-08 LAB
INTERNATIONAL NORMALIZATION RATIO, POC: 2
PROTHROMBIN TIME, POC: NORMAL

## 2022-12-08 NOTE — PROGRESS NOTES
Confirmed current coumadin 2.5mg daily x 6day and 7th day 0 mg. INR 2.0 Keep dose the same and recheck in 4 weeks.

## 2023-01-03 DIAGNOSIS — I48.91 ATRIAL FIBRILLATION, UNSPECIFIED TYPE (HCC): ICD-10-CM

## 2023-01-03 RX ORDER — WARFARIN SODIUM 2.5 MG/1
TABLET ORAL
Qty: 90 TABLET | Refills: 1 | Status: SHIPPED | OUTPATIENT
Start: 2023-01-03

## 2023-01-05 ENCOUNTER — NURSE ONLY (OUTPATIENT)
Dept: FAMILY MEDICINE CLINIC | Age: 73
End: 2023-01-05

## 2023-01-05 DIAGNOSIS — I48.91 ATRIAL FIBRILLATION, UNSPECIFIED TYPE (HCC): ICD-10-CM

## 2023-01-05 LAB
INTERNATIONAL NORMALIZATION RATIO, POC: 2.1
PROTHROMBIN TIME, POC: NORMAL

## 2023-01-05 NOTE — PROGRESS NOTES
Confirmed current coumadin 2.5mg daily x 6day and 7th day 0 mg. INR 2.1 Keep dose the same and recheck in 4 weeks.

## 2023-02-02 ENCOUNTER — NURSE ONLY (OUTPATIENT)
Dept: FAMILY MEDICINE CLINIC | Age: 73
End: 2023-02-02
Payer: MEDICARE

## 2023-02-02 DIAGNOSIS — I48.91 ATRIAL FIBRILLATION, UNSPECIFIED TYPE (HCC): ICD-10-CM

## 2023-02-02 LAB
INTERNATIONAL NORMALIZATION RATIO, POC: 2.1
PROTHROMBIN TIME, POC: NORMAL

## 2023-02-02 PROCEDURE — 99999 PR OFFICE/OUTPT VISIT,PROCEDURE ONLY: CPT | Performed by: FAMILY MEDICINE

## 2023-02-02 PROCEDURE — 85610 PROTHROMBIN TIME: CPT | Performed by: FAMILY MEDICINE

## 2023-03-02 ENCOUNTER — NURSE ONLY (OUTPATIENT)
Dept: FAMILY MEDICINE CLINIC | Age: 73
End: 2023-03-02

## 2023-03-02 ENCOUNTER — ANTI-COAG VISIT (OUTPATIENT)
Dept: FAMILY MEDICINE CLINIC | Age: 73
End: 2023-03-02

## 2023-03-02 DIAGNOSIS — I48.91 ATRIAL FIBRILLATION, UNSPECIFIED TYPE (HCC): ICD-10-CM

## 2023-03-02 DIAGNOSIS — I48.11 LONGSTANDING PERSISTENT ATRIAL FIBRILLATION (HCC): Primary | ICD-10-CM

## 2023-03-02 LAB
INTERNATIONAL NORMALIZATION RATIO, POC: 2.3
PROTHROMBIN TIME, POC: 27.3

## 2023-03-02 NOTE — PROGRESS NOTES
Confirmed current coumadin 2.5mg daily x 6day and 7th day 0 mg. INR 2.3. Keep dose the same and recheck in 4 weeks.

## 2023-03-23 ENCOUNTER — OFFICE VISIT (OUTPATIENT)
Dept: CARDIOLOGY CLINIC | Age: 73
End: 2023-03-23
Payer: MEDICARE

## 2023-03-23 VITALS
HEART RATE: 72 BPM | SYSTOLIC BLOOD PRESSURE: 130 MMHG | DIASTOLIC BLOOD PRESSURE: 78 MMHG | BODY MASS INDEX: 39.17 KG/M2 | HEIGHT: 75 IN | WEIGHT: 315 LBS

## 2023-03-23 DIAGNOSIS — E66.01 OBESITY, CLASS III, BMI 40-49.9 (MORBID OBESITY) (HCC): ICD-10-CM

## 2023-03-23 DIAGNOSIS — E78.5 HYPERLIPIDEMIA, UNSPECIFIED HYPERLIPIDEMIA TYPE: ICD-10-CM

## 2023-03-23 DIAGNOSIS — I48.91 ATRIAL FIBRILLATION, UNSPECIFIED TYPE (HCC): Primary | ICD-10-CM

## 2023-03-23 DIAGNOSIS — I87.2 PERIPHERAL VENOUS INSUFFICIENCY: ICD-10-CM

## 2023-03-23 DIAGNOSIS — G47.33 OBSTRUCTIVE SLEEP APNEA SYNDROME: ICD-10-CM

## 2023-03-23 DIAGNOSIS — I42.9 CARDIOMYOPATHY, UNSPECIFIED TYPE (HCC): ICD-10-CM

## 2023-03-23 DIAGNOSIS — R73.9 HYPERGLYCEMIA: ICD-10-CM

## 2023-03-23 PROCEDURE — 99204 OFFICE O/P NEW MOD 45 MIN: CPT | Performed by: INTERNAL MEDICINE

## 2023-03-23 PROCEDURE — 1123F ACP DISCUSS/DSCN MKR DOCD: CPT | Performed by: INTERNAL MEDICINE

## 2023-03-23 PROCEDURE — 93000 ELECTROCARDIOGRAM COMPLETE: CPT | Performed by: INTERNAL MEDICINE

## 2023-03-23 NOTE — PATIENT INSTRUCTIONS
managed with the help of compression stockings. I spent about 30 min. of time in review of the available data, chart Prep., interviewing patient, obtaining history, performing physical exam, going through decision making analysis for assessment & plans of management on this patient. Office Visit for test results.

## 2023-03-23 NOTE — PROGRESS NOTES
(160.6 kg)   BMI 44.25 kg/m²    Wt Readings from Last 3 Encounters:   03/23/23 (!) 354 lb (160.6 kg)   11/10/22 (!) 347 lb 4.8 oz (157.5 kg)   06/13/22 (!) 365 lb (165.6 kg)     Body mass index is 44.25 kg/m². General Appearance: Morbidly obese. obese/Well Nourished  1. Skin: It is warm & dry. No rashes noted. 2. Eyes: No conjunctival Pallor seen. No jaundice noted. 3. Neck: is supple there is no elevation of JVD. No thyromegaly  4. Respiratory:  Resp Assessment: No abnormal findings. Resp Auscultation: Vesicular breath sounds without rales or wheezing. 5. Cardiovascular: Auscultation: Normal S1 & S2, No prominent murmurs  Carotid Arteries: No bruits present  Abdominal Aorta: Non-palpable  Pedal Pulses: 2+ and equal   6. Abdomen:  No masses or tenderness  Liver/Spleen: No Abnormalities Noted, no organomegaly. 7. Musculoskeletal: No joint deformities. No muscle wasting  8. Extremities:   No Cyanosis or Clubbing. No significant edema   9. Rectal / genital:   Deferred  10.  Neurological/Psychiatric:  Oriented to time, place, and person  Non-anxious    Lab Results   Component Value Date/Time    CKTOTAL 408 04/01/2012 12:01 AM    CKMB 8.8 04/01/2012 12:01 AM    TROPONINT <0.010 03/18/2021 10:15 AM     BNP:    Lab Results   Component Value Date/Time    BNP 73 04/01/2012 12:01 AM     PT/INR:  No results found for: PTINR  Lab Results   Component Value Date    LABA1C 6.1 11/10/2022    LABA1C 6.5 05/16/2022     Lab Results   Component Value Date    CHOL 165 05/16/2022    CHOL 164 05/12/2021    TRIG 126 05/16/2022    TRIG 142 05/12/2021    HDL 42 05/16/2022    HDL 42 05/12/2021    LDLCALC 98 05/16/2022    LDLCALC 94 05/12/2021    CHOLHDLRATIO 4.3 09/17/2018     Lab Results   Component Value Date    ALT 14 11/10/2022    ALT 19 06/13/2022    AST 21 11/10/2022    AST 23 06/13/2022     BMP:    Lab Results   Component Value Date/Time     11/10/2022 09:09 AM     06/13/2022 02:56 PM    K 4.3 11/10/2022 09:09 AM

## 2023-03-23 NOTE — LETTER
March 23, 2023      [facility], [facility] [address]      Patient: Sumeet Barrera   MR Number: [id number]   YOB: 1950   Date of Visit: 3/23/2023       [facility]    Thank you for referring Leana Dupont to me for evaluation/treatment. Below are the relevant portions of my assessment and plan of care. If you have questions, please do not hesitate to call me. I look forward to following [place] along with you.     Sincerely,        Ignacio Burt MD

## 2023-03-30 ENCOUNTER — NURSE ONLY (OUTPATIENT)
Dept: FAMILY MEDICINE CLINIC | Age: 73
End: 2023-03-30

## 2023-03-30 DIAGNOSIS — I48.91 ATRIAL FIBRILLATION, UNSPECIFIED TYPE (HCC): Primary | ICD-10-CM

## 2023-03-30 LAB — INTERNATIONAL NORMALIZATION RATIO, POC: 2

## 2023-03-30 NOTE — PROGRESS NOTES
Confirmed current coumadin 2.5mg daily x 6day and 7th day 0 mg. INR 2.0 Keep dose the same and recheck in 4 weeks. Procedure:  Bilateral 1st MTP arthrodesis (Bilateral Foot)    Relevant Problems   CARDIO   (+) Mixed hyperlipidemia      GI/HEPATIC   (+) GERD (gastroesophageal reflux disease)      /RENAL   (+) Benign hypertension with CKD (chronic kidney disease), stage II   (+) Stage 3a chronic kidney disease (HCC)      MUSCULOSKELETAL   (+) Arthritis of both feet   (+) Primary osteoarthritis of left foot   (+) Primary osteoarthritis of right foot      NEURO/PSYCH   (+) History of colon polyps   (+) Major depressive disorder, single episode, mild (HCC)      PULMONARY   (+) Centrilobular emphysema (HCC)   (+) SOB (shortness of breath)   (+) Sleep apnea      Former 35 py smoker    Physical Exam    Airway    Mallampati score: IV  TM Distance: >3 FB  Neck ROM: full     Dental   No notable dental hx     Cardiovascular      Pulmonary      Other Findings        Anesthesia Plan  ASA Score- 3     Anesthesia Type- general with ASA Monitors  Additional Monitors:   Airway Plan: LMA  Comment: Patient seen and examined, history reviewed  Patient to be done under general anesthesia with LMA and routine monitors  Risks discussed with the patient, consent obtained          Plan Factors-Exercise tolerance (METS): >4 METS  Chart reviewed  Existing labs reviewed  Patient summary reviewed  Induction- intravenous  Postoperative Plan-     Informed Consent- Anesthetic plan and risks discussed with patient  I personally reviewed this patient with the CRNA  Discussed and agreed on the Anesthesia Plan with the CRNA  Hari Graves

## 2023-04-18 ENCOUNTER — OFFICE VISIT (OUTPATIENT)
Dept: CARDIOLOGY CLINIC | Age: 73
End: 2023-04-18
Payer: MEDICARE

## 2023-04-18 VITALS
HEART RATE: 58 BPM | BODY MASS INDEX: 40.43 KG/M2 | DIASTOLIC BLOOD PRESSURE: 82 MMHG | WEIGHT: 315 LBS | HEIGHT: 74 IN | SYSTOLIC BLOOD PRESSURE: 134 MMHG

## 2023-04-18 DIAGNOSIS — E78.5 HYPERLIPIDEMIA, UNSPECIFIED HYPERLIPIDEMIA TYPE: ICD-10-CM

## 2023-04-18 DIAGNOSIS — G47.33 OBSTRUCTIVE SLEEP APNEA SYNDROME: ICD-10-CM

## 2023-04-18 DIAGNOSIS — I48.91 ATRIAL FIBRILLATION, UNSPECIFIED TYPE (HCC): Primary | ICD-10-CM

## 2023-04-18 DIAGNOSIS — I87.2 PERIPHERAL VENOUS INSUFFICIENCY: ICD-10-CM

## 2023-04-18 DIAGNOSIS — I42.9 CARDIOMYOPATHY, UNSPECIFIED TYPE (HCC): ICD-10-CM

## 2023-04-18 PROCEDURE — 99213 OFFICE O/P EST LOW 20 MIN: CPT | Performed by: INTERNAL MEDICINE

## 2023-04-18 PROCEDURE — 1123F ACP DISCUSS/DSCN MKR DOCD: CPT | Performed by: INTERNAL MEDICINE

## 2023-04-18 NOTE — PROGRESS NOTES
taking is reviewed in detail with the patient. Discussed any side effects or problems taking the medication. Recommend Continue present management & medications as listed. AFFIRMATION: I spent at least 20 minutes of time reviewing patient's history, previous & current medical problems & all Labs + testing. This includes chart prep even prior to the vosit. Various goals are discussed and multiple questions answered. Relevant concelling performed. Office follow up in six months.

## 2023-04-18 NOTE — PATIENT INSTRUCTIONS
Please be informed that if you contact our office outside of normal business hours the physician on call cannot help with any scheduling or rescheduling issues, procedure instruction questions or any type of medication issue. We advise you for any urgent/emergency that you go to the nearest emergency room! PLEASE CALL OUR OFFICE DURING NORMAL BUSINESS HOURS    Monday - Friday   8 am to 5 pm    [place] [address]: [phone]    [place]:  [phone]  **It is YOUR responsibilty to bring medication bottles and/or updated medication list to [address] [place] [address]. This will allow us to better serve you and all your healthcare needs**  [facility] Locations - No appointment necessary. Sites open Monday to Friday. Call your preferred location for test preparation, business   hours and other information you need. [employer]. [address] Diana [place], [address] [facility]  Phone: [phone] Walt Avilez  [address] [facility]  [address] Walt Avilez, [address] [facility]  Phone: [phone]     Thank you for allowing us to care for you today! We want to ensure we can follow your treatment plan and we strive to give you the best outcomes and experience possible. If you ever have a life threatening emergency and call 911 - for an ambulance (EMS)   Our providers can only care for you at:   [facility] or [facility]. Even if you have someone take you or you drive yourself we can only care for you in a [facility] facility. Our providers are not setup at the other healthcare locations! CORONARY ARTERY DISEASE:None known. HYPERTENSION:Yes  well controlled on current medical regimen.  - changes in  treatment:   no. Patient is on Amlodipine, HCTZ & Cozaar. Counseled regarding low salt diet, exercise & weight control. VALVULAR HEART DISEASE:yes,   4/11/2023   Limited study due to patients body habitus.    Left ventricular function

## 2023-05-04 DIAGNOSIS — I10 ESSENTIAL HYPERTENSION: ICD-10-CM

## 2023-05-04 RX ORDER — POTASSIUM CHLORIDE 20 MEQ/1
TABLET, EXTENDED RELEASE ORAL
Qty: 90 TABLET | Refills: 1 | Status: SHIPPED | OUTPATIENT
Start: 2023-05-04

## 2023-05-04 RX ORDER — AMLODIPINE BESYLATE 5 MG/1
TABLET ORAL
Qty: 90 TABLET | Refills: 1 | Status: SHIPPED | OUTPATIENT
Start: 2023-05-04

## 2023-05-04 RX ORDER — HYDROCHLOROTHIAZIDE 25 MG/1
TABLET ORAL
Qty: 90 TABLET | Refills: 1 | Status: SHIPPED | OUTPATIENT
Start: 2023-05-04

## 2023-05-04 RX ORDER — LOSARTAN POTASSIUM 100 MG/1
TABLET ORAL
Qty: 90 TABLET | Refills: 1 | Status: SHIPPED | OUTPATIENT
Start: 2023-05-04

## 2023-05-10 ENCOUNTER — OFFICE VISIT (OUTPATIENT)
Dept: FAMILY MEDICINE CLINIC | Age: 73
End: 2023-05-10
Payer: MEDICARE

## 2023-05-10 VITALS
HEIGHT: 74 IN | OXYGEN SATURATION: 100 % | SYSTOLIC BLOOD PRESSURE: 128 MMHG | WEIGHT: 315 LBS | HEART RATE: 72 BPM | BODY MASS INDEX: 40.43 KG/M2 | RESPIRATION RATE: 12 BRPM | DIASTOLIC BLOOD PRESSURE: 72 MMHG

## 2023-05-10 DIAGNOSIS — E78.5 HYPERLIPIDEMIA, UNSPECIFIED HYPERLIPIDEMIA TYPE: Primary | ICD-10-CM

## 2023-05-10 DIAGNOSIS — I48.91 ATRIAL FIBRILLATION, UNSPECIFIED TYPE (HCC): ICD-10-CM

## 2023-05-10 DIAGNOSIS — R73.9 HYPERGLYCEMIA: ICD-10-CM

## 2023-05-10 DIAGNOSIS — E66.01 CLASS 3 SEVERE OBESITY DUE TO EXCESS CALORIES WITHOUT SERIOUS COMORBIDITY WITH BODY MASS INDEX (BMI) OF 40.0 TO 44.9 IN ADULT (HCC): ICD-10-CM

## 2023-05-10 DIAGNOSIS — G47.33 OBSTRUCTIVE SLEEP APNEA SYNDROME: ICD-10-CM

## 2023-05-10 DIAGNOSIS — E78.5 HYPERLIPIDEMIA, UNSPECIFIED HYPERLIPIDEMIA TYPE: ICD-10-CM

## 2023-05-10 LAB
ALBUMIN SERPL-MCNC: 4.2 G/DL (ref 3.4–5)
ALBUMIN/GLOB SERPL: 1.5 {RATIO} (ref 1.1–2.2)
ALP SERPL-CCNC: 57 U/L (ref 40–129)
ALT SERPL-CCNC: 18 U/L (ref 10–40)
ANION GAP SERPL CALCULATED.3IONS-SCNC: 11 MMOL/L (ref 3–16)
AST SERPL-CCNC: 20 U/L (ref 15–37)
BILIRUB SERPL-MCNC: 1.1 MG/DL (ref 0–1)
BUN SERPL-MCNC: 13 MG/DL (ref 7–20)
CALCIUM SERPL-MCNC: 9.2 MG/DL (ref 8.3–10.6)
CHLORIDE SERPL-SCNC: 101 MMOL/L (ref 99–110)
CHOLEST SERPL-MCNC: 155 MG/DL (ref 0–199)
CO2 SERPL-SCNC: 29 MMOL/L (ref 21–32)
CREAT SERPL-MCNC: 0.7 MG/DL (ref 0.8–1.3)
DEPRECATED RDW RBC AUTO: 13.6 % (ref 12.4–15.4)
GFR SERPLBLD CREATININE-BSD FMLA CKD-EPI: >60 ML/MIN/{1.73_M2}
GLUCOSE SERPL-MCNC: 127 MG/DL (ref 70–99)
HCT VFR BLD AUTO: 48.5 % (ref 40.5–52.5)
HDLC SERPL-MCNC: 51 MG/DL (ref 40–60)
HGB BLD-MCNC: 16.2 G/DL (ref 13.5–17.5)
INTERNATIONAL NORMALIZATION RATIO, POC: 2.2
LDLC SERPL CALC-MCNC: 83 MG/DL
MCH RBC QN AUTO: 31.9 PG (ref 26–34)
MCHC RBC AUTO-ENTMCNC: 33.4 G/DL (ref 31–36)
MCV RBC AUTO: 95.6 FL (ref 80–100)
PLATELET # BLD AUTO: 223 K/UL (ref 135–450)
PMV BLD AUTO: 8.6 FL (ref 5–10.5)
POTASSIUM SERPL-SCNC: 3.9 MMOL/L (ref 3.5–5.1)
PROT SERPL-MCNC: 7 G/DL (ref 6.4–8.2)
PROTHROMBIN TIME, POC: 0
RBC # BLD AUTO: 5.07 M/UL (ref 4.2–5.9)
SODIUM SERPL-SCNC: 141 MMOL/L (ref 136–145)
TRIGL SERPL-MCNC: 105 MG/DL (ref 0–150)
VLDLC SERPL CALC-MCNC: 21 MG/DL
WBC # BLD AUTO: 7.2 K/UL (ref 4–11)

## 2023-05-10 PROCEDURE — 0124A COVID-19, PFIZER BIVALENT BOOSTER, DO NOT DILUTE, (AGE 12Y+), IM, 30 MCG/0.3 ML: CPT | Performed by: FAMILY MEDICINE

## 2023-05-10 PROCEDURE — 1123F ACP DISCUSS/DSCN MKR DOCD: CPT | Performed by: FAMILY MEDICINE

## 2023-05-10 PROCEDURE — 85610 PROTHROMBIN TIME: CPT | Performed by: FAMILY MEDICINE

## 2023-05-10 PROCEDURE — 99214 OFFICE O/P EST MOD 30 MIN: CPT | Performed by: FAMILY MEDICINE

## 2023-05-10 PROCEDURE — 91312 COVID-19, PFIZER BIVALENT BOOSTER, DO NOT DILUTE, (AGE 12Y+), IM, 30 MCG/0.3 ML: CPT | Performed by: FAMILY MEDICINE

## 2023-05-10 SDOH — ECONOMIC STABILITY: HOUSING INSECURITY
IN THE LAST 12 MONTHS, WAS THERE A TIME WHEN YOU DID NOT HAVE A STEADY PLACE TO SLEEP OR SLEPT IN A SHELTER (INCLUDING NOW)?: NO

## 2023-05-10 SDOH — ECONOMIC STABILITY: FOOD INSECURITY: WITHIN THE PAST 12 MONTHS, YOU WORRIED THAT YOUR FOOD WOULD RUN OUT BEFORE YOU GOT MONEY TO BUY MORE.: NEVER TRUE

## 2023-05-10 SDOH — ECONOMIC STABILITY: INCOME INSECURITY: HOW HARD IS IT FOR YOU TO PAY FOR THE VERY BASICS LIKE FOOD, HOUSING, MEDICAL CARE, AND HEATING?: NOT VERY HARD

## 2023-05-10 SDOH — ECONOMIC STABILITY: FOOD INSECURITY: WITHIN THE PAST 12 MONTHS, THE FOOD YOU BOUGHT JUST DIDN'T LAST AND YOU DIDN'T HAVE MONEY TO GET MORE.: NEVER TRUE

## 2023-05-10 ASSESSMENT — ENCOUNTER SYMPTOMS
SHORTNESS OF BREATH: 0
NAUSEA: 0
VOMITING: 0
WHEEZING: 0
ABDOMINAL PAIN: 0
DIARRHEA: 0
CHEST TIGHTNESS: 0
BLOOD IN STOOL: 0
TROUBLE SWALLOWING: 0
APNEA: 1
EYE PAIN: 0

## 2023-05-10 ASSESSMENT — PATIENT HEALTH QUESTIONNAIRE - PHQ9
SUM OF ALL RESPONSES TO PHQ9 QUESTIONS 1 & 2: 0
SUM OF ALL RESPONSES TO PHQ QUESTIONS 1-9: 0
SUM OF ALL RESPONSES TO PHQ QUESTIONS 1-9: 0
2. FEELING DOWN, DEPRESSED OR HOPELESS: 0
SUM OF ALL RESPONSES TO PHQ QUESTIONS 1-9: 0
1. LITTLE INTEREST OR PLEASURE IN DOING THINGS: 0
SUM OF ALL RESPONSES TO PHQ QUESTIONS 1-9: 0

## 2023-05-10 NOTE — PROGRESS NOTES
Confirmed current coumadin 2.5mg daily x 6day and 7th day 0 mg. INR 2.2 Keep dose the same and recheck in 4 weeks.
Appearance: Normal appearance. He is well-developed. He is obese. He is not ill-appearing or toxic-appearing. HENT:      Head: Normocephalic and atraumatic. Nose: Nose normal. No congestion. Mouth/Throat:      Mouth: Mucous membranes are moist.      Pharynx: Oropharynx is clear. Eyes:      Pupils: Pupils are equal, round, and reactive to light. Cardiovascular:      Rate and Rhythm: Normal rate. Rhythm irregular. Heart sounds: Normal heart sounds. No murmur heard. No gallop. Pulmonary:      Effort: Pulmonary effort is normal. No respiratory distress. Breath sounds: Normal breath sounds. No wheezing, rhonchi or rales. Abdominal:      Palpations: Abdomen is soft. Musculoskeletal:         General: No swelling or deformity. Normal range of motion. Cervical back: Normal range of motion and neck supple. No rigidity. Lymphadenopathy:      Cervical: No cervical adenopathy. Skin:     General: Skin is warm and dry. Coloration: Skin is not jaundiced. Findings: No bruising or lesion. Neurological:      Mental Status: He is alert and oriented to person, place, and time. Mental status is at baseline. Cranial Nerves: No cranial nerve deficit. Motor: No weakness. Psychiatric:         Mood and Affect: Mood normal.         Thought Content: Thought content normal.       ASSESSMENT & PLAN     Diagnosis Orders   1. Hyperlipidemia, unspecified hyperlipidemia type  Comprehensive Metabolic Panel    LIPID PANEL      2. Atrial fibrillation, unspecified type ([address]  POCT INR    CBC      3. Obstructive sleep apnea syndrome        4. Hyperglycemia  Hemoglobin A1C      5. Class 3 severe obesity due to excess calories without serious comorbidity with body mass index (BMI) of 40.0 to 44.9 in adult [facility])        We will check CMP, CBC, lipid panel and A1c-we will follow-up for results. He is to continue to work on healthy lifestyle as well as weight loss.   He did receive a bivalent

## 2023-05-11 LAB
EST. AVERAGE GLUCOSE BLD GHB EST-MCNC: 139.9 MG/DL
HBA1C MFR BLD: 6.5 %

## 2023-05-25 ENCOUNTER — TELEMEDICINE (OUTPATIENT)
Dept: FAMILY MEDICINE CLINIC | Age: 73
End: 2023-05-25
Payer: MEDICARE

## 2023-05-25 DIAGNOSIS — Z00.00 MEDICARE ANNUAL WELLNESS VISIT, SUBSEQUENT: Primary | ICD-10-CM

## 2023-05-25 PROCEDURE — G0439 PPPS, SUBSEQ VISIT: HCPCS | Performed by: FAMILY MEDICINE

## 2023-05-25 PROCEDURE — 1123F ACP DISCUSS/DSCN MKR DOCD: CPT | Performed by: FAMILY MEDICINE

## 2023-05-25 ASSESSMENT — PATIENT HEALTH QUESTIONNAIRE - PHQ9
2. FEELING DOWN, DEPRESSED OR HOPELESS: 0
SUM OF ALL RESPONSES TO PHQ9 QUESTIONS 1 & 2: 0
SUM OF ALL RESPONSES TO PHQ QUESTIONS 1-9: 0
1. LITTLE INTEREST OR PLEASURE IN DOING THINGS: 0
SUM OF ALL RESPONSES TO PHQ QUESTIONS 1-9: 0

## 2023-05-25 ASSESSMENT — LIFESTYLE VARIABLES
HOW OFTEN DO YOU HAVE A DRINK CONTAINING ALCOHOL: NEVER
HOW MANY STANDARD DRINKS CONTAINING ALCOHOL DO YOU HAVE ON A TYPICAL DAY: PATIENT DOES NOT DRINK

## 2023-05-25 NOTE — PATIENT INSTRUCTIONS
Personalized Preventive Plan for Emmy Boss - 5/25/2023  Medicare offers a range of preventive health benefits. Some of the tests and screenings are paid in full while other may be subject to a deductible, co-insurance, and/or copay. Some of these benefits include a comprehensive review of your medical history including lifestyle, illnesses that may run in your family, and various assessments and screenings as appropriate. After reviewing your medical record and screening and assessments performed today your provider may have ordered immunizations, labs, imaging, and/or referrals for you. A list of these orders (if applicable) as well as your Preventive Care list are included within your After Visit Summary for your review. Other Preventive Recommendations:    A preventive eye exam performed by an eye specialist is recommended every 1-2 years to screen for glaucoma; cataracts, macular degeneration, and other eye disorders. A preventive dental visit is recommended every 6 months. Try to get at least 150 minutes of exercise per week or 10,000 steps per day on a pedometer . Order or download the FREE \"Exercise & Physical Activity: Your Everyday Guide\" from [employer]. Call 3-191.457.8543 or search [employer] online. You need 9504-1751 mg of calcium and 0961-6353 IU of vitamin D per day. It is possible to meet your calcium requirement with diet alone, but a vitamin D supplement is usually necessary to meet this goal.  When exposed to the sun, use a sunscreen that protects against both UVA and UVB radiation with an SPF of 30 or greater. Reapply every 2 to 3 hours or after sweating, drying off with a towel, or swimming. Always wear a seat belt when traveling in a car. Always wear a helmet when riding a bicycle or motorcycle.

## 2023-05-25 NOTE — PROGRESS NOTES
Medicare Annual Wellness Visit    Prashant Tariq is here for Medicare AWV    Assessment & Plan   Medicare annual wellness visit, subsequent      Recommendations for Preventive Services Due: see orders and patient instructions/AVS.  Recommended screening schedule for the next 5-10 years is provided to the patient in written form: see Patient Instructions/AVS.     No follow-ups on file. Subjective       Patient's complete Health Risk Assessment and screening values have been reviewed and are found in Flowsheets. The following problems were reviewed today and where indicated follow up appointments were made and/or referrals ordered. Positive Risk Factor Screenings with Interventions:                 Weight and Activity:  Physical Activity: Insufficiently Active    Days of Exercise per Week: 2 days    Minutes of Exercise per Session: 40 min     On average, how many days per week do you engage in moderate to strenuous exercise (like a brisk walk)?: 2 days  Have you lost any weight without trying in the past 3 months?: No  There is no height or weight on file to calculate BMI. (!) Abnormal  Obesity Interventions:  Patient comments: states he has lost a little weight since he is working part time. Patient declines any further evaluation or treatment           Hearing Screen:  Do you or your family notice any trouble with your hearing that hasn't been managed with hearing aids?: (!) Yes (a little, no referral)    Interventions:  Patient comments: states he has a little hearing loss. Patient declines any further evaluation or treatment    Vision Screen:  Do you have difficulty driving, watching TV, or doing any of your daily activities because of your eyesight?: No  Have you had an eye exam within the past year?: (!) No  No results found.     Interventions:   Patient encouraged to make appointment with their eye specialist    Safety:  Do you always fasten your seatbelt when you are in a car?: (!) No

## 2023-06-07 ENCOUNTER — NURSE ONLY (OUTPATIENT)
Dept: FAMILY MEDICINE CLINIC | Age: 73
End: 2023-06-07
Payer: MEDICARE

## 2023-06-07 DIAGNOSIS — I48.91 ATRIAL FIBRILLATION, UNSPECIFIED TYPE (HCC): ICD-10-CM

## 2023-06-07 LAB
INTERNATIONAL NORMALIZATION RATIO, POC: 2
PROTHROMBIN TIME, POC: 0

## 2023-06-07 PROCEDURE — 85610 PROTHROMBIN TIME: CPT | Performed by: FAMILY MEDICINE

## 2023-06-07 PROCEDURE — 99999 PR OFFICE/OUTPT VISIT,PROCEDURE ONLY: CPT | Performed by: FAMILY MEDICINE

## 2023-07-05 ENCOUNTER — NURSE ONLY (OUTPATIENT)
Dept: FAMILY MEDICINE CLINIC | Age: 73
End: 2023-07-05
Payer: MEDICARE

## 2023-07-05 DIAGNOSIS — I48.91 ATRIAL FIBRILLATION, UNSPECIFIED TYPE (HCC): ICD-10-CM

## 2023-07-05 LAB
INTERNATIONAL NORMALIZATION RATIO, POC: 2.3
PROTHROMBIN TIME, POC: 0

## 2023-07-05 PROCEDURE — 85610 PROTHROMBIN TIME: CPT | Performed by: FAMILY MEDICINE

## 2023-07-05 PROCEDURE — 99999 PR OFFICE/OUTPT VISIT,PROCEDURE ONLY: CPT | Performed by: FAMILY MEDICINE

## 2023-07-05 NOTE — PROGRESS NOTES
Presenting for INR Check. INR: 2.3. Confirmed dose 2.5mg every day, hold Mondays. Keep same dose. Recheck in 4 weeks.

## 2023-08-02 ENCOUNTER — NURSE ONLY (OUTPATIENT)
Dept: FAMILY MEDICINE CLINIC | Age: 73
End: 2023-08-02
Payer: MEDICARE

## 2023-08-02 DIAGNOSIS — I48.91 ATRIAL FIBRILLATION, UNSPECIFIED TYPE (HCC): ICD-10-CM

## 2023-08-02 LAB
INTERNATIONAL NORMALIZATION RATIO, POC: 2.2
PROTHROMBIN TIME, POC: 0

## 2023-08-02 PROCEDURE — 99999 PR OFFICE/OUTPT VISIT,PROCEDURE ONLY: CPT | Performed by: FAMILY MEDICINE

## 2023-08-02 PROCEDURE — 85610 PROTHROMBIN TIME: CPT | Performed by: FAMILY MEDICINE

## 2023-08-02 RX ORDER — WARFARIN SODIUM 2.5 MG/1
TABLET ORAL
Qty: 90 TABLET | Refills: 1 | Status: SHIPPED | OUTPATIENT
Start: 2023-08-02

## 2023-09-05 ENCOUNTER — NURSE ONLY (OUTPATIENT)
Dept: FAMILY MEDICINE CLINIC | Age: 73
End: 2023-09-05

## 2023-09-05 DIAGNOSIS — I42.9 CARDIOMYOPATHY, UNSPECIFIED TYPE (HCC): ICD-10-CM

## 2023-09-05 DIAGNOSIS — I87.2 PERIPHERAL VENOUS INSUFFICIENCY: Primary | ICD-10-CM

## 2023-09-05 LAB — INTERNATIONAL NORMALIZATION RATIO, POC: 2.5

## 2023-09-05 PROCEDURE — 99999 PR OFFICE/OUTPT VISIT,PROCEDURE ONLY: CPT | Performed by: FAMILY MEDICINE

## 2023-09-05 NOTE — PROGRESS NOTES
Presenting for INR Check. INR: 2.5  Confirmed dose 2.5 mg every day, hold Mondays. Keep same dose. Recheck in 4 weeks.

## 2023-10-03 ENCOUNTER — NURSE ONLY (OUTPATIENT)
Dept: FAMILY MEDICINE CLINIC | Age: 73
End: 2023-10-03
Payer: MEDICARE

## 2023-10-03 DIAGNOSIS — I48.91 ATRIAL FIBRILLATION, UNSPECIFIED TYPE (HCC): ICD-10-CM

## 2023-10-03 LAB
INTERNATIONAL NORMALIZATION RATIO, POC: 2.4
PROTHROMBIN TIME, POC: NORMAL

## 2023-10-03 PROCEDURE — 85610 PROTHROMBIN TIME: CPT | Performed by: FAMILY MEDICINE

## 2023-10-03 PROCEDURE — 99999 PR OFFICE/OUTPT VISIT,PROCEDURE ONLY: CPT | Performed by: FAMILY MEDICINE

## 2023-10-03 NOTE — PROGRESS NOTES
Presenting for INR Check. INR: 2.4  Confirmed dose 2.5 mg every day, hold Mondays. Keep same dose. Recheck in 4 weeks.

## 2023-10-18 ENCOUNTER — OFFICE VISIT (OUTPATIENT)
Dept: CARDIOLOGY CLINIC | Age: 73
End: 2023-10-18
Payer: MEDICARE

## 2023-10-18 VITALS
BODY MASS INDEX: 45.99 KG/M2 | SYSTOLIC BLOOD PRESSURE: 120 MMHG | WEIGHT: 315 LBS | DIASTOLIC BLOOD PRESSURE: 80 MMHG | HEART RATE: 56 BPM

## 2023-10-18 DIAGNOSIS — G47.33 OBSTRUCTIVE SLEEP APNEA SYNDROME: ICD-10-CM

## 2023-10-18 DIAGNOSIS — I87.2 PERIPHERAL VENOUS INSUFFICIENCY: ICD-10-CM

## 2023-10-18 DIAGNOSIS — E66.01 CLASS 3 SEVERE OBESITY DUE TO EXCESS CALORIES WITHOUT SERIOUS COMORBIDITY WITH BODY MASS INDEX (BMI) OF 40.0 TO 44.9 IN ADULT (HCC): ICD-10-CM

## 2023-10-18 DIAGNOSIS — I48.91 ATRIAL FIBRILLATION, UNSPECIFIED TYPE (HCC): Primary | ICD-10-CM

## 2023-10-18 DIAGNOSIS — E78.5 HYPERLIPIDEMIA, UNSPECIFIED HYPERLIPIDEMIA TYPE: ICD-10-CM

## 2023-10-18 DIAGNOSIS — I42.9 CARDIOMYOPATHY, UNSPECIFIED TYPE (HCC): ICD-10-CM

## 2023-10-18 PROCEDURE — 99213 OFFICE O/P EST LOW 20 MIN: CPT | Performed by: INTERNAL MEDICINE

## 2023-10-18 PROCEDURE — 1123F ACP DISCUSS/DSCN MKR DOCD: CPT | Performed by: INTERNAL MEDICINE

## 2023-10-18 NOTE — PATIENT INSTRUCTIONS
CORONARY ARTERY DISEASE:None known. HYPERTENSION:Yes  well controlled on current medical regimen.  - changes in  treatment:   no. Patient is on Amlodipine, HCTZ & Cozaar. Counseled regarding low salt diet, exercise & weight control. VALVULAR HEART DISEASE:yes,   4/11/2023   Limited study due to patients body habitus. Left ventricular function and size is normal, EF is estimated at 55-60%. Moderate left ventricular hypertrophy. Diastolic dysfunction could not be evaluated due to arrhythmia. Severely dilated left atrium. Right side of the heart is mildly enlarged. Sclerotic, but non-stenotic aortic valve. Mild to Moderate mitral, moderate aortic and mild tricuspid regurgitation is   present. RVSP is 32 mmHg. Dilation of the aortic root(4.1cm) and the ascending aorta(3.8cm). No evidence of pericardial effusion. CAROTID ARTERY DISEASE:no  3/23/2023   No evidence of hemodynamically significant stenosis in the bilateral carotid    and vertebral arteries. ARRHYTHMIAS:yes,   Patient has H/O Atrial fibrillation  He is rate controlled & on anticoagulation. Takes Warfarin     CHRONIC VENOUS INSUFFICIENCY:yes, being managed with the help of compression stockings. SEDRICK: On C-pap. TESTS ORDERED: none this visit     PREVIOUSLY ORDERED TESTS REVIEWED & DISCUSSED WITH THE PATIENT:     I personally reviewed & interpreted, all previously ordered tests as copied above. Latest Labs are pulled in to the note with dates. Labs, specially in Reference to Lipid profile, Cardiac testing in the form of Echo ( dated: ), stress tests ( dated: ) & other relevant cardiac testing reviewed with patient & recommendations made based on assessment of the results. Discussed role of Cardiac risk factors & effects + treatment of co morbidities with patient & advised accordingly. MEDICATIONS: List of medications patient is currently taking is reviewed in detail with the patient.

## 2023-10-18 NOTE — PROGRESS NOTES
reduction. CORONARY ARTERY DISEASE:None known. HYPERTENSION:Yes  well controlled on current medical regimen.  - changes in  treatment:   no. Patient is on Amlodipine, HCTZ & Cozaar. Counseled regarding low salt diet, exercise & weight control. VALVULAR HEART DISEASE:yes,   4/11/2023   Limited study due to patients body habitus. Left ventricular function and size is normal, EF is estimated at 55-60%. Moderate left ventricular hypertrophy. Diastolic dysfunction could not be evaluated due to arrhythmia. Severely dilated left atrium. Right side of the heart is mildly enlarged. Sclerotic, but non-stenotic aortic valve. Mild to Moderate mitral, moderate aortic and mild tricuspid regurgitation is   present. RVSP is 32 mmHg. Dilation of the aortic root(4.1cm) and the ascending aorta(3.8cm). No evidence of pericardial effusion. CAROTID ARTERY DISEASE:no  3/23/2023   No evidence of hemodynamically significant stenosis in the bilateral carotid    and vertebral arteries. ARRHYTHMIAS:yes,   Patient has H/O Atrial fibrillation  He is rate controlled & on anticoagulation. Takes Warfarin     CHRONIC VENOUS INSUFFICIENCY:yes, being managed with the help of compression stockings. SEDRICK: On C-pap. TESTS ORDERED: none this visit     PREVIOUSLY ORDERED TESTS REVIEWED & DISCUSSED WITH THE PATIENT:     I personally reviewed & interpreted, all previously ordered tests as copied above. Latest Labs are pulled in to the note with dates. Labs, specially in Reference to Lipid profile, Cardiac testing in the form of Echo ( dated: ), stress tests ( dated: ) & other relevant cardiac testing reviewed with patient & recommendations made based on assessment of the results. Discussed role of Cardiac risk factors & effects + treatment of co morbidities with patient & advised accordingly.      MEDICATIONS: List of medications patient is currently taking is reviewed in detail with

## 2023-11-10 ENCOUNTER — OFFICE VISIT (OUTPATIENT)
Dept: FAMILY MEDICINE CLINIC | Age: 73
End: 2023-11-10

## 2023-11-10 VITALS
RESPIRATION RATE: 14 BRPM | DIASTOLIC BLOOD PRESSURE: 72 MMHG | BODY MASS INDEX: 40.43 KG/M2 | SYSTOLIC BLOOD PRESSURE: 136 MMHG | OXYGEN SATURATION: 97 % | HEIGHT: 74 IN | WEIGHT: 315 LBS | HEART RATE: 57 BPM

## 2023-11-10 DIAGNOSIS — E78.5 HYPERLIPIDEMIA, UNSPECIFIED HYPERLIPIDEMIA TYPE: ICD-10-CM

## 2023-11-10 DIAGNOSIS — I48.91 ATRIAL FIBRILLATION, UNSPECIFIED TYPE (HCC): ICD-10-CM

## 2023-11-10 DIAGNOSIS — R73.9 HYPERGLYCEMIA: ICD-10-CM

## 2023-11-10 DIAGNOSIS — G47.33 OBSTRUCTIVE SLEEP APNEA SYNDROME: ICD-10-CM

## 2023-11-10 DIAGNOSIS — E66.01 CLASS 3 SEVERE OBESITY DUE TO EXCESS CALORIES WITHOUT SERIOUS COMORBIDITY WITH BODY MASS INDEX (BMI) OF 40.0 TO 44.9 IN ADULT (HCC): ICD-10-CM

## 2023-11-10 DIAGNOSIS — I10 ESSENTIAL HYPERTENSION: ICD-10-CM

## 2023-11-10 DIAGNOSIS — M1A.09X0 IDIOPATHIC CHRONIC GOUT OF MULTIPLE SITES WITHOUT TOPHUS: ICD-10-CM

## 2023-11-10 DIAGNOSIS — J30.9 ALLERGIC RHINITIS, UNSPECIFIED SEASONALITY, UNSPECIFIED TRIGGER: Primary | ICD-10-CM

## 2023-11-10 LAB
ALBUMIN SERPL-MCNC: 4.2 G/DL (ref 3.4–5)
ALBUMIN/GLOB SERPL: 1.5 {RATIO} (ref 1.1–2.2)
ALP SERPL-CCNC: 59 U/L (ref 40–129)
ALT SERPL-CCNC: 19 U/L (ref 10–40)
ANION GAP SERPL CALCULATED.3IONS-SCNC: 11 MMOL/L (ref 3–16)
AST SERPL-CCNC: 26 U/L (ref 15–37)
BILIRUB SERPL-MCNC: 0.8 MG/DL (ref 0–1)
BUN SERPL-MCNC: 17 MG/DL (ref 7–20)
CALCIUM SERPL-MCNC: 9.3 MG/DL (ref 8.3–10.6)
CHLORIDE SERPL-SCNC: 103 MMOL/L (ref 99–110)
CO2 SERPL-SCNC: 27 MMOL/L (ref 21–32)
CREAT SERPL-MCNC: 0.9 MG/DL (ref 0.8–1.3)
GFR SERPLBLD CREATININE-BSD FMLA CKD-EPI: >60 ML/MIN/{1.73_M2}
GLUCOSE SERPL-MCNC: 136 MG/DL (ref 70–99)
INTERNATIONAL NORMALIZATION RATIO, POC: 2
POTASSIUM SERPL-SCNC: 4.3 MMOL/L (ref 3.5–5.1)
PROT SERPL-MCNC: 7 G/DL (ref 6.4–8.2)
PROTHROMBIN TIME, POC: 0
SODIUM SERPL-SCNC: 141 MMOL/L (ref 136–145)
URATE SERPL-MCNC: 8.8 MG/DL (ref 3.5–7.2)

## 2023-11-10 RX ORDER — LOSARTAN POTASSIUM 100 MG/1
100 TABLET ORAL DAILY
Qty: 90 TABLET | Refills: 1 | Status: SHIPPED | OUTPATIENT
Start: 2023-11-10

## 2023-11-10 RX ORDER — HYDROCHLOROTHIAZIDE 25 MG/1
25 TABLET ORAL DAILY
Qty: 90 TABLET | Refills: 1 | Status: SHIPPED | OUTPATIENT
Start: 2023-11-10

## 2023-11-10 RX ORDER — POTASSIUM CHLORIDE 20 MEQ/1
20 TABLET, EXTENDED RELEASE ORAL DAILY
Qty: 90 TABLET | Refills: 1 | Status: SHIPPED | OUTPATIENT
Start: 2023-11-10

## 2023-11-10 RX ORDER — AMLODIPINE BESYLATE 5 MG/1
5 TABLET ORAL DAILY
Qty: 90 TABLET | Refills: 1 | Status: SHIPPED | OUTPATIENT
Start: 2023-11-10

## 2023-11-10 RX ORDER — COLCHICINE 0.6 MG/1
TABLET ORAL
Qty: 25 TABLET | Refills: 1 | Status: SHIPPED | OUTPATIENT
Start: 2023-11-10

## 2023-11-10 RX ORDER — AZELASTINE HYDROCHLORIDE 0.5 MG/ML
1 SOLUTION/ DROPS OPHTHALMIC 2 TIMES DAILY
Qty: 1 EACH | Refills: 5 | Status: SHIPPED | OUTPATIENT
Start: 2023-11-10

## 2023-11-10 RX ORDER — WARFARIN SODIUM 2.5 MG/1
TABLET ORAL
Qty: 90 TABLET | Refills: 1 | Status: SHIPPED | OUTPATIENT
Start: 2023-11-10

## 2023-11-10 ASSESSMENT — ENCOUNTER SYMPTOMS
EYE PAIN: 0
VOMITING: 0
WHEEZING: 0
ABDOMINAL PAIN: 0
DIARRHEA: 0
TROUBLE SWALLOWING: 0
SHORTNESS OF BREATH: 0
CHEST TIGHTNESS: 0
APNEA: 1
NAUSEA: 0
BLOOD IN STOOL: 0

## 2023-11-10 NOTE — PROGRESS NOTES
Presenting for INR Check. INR: 2.0  Confirmed dose 2.5 mg every day, hold Mondays. Keep same dose. Recheck in 4 weeks.
Last Year: Never true   Transportation Needs: Unknown (5/10/2023)    PRAPARE - Transportation     Lack of Transportation (Non-Medical): No   Physical Activity: Insufficiently Active (5/25/2023)    Exercise Vital Sign     Days of Exercise per Week: 2 days     Minutes of Exercise per Session: 40 min   Housing Stability: Unknown (5/10/2023)    Housing Stability Vital Sign     Unstable Housing in the Last Year: No        SURGICAL HISTORY  Past Surgical History:   Procedure Laterality Date    APPENDECTOMY      CARDIOVERSION      KNEE ARTHROSCOPY      right knee                 CURRENT MEDICATIONS  Current Outpatient Medications   Medication Sig Dispense Refill    amLODIPine (NORVASC) 5 MG tablet Take 1 tablet by mouth daily 90 tablet 1    azelastine (OPTIVAR) 0.05 % ophthalmic solution Place 1 drop into both eyes 2 times daily 1 each 5    hydroCHLOROthiazide (HYDRODIURIL) 25 MG tablet Take 1 tablet by mouth daily 90 tablet 1    losartan (COZAAR) 100 MG tablet Take 1 tablet by mouth daily 90 tablet 1    potassium chloride (KLOR-CON M) 20 MEQ extended release tablet Take 1 tablet by mouth daily 90 tablet 1    warfarin (COUMADIN) 2.5 MG tablet TAKE ONE TABLET BY MOUTH ONCE DAILY IN THE EVENING EXCEPT ON MONDAYS, NO THERAPY 90 tablet 1    colchicine (COLCRYS) 0.6 MG tablet 1 tablet 2 to 3 times daily as needed for gout flare 25 tablet 1    UNABLE TO FIND Beet powder for circulation. Aspirin Buf,CaCarb-MgCarb-MgO, 81 MG TABS Take 1 tablet by mouth daily      fluticasone (FLONASE) 50 MCG/ACT nasal spray 2 sprays by Each Nostril route daily      Omega-3 1000 MG CAPS Take 1 capsule by mouth daily      Turmeric 500 MG TABS Take 1 tablet by mouth daily      vitamin D-3 (CHOLECALCIFEROL) 125 MCG (5000 UT) TABS Take 5,000 tablets by mouth daily       No current facility-administered medications for this visit.        ALLERGIES  No Known Allergies    PHYSICAL EXAM    /72 (Site: Right Upper Arm, Position: Sitting, Cuff Size:

## 2023-11-11 LAB
EST. AVERAGE GLUCOSE BLD GHB EST-MCNC: 148.5 MG/DL
HBA1C MFR BLD: 6.8 %

## 2023-12-11 ENCOUNTER — ANTI-COAG VISIT (OUTPATIENT)
Dept: FAMILY MEDICINE CLINIC | Age: 73
End: 2023-12-11

## 2023-12-11 ENCOUNTER — NURSE ONLY (OUTPATIENT)
Dept: FAMILY MEDICINE CLINIC | Age: 73
End: 2023-12-11
Payer: MEDICARE

## 2023-12-11 DIAGNOSIS — I48.11 LONGSTANDING PERSISTENT ATRIAL FIBRILLATION (HCC): Primary | ICD-10-CM

## 2023-12-11 DIAGNOSIS — I48.91 ATRIAL FIBRILLATION, UNSPECIFIED TYPE (HCC): ICD-10-CM

## 2023-12-11 LAB
INTERNATIONAL NORMALIZATION RATIO, POC: 2.6
PROTHROMBIN TIME, POC: 30.9

## 2023-12-11 PROCEDURE — 85610 PROTHROMBIN TIME: CPT | Performed by: FAMILY MEDICINE

## 2023-12-11 NOTE — PROGRESS NOTES
Presenting for INR Check. INR: 2.6    Confirmed dose 2.5 mg every day, hold Mondays. Keep same dose. Recheck in 4 weeks.

## 2024-01-08 ENCOUNTER — NURSE ONLY (OUTPATIENT)
Dept: FAMILY MEDICINE CLINIC | Age: 74
End: 2024-01-08
Payer: MEDICARE

## 2024-01-08 DIAGNOSIS — I48.91 ATRIAL FIBRILLATION, UNSPECIFIED TYPE (HCC): ICD-10-CM

## 2024-01-08 LAB
INTERNATIONAL NORMALIZATION RATIO, POC: 2.7
PROTHROMBIN TIME, POC: NORMAL

## 2024-01-08 PROCEDURE — 85610 PROTHROMBIN TIME: CPT | Performed by: FAMILY MEDICINE

## 2024-01-08 NOTE — PROGRESS NOTES
Presenting for INR Check. INR: 2.7    Confirmed dose 2.5 mg every day, hold Mondays.   Keep same dose.  Recheck in 4 weeks.

## 2024-02-05 ENCOUNTER — NURSE ONLY (OUTPATIENT)
Dept: FAMILY MEDICINE CLINIC | Age: 74
End: 2024-02-05
Payer: MEDICARE

## 2024-02-05 DIAGNOSIS — I48.91 ATRIAL FIBRILLATION, UNSPECIFIED TYPE (HCC): ICD-10-CM

## 2024-02-05 LAB
INTERNATIONAL NORMALIZATION RATIO, POC: 2.7
PROTHROMBIN TIME, POC: NORMAL

## 2024-02-05 PROCEDURE — 85610 PROTHROMBIN TIME: CPT | Performed by: FAMILY MEDICINE

## 2024-02-07 ENCOUNTER — APPOINTMENT (OUTPATIENT)
Dept: GENERAL RADIOLOGY | Age: 74
End: 2024-02-07
Payer: MEDICARE

## 2024-02-07 ENCOUNTER — HOSPITAL ENCOUNTER (EMERGENCY)
Age: 74
Discharge: HOME | End: 2024-02-07
Attending: EMERGENCY MEDICINE
Payer: MEDICARE

## 2024-02-07 VITALS
TEMPERATURE: 97.8 F | OXYGEN SATURATION: 99 % | HEART RATE: 54 BPM | HEIGHT: 72 IN | RESPIRATION RATE: 12 BRPM | BODY MASS INDEX: 42.66 KG/M2 | SYSTOLIC BLOOD PRESSURE: 121 MMHG | DIASTOLIC BLOOD PRESSURE: 79 MMHG | WEIGHT: 315 LBS

## 2024-02-07 DIAGNOSIS — R42 LIGHTHEADEDNESS: Primary | ICD-10-CM

## 2024-02-07 DIAGNOSIS — I48.91 ATRIAL FIBRILLATION, UNSPECIFIED TYPE (HCC): ICD-10-CM

## 2024-02-07 DIAGNOSIS — E86.0 DEHYDRATION: ICD-10-CM

## 2024-02-07 LAB
ANION GAP SERPL CALCULATED.3IONS-SCNC: 15 MMOL/L (ref 7–16)
BASOPHILS ABSOLUTE: 0 K/CU MM
BASOPHILS RELATIVE PERCENT: 0.4 % (ref 0–1)
BUN SERPL-MCNC: 17 MG/DL (ref 6–23)
CALCIUM SERPL-MCNC: 9.4 MG/DL (ref 8.3–10.6)
CHLORIDE BLD-SCNC: 100 MMOL/L (ref 99–110)
CO2: 25 MMOL/L (ref 21–32)
CREAT SERPL-MCNC: 0.8 MG/DL (ref 0.9–1.3)
DIFFERENTIAL TYPE: ABNORMAL
EKG ATRIAL RATE: 227 BPM
EKG DIAGNOSIS: NORMAL
EKG Q-T INTERVAL: 398 MS
EKG QRS DURATION: 100 MS
EKG QTC CALCULATION (BAZETT): 426 MS
EKG R AXIS: -48 DEGREES
EKG T AXIS: 25 DEGREES
EKG VENTRICULAR RATE: 69 BPM
EOSINOPHILS ABSOLUTE: 0.1 K/CU MM
EOSINOPHILS RELATIVE PERCENT: 0.7 % (ref 0–3)
GFR SERPL CREATININE-BSD FRML MDRD: >60 ML/MIN/1.73M2
GLUCOSE SERPL-MCNC: 134 MG/DL (ref 70–99)
HCT VFR BLD CALC: 50.6 % (ref 42–52)
HEMOGLOBIN: 16.9 GM/DL (ref 13.5–18)
IMMATURE NEUTROPHIL %: 0.3 % (ref 0–0.43)
INR BLD: 1.9 INDEX
LYMPHOCYTES ABSOLUTE: 2 K/CU MM
LYMPHOCYTES RELATIVE PERCENT: 28.2 % (ref 24–44)
MCH RBC QN AUTO: 31.1 PG (ref 27–31)
MCHC RBC AUTO-ENTMCNC: 33.4 % (ref 32–36)
MCV RBC AUTO: 93 FL (ref 78–100)
MONOCYTES ABSOLUTE: 0.6 K/CU MM
MONOCYTES RELATIVE PERCENT: 8 % (ref 0–4)
PDW BLD-RTO: 13.2 % (ref 11.7–14.9)
PLATELET # BLD: 258 K/CU MM (ref 140–440)
PMV BLD AUTO: 9.8 FL (ref 7.5–11.1)
POTASSIUM SERPL-SCNC: 4.2 MMOL/L (ref 3.5–5.1)
PROTHROMBIN TIME: 22.9 SECONDS (ref 11.7–14.5)
RBC # BLD: 5.44 M/CU MM (ref 4.6–6.2)
REASON FOR REJECTION: NORMAL
REJECTED TEST: NORMAL
SEGMENTED NEUTROPHILS ABSOLUTE COUNT: 4.4 K/CU MM
SEGMENTED NEUTROPHILS RELATIVE PERCENT: 62.4 % (ref 36–66)
SODIUM BLD-SCNC: 140 MMOL/L (ref 135–145)
TOTAL IMMATURE NEUTOROPHIL: 0.02 K/CU MM
TROPONIN, HIGH SENSITIVITY: 17 NG/L (ref 0–22)
WBC # BLD: 7.1 K/CU MM (ref 4–10.5)

## 2024-02-07 PROCEDURE — 93005 ELECTROCARDIOGRAM TRACING: CPT | Performed by: EMERGENCY MEDICINE

## 2024-02-07 PROCEDURE — 84484 ASSAY OF TROPONIN QUANT: CPT

## 2024-02-07 PROCEDURE — 96360 HYDRATION IV INFUSION INIT: CPT

## 2024-02-07 PROCEDURE — 85610 PROTHROMBIN TIME: CPT

## 2024-02-07 PROCEDURE — 71045 X-RAY EXAM CHEST 1 VIEW: CPT

## 2024-02-07 PROCEDURE — 99285 EMERGENCY DEPT VISIT HI MDM: CPT

## 2024-02-07 PROCEDURE — 93010 ELECTROCARDIOGRAM REPORT: CPT | Performed by: INTERNAL MEDICINE

## 2024-02-07 PROCEDURE — 85025 COMPLETE CBC W/AUTO DIFF WBC: CPT

## 2024-02-07 PROCEDURE — 2580000003 HC RX 258: Performed by: EMERGENCY MEDICINE

## 2024-02-07 PROCEDURE — 80048 BASIC METABOLIC PNL TOTAL CA: CPT

## 2024-02-07 RX ORDER — 0.9 % SODIUM CHLORIDE 0.9 %
500 INTRAVENOUS SOLUTION INTRAVENOUS ONCE
Status: COMPLETED | OUTPATIENT
Start: 2024-02-07 | End: 2024-02-07

## 2024-02-07 RX ADMIN — SODIUM CHLORIDE 500 ML: 9 INJECTION, SOLUTION INTRAVENOUS at 11:08

## 2024-02-07 ASSESSMENT — PAIN - FUNCTIONAL ASSESSMENT: PAIN_FUNCTIONAL_ASSESSMENT: NONE - DENIES PAIN

## 2024-02-07 NOTE — DISCHARGE INSTRUCTIONS
Return immediately to the emergency department if you experience new or worsened symptoms, weakness, changes in sensation, spinning sensation, or for any other concerns.

## 2024-02-07 NOTE — ED PROVIDER NOTES
Emergency Department Encounter    Patient: Hay Boyer  MRN: [id number]  : 1950  Date of Evaluation: 2024  ED Provider:  Thompson San MD    MDM:    Clinical Impression:  1. Lightheadedness    2. Dehydration    3. Atrial fibrillation, unspecified type (HCC)          Triage Chief Complaint: Dizziness (Reports feeling shaky and of balance in am reports. Since yesterday am. Reports hx of vertigo and A fib.)        I completed a structured, evidence-based clinical evaluation to screen for acute emergent condition that poses a threat to life or bodily function.      Diagnostic studies/Differential diagnosis included: (with independent interpretations \"as interpreted by me\" and tests considered but not performed) presentation is most consistent with orthopnea and patient was treated with 500 cc of normal saline with resolution of symptoms.  Patient had no vertiginous symptoms I do not suggest central etiology to the patient's symptoms.  Patient has no focal neurologic deficits to suggest TIA or CVA.  No leukocytosis or anemia.  No significant electrolyte abnormalities or renal insufficiency.  Glucose 134.  EKG and troponin do not suggest evidence of ACS or malignant arrhythmia.  Chest x-ray as interpreted by me without evidence of pneumonia, pneumothorax, aortic dissection, acute rib fracture.  Patient is comfortable returning home.    I considered the following moderate comorbidities in the care of this patient:   Patient  has a past medical history of Allergic rhinitis, Atrial fibrillation (HCC), Cancer (HCC), Cardiomyopathy (HCC), Hilar lymphadenopathy, Hyperlipidemia, Hypertension, Impotence, Obstructive sleep apnea syndrome, Peripheral venous insufficiency, and Sleep apnea.     Medications ordered in the ED:  ED Medication Orders (From admission, onward)      Start Ordered     Status Ordering Provider    24 1115 24 1100  sodium chloride 0.9 % bolus 500 mL  ONCE         Last MAR action: Stopped

## 2024-03-04 ENCOUNTER — NURSE ONLY (OUTPATIENT)
Dept: FAMILY MEDICINE CLINIC | Age: 74
End: 2024-03-04
Payer: MEDICARE

## 2024-03-04 DIAGNOSIS — I48.91 ATRIAL FIBRILLATION, UNSPECIFIED TYPE (HCC): ICD-10-CM

## 2024-03-04 LAB
INTERNATIONAL NORMALIZATION RATIO, POC: 3.1
PROTHROMBIN TIME, POC: NORMAL

## 2024-03-04 PROCEDURE — 85610 PROTHROMBIN TIME: CPT | Performed by: FAMILY MEDICINE

## 2024-03-04 NOTE — PROGRESS NOTES
Presenting for INR Check. INR: 3.1    Confirmed dose 2.5 mg every day, hold Mondays.   Keep same dose per Brea  Recheck in 4 weeks.

## 2024-03-06 ENCOUNTER — INITIAL CONSULT (OUTPATIENT)
Dept: ONCOLOGY | Age: 74
End: 2024-03-06
Payer: MEDICARE

## 2024-03-06 ENCOUNTER — HOSPITAL ENCOUNTER (OUTPATIENT)
Dept: INFUSION THERAPY | Age: 74
Discharge: HOME | End: 2024-03-06
Payer: MEDICARE

## 2024-03-06 VITALS
DIASTOLIC BLOOD PRESSURE: 87 MMHG | SYSTOLIC BLOOD PRESSURE: 171 MMHG | BODY MASS INDEX: 42.66 KG/M2 | WEIGHT: 315 LBS | HEART RATE: 60 BPM | OXYGEN SATURATION: 97 % | HEIGHT: 72 IN | TEMPERATURE: 97.5 F

## 2024-03-06 DIAGNOSIS — C44.92 SQUAMOUS CELL SKIN CANCER: Primary | ICD-10-CM

## 2024-03-06 PROBLEM — E11.9 TYPE 2 DIABETES MELLITUS (HCC): Status: ACTIVE | Noted: 2024-03-06

## 2024-03-06 PROCEDURE — 1123F ACP DISCUSS/DSCN MKR DOCD: CPT | Performed by: INTERNAL MEDICINE

## 2024-03-06 PROCEDURE — 99211 OFF/OP EST MAY X REQ PHY/QHP: CPT

## 2024-03-06 PROCEDURE — 99204 OFFICE O/P NEW MOD 45 MIN: CPT | Performed by: INTERNAL MEDICINE

## 2024-03-06 NOTE — PROGRESS NOTES
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      Encounter Diagnosis   Name Primary?    Squamous cell skin cancer Yes        HPI:   3/6/2024, he arrived alone to the clinic today.  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  Not able to completely excised secondary to nerve sheath involvement.  He received adjuvant radiation therapy.  He then started noticing a bump just adjacent to the radiation area.  No pain no bleeding.  Crusting appearance.  Biopsy consistent with squamous cell cancer.  He underwent deep excision, looks like clear margins.  He was sent here to see if he needs further evaluation.  He denies any lymphadenopathy.  Denies any weight loss.  Denies any chest pain, abdominal pain.     Past Medical History:     A-fib, hypertension, hyperlipidemia, gout                                                             Past Surgery History:    Right knee arthroscopy twice, intussusception surgery                                                                       Social History:   Lives with wife, no history of smoking tobacco, alcohol abuse or any other illicit drug abuse                                                                                                        Family History:    Brother diagnosed with melanoma and  at the age of 40.  Brother son with malignancy in the brain, brother's daughter with diagnosed with melanoma twice.                                                                                                No Known Allergies    Current Outpatient Medications on File Prior to Visit   Medication Sig Dispense Refill    amLODIPine (NORVASC) 5 MG

## 2024-03-06 NOTE — PROGRESS NOTES
MA Rooming Questions  Patient: Hay Boyer  MRN: [id number]    Date: 3/6/2024        New Patient        5. Did the patient have a depression screening completed today? No    No data recorded     PHQ-9 Given to (if applicable):               PHQ-9 Score (if applicable):                     [] Positive     []  Negative              Does question #9 need addressed (if applicable)                     [] Yes    []  No               Dana Borjas MA

## 2024-03-12 ENCOUNTER — CLINICAL DOCUMENTATION (OUTPATIENT)
Dept: ONCOLOGY | Age: 74
End: 2024-03-12

## 2024-03-12 NOTE — PROGRESS NOTES
Physician requesting direct contact information for Evie Escalona DO. This RN called [facility] @ [phone] to notify. Informed that  is not in office until Thursday 03/14/2024 and advised to call back since they cannot provide contact information; therefore, this RN provided [facility]  with Dr. Chaparro's direct number for  to call. Patient information also provided. RN voices understanding and will relay message.

## 2024-04-01 ENCOUNTER — ANTI-COAG VISIT (OUTPATIENT)
Dept: FAMILY MEDICINE CLINIC | Age: 74
End: 2024-04-01

## 2024-04-01 ENCOUNTER — NURSE ONLY (OUTPATIENT)
Dept: FAMILY MEDICINE CLINIC | Age: 74
End: 2024-04-01
Payer: MEDICARE

## 2024-04-01 DIAGNOSIS — I48.11 LONGSTANDING PERSISTENT ATRIAL FIBRILLATION (HCC): Primary | ICD-10-CM

## 2024-04-01 DIAGNOSIS — I48.91 ATRIAL FIBRILLATION, UNSPECIFIED TYPE (HCC): ICD-10-CM

## 2024-04-01 LAB
INTERNATIONAL NORMALIZATION RATIO, POC: 2.9
PROTHROMBIN TIME, POC: 34.4

## 2024-04-01 PROCEDURE — 85610 PROTHROMBIN TIME: CPT | Performed by: FAMILY MEDICINE

## 2024-04-01 NOTE — PROGRESS NOTES
Presenting for INR Check. INR: 2.1    Confirmed dose 2.5 mg every day, hold Mondays.   Recheck in 4 weeks.

## 2024-04-11 ENCOUNTER — CLINICAL DOCUMENTATION (OUTPATIENT)
Dept: ONCOLOGY | Age: 74
End: 2024-04-11

## 2024-04-15 RX ORDER — COLCHICINE 0.6 MG/1
TABLET ORAL
Qty: 25 TABLET | Refills: 1 | Status: SHIPPED | OUTPATIENT
Start: 2024-04-15

## 2024-04-15 NOTE — TELEPHONE ENCOUNTER
LV   11/10/23    NV  5/10/24      [facility]

## 2024-04-23 ENCOUNTER — OFFICE VISIT (OUTPATIENT)
Dept: CARDIOLOGY CLINIC | Age: 74
End: 2024-04-23
Payer: MEDICARE

## 2024-04-23 VITALS
DIASTOLIC BLOOD PRESSURE: 86 MMHG | SYSTOLIC BLOOD PRESSURE: 134 MMHG | WEIGHT: 315 LBS | HEIGHT: 74 IN | RESPIRATION RATE: 16 BRPM | HEART RATE: 69 BPM | OXYGEN SATURATION: 98 % | BODY MASS INDEX: 40.43 KG/M2

## 2024-04-23 DIAGNOSIS — G47.33 OBSTRUCTIVE SLEEP APNEA SYNDROME: ICD-10-CM

## 2024-04-23 DIAGNOSIS — I42.9 CARDIOMYOPATHY, UNSPECIFIED TYPE (HCC): ICD-10-CM

## 2024-04-23 DIAGNOSIS — I48.11 LONGSTANDING PERSISTENT ATRIAL FIBRILLATION (HCC): Primary | ICD-10-CM

## 2024-04-23 DIAGNOSIS — E66.01 CLASS 3 SEVERE OBESITY DUE TO EXCESS CALORIES WITHOUT SERIOUS COMORBIDITY WITH BODY MASS INDEX (BMI) OF 40.0 TO 44.9 IN ADULT (HCC): ICD-10-CM

## 2024-04-23 DIAGNOSIS — E78.5 HYPERLIPIDEMIA, UNSPECIFIED HYPERLIPIDEMIA TYPE: ICD-10-CM

## 2024-04-23 PROCEDURE — 99213 OFFICE O/P EST LOW 20 MIN: CPT | Performed by: INTERNAL MEDICINE

## 2024-04-23 PROCEDURE — 1123F ACP DISCUSS/DSCN MKR DOCD: CPT | Performed by: INTERNAL MEDICINE

## 2024-04-23 NOTE — PROGRESS NOTES
morbidities with patient & advised accordingly.     MEDICATIONS: List of medications patient is currently taking is reviewed in detail with the patient. Discussed any side effects or problems taking the medication.     Recommend Continue present management & medications as listed.     AFFIRMATION: I spent at least 20 minutes of time reviewing patient's history, previous & current medical problems & all Labs + testing. This includes chart prep even prior to the vosit. Various goals are discussed and multiple questions answered.Relevant concelling performed.     Office follow up in six months.

## 2024-04-29 ENCOUNTER — ANTI-COAG VISIT (OUTPATIENT)
Dept: FAMILY MEDICINE CLINIC | Age: 74
End: 2024-04-29

## 2024-05-01 DIAGNOSIS — I10 ESSENTIAL HYPERTENSION: ICD-10-CM

## 2024-05-01 RX ORDER — HYDROCHLOROTHIAZIDE 25 MG/1
25 TABLET ORAL DAILY
Qty: 90 TABLET | Refills: 1 | Status: SHIPPED | OUTPATIENT
Start: 2024-05-01

## 2024-05-01 RX ORDER — LOSARTAN POTASSIUM 100 MG/1
100 TABLET ORAL DAILY
Qty: 90 TABLET | Refills: 1 | Status: SHIPPED | OUTPATIENT
Start: 2024-05-01

## 2024-05-01 RX ORDER — AMLODIPINE BESYLATE 5 MG/1
5 TABLET ORAL DAILY
Qty: 90 TABLET | Refills: 1 | Status: SHIPPED | OUTPATIENT
Start: 2024-05-01

## 2024-05-01 RX ORDER — POTASSIUM CHLORIDE 20 MEQ/1
20 TABLET, EXTENDED RELEASE ORAL DAILY
Qty: 90 TABLET | Refills: 1 | Status: SHIPPED | OUTPATIENT
Start: 2024-05-01

## 2024-05-10 ENCOUNTER — OFFICE VISIT (OUTPATIENT)
Dept: FAMILY MEDICINE CLINIC | Age: 74
End: 2024-05-10

## 2024-05-10 VITALS
RESPIRATION RATE: 20 BRPM | SYSTOLIC BLOOD PRESSURE: 110 MMHG | OXYGEN SATURATION: 97 % | WEIGHT: 315 LBS | HEIGHT: 74 IN | HEART RATE: 50 BPM | DIASTOLIC BLOOD PRESSURE: 70 MMHG | BODY MASS INDEX: 40.43 KG/M2

## 2024-05-10 DIAGNOSIS — I42.9 CARDIOMYOPATHY, UNSPECIFIED TYPE (HCC): Primary | ICD-10-CM

## 2024-05-10 DIAGNOSIS — M1A.09X0 IDIOPATHIC CHRONIC GOUT OF MULTIPLE SITES WITHOUT TOPHUS: ICD-10-CM

## 2024-05-10 DIAGNOSIS — I48.91 ATRIAL FIBRILLATION, UNSPECIFIED TYPE (HCC): ICD-10-CM

## 2024-05-10 DIAGNOSIS — E78.5 HYPERLIPIDEMIA, UNSPECIFIED HYPERLIPIDEMIA TYPE: ICD-10-CM

## 2024-05-10 DIAGNOSIS — E11.9 TYPE 2 DIABETES MELLITUS WITHOUT COMPLICATION, WITHOUT LONG-TERM CURRENT USE OF INSULIN (HCC): ICD-10-CM

## 2024-05-10 DIAGNOSIS — G47.33 OBSTRUCTIVE SLEEP APNEA SYNDROME: ICD-10-CM

## 2024-05-10 DIAGNOSIS — E66.01 CLASS 3 SEVERE OBESITY DUE TO EXCESS CALORIES WITHOUT SERIOUS COMORBIDITY WITH BODY MASS INDEX (BMI) OF 40.0 TO 44.9 IN ADULT (HCC): ICD-10-CM

## 2024-05-10 DIAGNOSIS — M15.9 PRIMARY OSTEOARTHRITIS INVOLVING MULTIPLE JOINTS: ICD-10-CM

## 2024-05-10 PROBLEM — M15.0 PRIMARY OSTEOARTHRITIS INVOLVING MULTIPLE JOINTS: Status: ACTIVE | Noted: 2024-05-10

## 2024-05-10 LAB
ALBUMIN SERPL-MCNC: 4.4 G/DL (ref 3.4–5)
ALBUMIN/GLOB SERPL: 1.4 {RATIO} (ref 1.1–2.2)
ALP SERPL-CCNC: 63 U/L (ref 40–129)
ALT SERPL-CCNC: 19 U/L (ref 10–40)
ANION GAP SERPL CALCULATED.3IONS-SCNC: 18 MMOL/L (ref 3–16)
AST SERPL-CCNC: 35 U/L (ref 15–37)
BILIRUB SERPL-MCNC: 1.6 MG/DL (ref 0–1)
BUN SERPL-MCNC: 20 MG/DL (ref 7–20)
CALCIUM SERPL-MCNC: 9.5 MG/DL (ref 8.3–10.6)
CHLORIDE SERPL-SCNC: 96 MMOL/L (ref 99–110)
CHOLEST SERPL-MCNC: 184 MG/DL (ref 0–199)
CO2 SERPL-SCNC: 22 MMOL/L (ref 21–32)
CREAT SERPL-MCNC: 0.8 MG/DL (ref 0.8–1.3)
CREAT UR-MCNC: 86.9 MG/DL (ref 39–259)
DEPRECATED RDW RBC AUTO: 13.8 % (ref 12.4–15.4)
GFR SERPLBLD CREATININE-BSD FMLA CKD-EPI: >90 ML/MIN/{1.73_M2}
GLUCOSE SERPL-MCNC: 100 MG/DL (ref 70–99)
HCT VFR BLD AUTO: 50.9 % (ref 40.5–52.5)
HDLC SERPL-MCNC: 45 MG/DL (ref 40–60)
HGB BLD-MCNC: 17.2 G/DL (ref 13.5–17.5)
INTERNATIONAL NORMALIZATION RATIO, POC: 2.1
LDLC SERPL CALC-MCNC: 113 MG/DL
MCH RBC QN AUTO: 31.4 PG (ref 26–34)
MCHC RBC AUTO-ENTMCNC: 33.9 G/DL (ref 31–36)
MCV RBC AUTO: 92.8 FL (ref 80–100)
MICROALBUMIN UR DL<=1MG/L-MCNC: <1.2 MG/DL
MICROALBUMIN/CREAT UR: NORMAL MG/G (ref 0–30)
PLATELET # BLD AUTO: 236 K/UL (ref 135–450)
PMV BLD AUTO: 8.8 FL (ref 5–10.5)
POTASSIUM SERPL-SCNC: 5.1 MMOL/L (ref 3.5–5.1)
PROT SERPL-MCNC: 7.6 G/DL (ref 6.4–8.2)
PROTHROMBIN TIME, POC: 0
RBC # BLD AUTO: 5.48 M/UL (ref 4.2–5.9)
SODIUM SERPL-SCNC: 136 MMOL/L (ref 136–145)
TRIGL SERPL-MCNC: 129 MG/DL (ref 0–150)
URATE SERPL-MCNC: 9.6 MG/DL (ref 3.5–7.2)
VLDLC SERPL CALC-MCNC: 26 MG/DL
WBC # BLD AUTO: 6.4 K/UL (ref 4–11)

## 2024-05-10 SDOH — ECONOMIC STABILITY: FOOD INSECURITY: WITHIN THE PAST 12 MONTHS, THE FOOD YOU BOUGHT JUST DIDN'T LAST AND YOU DIDN'T HAVE MONEY TO GET MORE.: NEVER TRUE

## 2024-05-10 SDOH — ECONOMIC STABILITY: FOOD INSECURITY: WITHIN THE PAST 12 MONTHS, YOU WORRIED THAT YOUR FOOD WOULD RUN OUT BEFORE YOU GOT MONEY TO BUY MORE.: NEVER TRUE

## 2024-05-10 SDOH — ECONOMIC STABILITY: INCOME INSECURITY: HOW HARD IS IT FOR YOU TO PAY FOR THE VERY BASICS LIKE FOOD, HOUSING, MEDICAL CARE, AND HEATING?: NOT HARD AT ALL

## 2024-05-10 ASSESSMENT — PATIENT HEALTH QUESTIONNAIRE - PHQ9
SUM OF ALL RESPONSES TO PHQ QUESTIONS 1-9: 0
SUM OF ALL RESPONSES TO PHQ QUESTIONS 1-9: 0
2. FEELING DOWN, DEPRESSED OR HOPELESS: NOT AT ALL
SUM OF ALL RESPONSES TO PHQ9 QUESTIONS 1 & 2: 0
SUM OF ALL RESPONSES TO PHQ QUESTIONS 1-9: 0
SUM OF ALL RESPONSES TO PHQ QUESTIONS 1-9: 0
1. LITTLE INTEREST OR PLEASURE IN DOING THINGS: NOT AT ALL

## 2024-05-10 ASSESSMENT — ENCOUNTER SYMPTOMS
ABDOMINAL PAIN: 0
SHORTNESS OF BREATH: 0
CHEST TIGHTNESS: 0
VOMITING: 0
BLOOD IN STOOL: 0
TROUBLE SWALLOWING: 0
DIARRHEA: 0
NAUSEA: 0
APNEA: 1
WHEEZING: 0
EYE PAIN: 0

## 2024-05-10 NOTE — PROGRESS NOTES
5/10/2024    Hay Boyer    Chief Complaint   Patient presents with    6 Month Follow-Up    Office Visit for Anticoagulation Management    Dizziness     Was in ER in feb for this. Was told he was dehydrated.     Knee Pain     R knee has been locking up on him        HPI  History was obtained from the patient.  Hay is a 73 y.o. male who presents today with routine recheck.  Patient with A-fib and on Coumadin with rate control.  History of mild cardiomyopathy, allergies, obesity, sleep apnea, diabetes mellitus type 2, and gout.  Has had no gout flare.  Tries to be compliant with CPAP.  Med list updated no refills needed at this point.    Overall seeing he is enjoying care home trying to stay active.  Has been compliant with medicines on review does need lab work today.  Patient is having some increasing right knee pain has seen Ortho for same.  INR today was 2.1 we will keep Coumadin dose the same and recheck 3 to 4 weeks.    REVIEW OF SYMPTOMS    Review of Systems   Constitutional:  Negative for activity change and fatigue.        Patient with long history of obesity BMI currently 43.7.   HENT:  Negative for congestion, hearing loss, mouth sores and trouble swallowing.    Eyes:  Negative for pain and visual disturbance.   Respiratory:  Positive for apnea. Negative for chest tightness, shortness of breath and wheezing.    Cardiovascular:  Positive for palpitations and leg swelling. Negative for chest pain.        Patient with known cardiomyopathy as well as A-fib   Gastrointestinal:  Negative for abdominal pain, blood in stool, diarrhea, nausea and vomiting.   Endocrine: Negative for cold intolerance.        Patient with diabetes mellitus type 2   Genitourinary:  Negative for dysuria, frequency and urgency.   Musculoskeletal:  Positive for arthralgias. Negative for gait problem and neck stiffness.   Skin:  Negative for rash.   Allergic/Immunologic: Positive for environmental allergies.   Neurological:

## 2024-05-11 LAB
EST. AVERAGE GLUCOSE BLD GHB EST-MCNC: 137 MG/DL
HBA1C MFR BLD: 6.4 %

## 2024-06-12 ENCOUNTER — NURSE ONLY (OUTPATIENT)
Dept: FAMILY MEDICINE CLINIC | Age: 74
End: 2024-06-12
Payer: MEDICARE

## 2024-06-12 ENCOUNTER — ANTI-COAG VISIT (OUTPATIENT)
Dept: FAMILY MEDICINE CLINIC | Age: 74
End: 2024-06-12

## 2024-06-12 DIAGNOSIS — I48.91 ATRIAL FIBRILLATION, UNSPECIFIED TYPE (HCC): ICD-10-CM

## 2024-06-12 DIAGNOSIS — I48.11 LONGSTANDING PERSISTENT ATRIAL FIBRILLATION (HCC): Primary | ICD-10-CM

## 2024-06-12 LAB
INTERNATIONAL NORMALIZATION RATIO, POC: 2.7
PROTHROMBIN TIME, POC: 31.8

## 2024-06-12 PROCEDURE — 85610 PROTHROMBIN TIME: CPT | Performed by: FAMILY MEDICINE

## 2024-07-12 ENCOUNTER — NURSE ONLY (OUTPATIENT)
Dept: FAMILY MEDICINE CLINIC | Age: 74
End: 2024-07-12

## 2024-07-12 DIAGNOSIS — I48.0 PAROXYSMAL ATRIAL FIBRILLATION (HCC): Primary | ICD-10-CM

## 2024-07-12 LAB — INTERNATIONAL NORMALIZATION RATIO, POC: 2.2

## 2024-07-12 NOTE — PROGRESS NOTES
Presenting for INR Check. INR: 2.2    Confirmed dose 2.5 mg every day, hold Mondays.   Recheck in 4 weeks.

## 2024-08-09 ENCOUNTER — NURSE ONLY (OUTPATIENT)
Dept: FAMILY MEDICINE CLINIC | Age: 74
End: 2024-08-09

## 2024-08-09 DIAGNOSIS — I48.91 ATRIAL FIBRILLATION, UNSPECIFIED TYPE (HCC): Primary | ICD-10-CM

## 2024-08-09 LAB — INTERNATIONAL NORMALIZATION RATIO, POC: 2.8

## 2024-08-09 NOTE — PROGRESS NOTES
Presenting for INR Check. INR: 2.8    Confirmed dose 2.5 mg every day, hold Mondays.   Recheck in 4 weeks.

## 2024-08-12 ENCOUNTER — TELEPHONE (OUTPATIENT)
Dept: FAMILY MEDICINE CLINIC | Age: 74
End: 2024-08-12

## 2024-08-12 DIAGNOSIS — G47.33 OBSTRUCTIVE SLEEP APNEA SYNDROME: Primary | ICD-10-CM

## 2024-08-12 NOTE — TELEPHONE ENCOUNTER
Patient called in stating he needs a new order for his cpap machine and supplies faxed to [employer] at [phone]. Says his current one is going out on him.

## 2024-08-12 NOTE — TELEPHONE ENCOUNTER
Size Of Lesion: 0.35x0.35 Yes-thanks.   Size Of Lesion: 0.45x0.35cm Size Of Lesion: 0.25x0.15 Detail Level: Detailed Size Of Lesion: 0.3x0.25cm

## 2024-08-15 ENCOUNTER — TELEPHONE (OUTPATIENT)
Dept: FAMILY MEDICINE CLINIC | Age: 74
End: 2024-08-15

## 2024-08-15 NOTE — TELEPHONE ENCOUNTER
Spoke to patient regarding fax from [facility] for cpap. He states he will contact them regarding pressure setting. Office note faxed to them.

## 2024-08-16 NOTE — TELEPHONE ENCOUNTER
[facility] needs note stating patient is compatible with cpap machine and pressure setting is 12. Can you addend May office note or should I write a note stating this?

## 2024-09-06 ENCOUNTER — NURSE ONLY (OUTPATIENT)
Dept: FAMILY MEDICINE CLINIC | Age: 74
End: 2024-09-06
Payer: MEDICARE

## 2024-09-06 DIAGNOSIS — I48.91 ATRIAL FIBRILLATION, UNSPECIFIED TYPE (HCC): ICD-10-CM

## 2024-09-06 LAB
INTERNATIONAL NORMALIZATION RATIO, POC: 3
PROTHROMBIN TIME, POC: 36.1

## 2024-09-06 PROCEDURE — 85610 PROTHROMBIN TIME: CPT | Performed by: FAMILY MEDICINE

## 2024-10-04 ENCOUNTER — NURSE ONLY (OUTPATIENT)
Dept: FAMILY MEDICINE CLINIC | Age: 74
End: 2024-10-04

## 2024-10-04 DIAGNOSIS — I48.91 ATRIAL FIBRILLATION, UNSPECIFIED TYPE (HCC): ICD-10-CM

## 2024-10-04 DIAGNOSIS — I10 ESSENTIAL HYPERTENSION: ICD-10-CM

## 2024-10-04 LAB — INTERNATIONAL NORMALIZATION RATIO, POC: 2.9

## 2024-10-04 RX ORDER — LOSARTAN POTASSIUM 100 MG/1
100 TABLET ORAL DAILY
Qty: 90 TABLET | Refills: 1 | Status: SHIPPED | OUTPATIENT
Start: 2024-10-04

## 2024-10-04 RX ORDER — LOSARTAN POTASSIUM 100 MG/1
100 TABLET ORAL DAILY
Qty: 90 TABLET | Refills: 1 | Status: CANCELLED | OUTPATIENT
Start: 2024-10-04

## 2024-10-04 RX ORDER — AMLODIPINE BESYLATE 5 MG/1
5 TABLET ORAL DAILY
Qty: 90 TABLET | Refills: 1 | Status: CANCELLED | OUTPATIENT
Start: 2024-10-04

## 2024-10-04 RX ORDER — WARFARIN SODIUM 2.5 MG/1
TABLET ORAL
Qty: 90 TABLET | Refills: 1 | Status: SHIPPED | OUTPATIENT
Start: 2024-10-04

## 2024-10-04 RX ORDER — AMLODIPINE BESYLATE 5 MG/1
5 TABLET ORAL DAILY
Qty: 90 TABLET | Refills: 1 | Status: SHIPPED | OUTPATIENT
Start: 2024-10-04

## 2024-10-04 RX ORDER — HYDROCHLOROTHIAZIDE 25 MG/1
25 TABLET ORAL DAILY
Qty: 90 TABLET | Refills: 1 | Status: SHIPPED | OUTPATIENT
Start: 2024-10-04

## 2024-10-04 RX ORDER — WARFARIN SODIUM 2.5 MG/1
TABLET ORAL
Qty: 90 TABLET | Refills: 1 | Status: CANCELLED | OUTPATIENT
Start: 2024-10-04

## 2024-10-04 RX ORDER — HYDROCHLOROTHIAZIDE 25 MG/1
25 TABLET ORAL DAILY
Qty: 90 TABLET | Refills: 1 | Status: CANCELLED | OUTPATIENT
Start: 2024-10-04

## 2024-10-04 RX ORDER — POTASSIUM CHLORIDE 1500 MG/1
20 TABLET, EXTENDED RELEASE ORAL DAILY
Qty: 90 TABLET | Refills: 1 | Status: CANCELLED | OUTPATIENT
Start: 2024-10-04

## 2024-10-04 RX ORDER — POTASSIUM CHLORIDE 1500 MG/1
20 TABLET, EXTENDED RELEASE ORAL DAILY
Qty: 90 TABLET | Refills: 1 | Status: SHIPPED | OUTPATIENT
Start: 2024-10-04

## 2024-10-04 NOTE — TELEPHONE ENCOUNTER
Patient needs these refilled early due to going on vacation for three weeks.   Warfarin - he has 1 pill left!

## 2024-10-28 ENCOUNTER — OFFICE VISIT (OUTPATIENT)
Dept: FAMILY MEDICINE CLINIC | Age: 74
End: 2024-10-28

## 2024-10-28 VITALS
TEMPERATURE: 99.7 F | WEIGHT: 315 LBS | BODY MASS INDEX: 45.09 KG/M2 | HEART RATE: 87 BPM | SYSTOLIC BLOOD PRESSURE: 148 MMHG | DIASTOLIC BLOOD PRESSURE: 90 MMHG | OXYGEN SATURATION: 99 %

## 2024-10-28 DIAGNOSIS — M25.421 PAIN AND SWELLING OF RIGHT ELBOW: Primary | ICD-10-CM

## 2024-10-28 DIAGNOSIS — M25.521 PAIN AND SWELLING OF RIGHT ELBOW: Primary | ICD-10-CM

## 2024-10-28 RX ORDER — PREDNISONE 20 MG/1
40 TABLET ORAL DAILY
Qty: 10 TABLET | Refills: 0 | Status: SHIPPED | OUTPATIENT
Start: 2024-10-28 | End: 2024-11-02

## 2024-10-28 ASSESSMENT — ENCOUNTER SYMPTOMS
COUGH: 0
NAUSEA: 0
SORE THROAT: 0
CHEST TIGHTNESS: 0
WHEEZING: 0
VOMITING: 0
SINUS PRESSURE: 0
SINUS PAIN: 0
SHORTNESS OF BREATH: 0
DIARRHEA: 0
RHINORRHEA: 0

## 2024-10-28 NOTE — PROGRESS NOTES
Hay Boyer   74 y.o.  male  [id number]      Chief Complaint   Patient presents with    Elbow Injury     Right elbow pain- ongoing since saturday        Subjective:  74 y.o.male is here for a follow up. He has the following chronic/acute medical problems:  Patient Active Problem List   Diagnosis    Hyperlipidemia    Allergic rhinitis    Peripheral venous insufficiency    Cardiomyopathy (HCC)    Impotence    Atrial fibrillation (HCC)    Obstructive sleep apnea syndrome    Hilar lymphadenopathy    Idiopathic chronic gout of multiple sites without tophus    Class 3 severe obesity due to excess calories without serious comorbidity with body mass index (BMI) of 40.0 to 44.9 in adult    Hyperglycemia    Type 2 diabetes mellitus    Primary osteoarthritis involving multiple joints       Patient is here with complaints of right elbow pain/swelling that started Saturday. Patient states it is some redness. Patient does not remember leaning or apply pressure to the right elbow.         Review of Systems   Constitutional:  Negative for appetite change, chills, fatigue and fever.   HENT:  Negative for congestion, ear pain, postnasal drip, rhinorrhea, sinus pressure, sinus pain, sneezing and sore throat.    Respiratory:  Negative for cough, chest tightness, shortness of breath and wheezing.    Cardiovascular:  Negative for chest pain and palpitations.   Gastrointestinal:  Negative for diarrhea, nausea and vomiting.   Skin:  Negative for rash.   Neurological:  Negative for dizziness, light-headedness and headaches.       Current Outpatient Medications   Medication Sig Dispense Refill    predniSONE (DELTASONE) 20 MG tablet Take 2 tablets by mouth daily for 5 days 10 tablet 0    amLODIPine (NORVASC) 5 MG tablet Take 1 tablet by mouth daily 90 tablet 1    hydroCHLOROthiazide (HYDRODIURIL) 25 MG tablet Take 1 tablet by mouth daily 90 tablet 1    losartan (COZAAR) 100 MG tablet Take 1 tablet by mouth daily 90 tablet 1    potassium

## 2024-10-30 ENCOUNTER — OFFICE VISIT (OUTPATIENT)
Dept: CARDIOLOGY CLINIC | Age: 74
End: 2024-10-30

## 2024-10-30 VITALS
DIASTOLIC BLOOD PRESSURE: 76 MMHG | SYSTOLIC BLOOD PRESSURE: 122 MMHG | WEIGHT: 315 LBS | HEIGHT: 75 IN | BODY MASS INDEX: 39.17 KG/M2 | OXYGEN SATURATION: 99 % | HEART RATE: 57 BPM

## 2024-10-30 DIAGNOSIS — E11.9 TYPE 2 DIABETES MELLITUS WITHOUT COMPLICATION, WITHOUT LONG-TERM CURRENT USE OF INSULIN (HCC): ICD-10-CM

## 2024-10-30 DIAGNOSIS — I87.2 PERIPHERAL VENOUS INSUFFICIENCY: ICD-10-CM

## 2024-10-30 DIAGNOSIS — I48.11 LONGSTANDING PERSISTENT ATRIAL FIBRILLATION (HCC): ICD-10-CM

## 2024-10-30 DIAGNOSIS — I42.9 CARDIOMYOPATHY, UNSPECIFIED TYPE (HCC): Primary | ICD-10-CM

## 2024-10-30 DIAGNOSIS — E66.01 CLASS 3 SEVERE OBESITY DUE TO EXCESS CALORIES WITHOUT SERIOUS COMORBIDITY WITH BODY MASS INDEX (BMI) OF 40.0 TO 44.9 IN ADULT: ICD-10-CM

## 2024-10-30 DIAGNOSIS — G47.33 OBSTRUCTIVE SLEEP APNEA SYNDROME: ICD-10-CM

## 2024-10-30 DIAGNOSIS — E78.5 HYPERLIPIDEMIA, UNSPECIFIED HYPERLIPIDEMIA TYPE: ICD-10-CM

## 2024-10-30 DIAGNOSIS — E66.813 CLASS 3 SEVERE OBESITY DUE TO EXCESS CALORIES WITHOUT SERIOUS COMORBIDITY WITH BODY MASS INDEX (BMI) OF 40.0 TO 44.9 IN ADULT: ICD-10-CM

## 2024-10-30 NOTE — PATIENT INSTRUCTIONS
Please be informed that if you contact our office outside of normal business hours the physician on call cannot help with any scheduling or rescheduling issues, procedure instruction questions or any type of medication issue.    We advise you for any urgent/emergency that you go to the nearest emergency room!    PLEASE CALL OUR OFFICE DURING NORMAL BUSINESS HOURS    Monday - Friday   8 am to 5 pm    [place]: [phone]    [place]: [phone]    [place]:  [phone]  Thank you for allowing us to care for you today!   We want to ensure we can follow your treatment plan and we strive to give you the best outcomes and experience possible.   If you ever have a life threatening emergency and call 911 - for an ambulance (EMS)   Our providers can only care for you at:   [facility] or [facility].   Even if you have someone take you or you drive yourself we can only care for you in a [facility] facility. Our providers are not setup at the other healthcare locations!   **It is YOUR responsibilty to bring medication bottles and/or updated medication list to EACH APPOINTMENT. This will allow us to better serve you and all your healthcare needs**  We are committed to providing you the best care possible.    If you receive a survey after visiting one of our offices, please take time to share your experience concerning your physician office visit.  These surveys are confidential and no health information about you is shared.    We are eager to improve for you and we are counting on your feedback to help make that happen.    CORONARY ARTERY DISEASE:None known.     HYPERTENSION:Yes  well controlled on current medical regimen.  - changes in  treatment:   no. Patient is on Amlodipine, HCTZ & Cozaar.  Counseled regarding low salt diet, exercise & weight control.     VALVULAR HEART DISEASE:yes,   4/11/2023   Limited study due to patients body habitus.   Left ventricular function and size is normal, EF

## 2024-10-30 NOTE — PROGRESS NOTES
OFFICE PROGRESS NOTES      Hay is a 74 y.o. male who has    CHIEF COMPLAINT AS FOLLOWS:  CHEST PAIN: Patient denies any C/O chest pains at this time.      SOB: No C/O SOB at this time.                LEG EDEMA: No leg edema   PALPITATIONS: Denies any C/O Palpitations   DIZZINESS: No C/O Dizziness   SYNCOPE: None   OTHER/ ADDITIONAL COMPLAINTS:                                     HPI: Patient is here for F/U on his VHD, Arrhythmia, HTN & Dyslipidemia problems.     VHD: Patient has known mitral / aortic valve disease.   Arrhythmia: Patient has known PAF.  HTN: Patient has known essential HTN. Has been treated with guideline recommended medical / physical/ diet therapy as stated below.  Dyslipidemia: Patient has known mixed dyslipidemia. Has been treated with guideline recommended medical / physical/ diet therapy as stated below.                Current Outpatient Medications   Medication Sig Dispense Refill    predniSONE (DELTASONE) 20 MG tablet Take 2 tablets by mouth daily for 5 days 10 tablet 0    amLODIPine (NORVASC) 5 MG tablet Take 1 tablet by mouth daily 90 tablet 1    hydroCHLOROthiazide (HYDRODIURIL) 25 MG tablet Take 1 tablet by mouth daily 90 tablet 1    losartan (COZAAR) 100 MG tablet Take 1 tablet by mouth daily 90 tablet 1    potassium chloride (KLOR-CON M) 20 MEQ extended release tablet Take 1 tablet by mouth daily 90 tablet 1    warfarin (COUMADIN) 2.5 MG tablet TAKE ONE TABLET BY MOUTH ONCE DAILY IN THE EVENING EXCEPT ON MONDAYS, NO THERAPY 90 tablet 1    colchicine (COLCRYS) 0.6 MG tablet 1 tablet 2 to 3 times daily as needed for gout flare (Patient taking differently: 1 tablet 2 to 3 times daily as needed for gout flare PRN) 25 tablet 1    azelastine (OPTIVAR) 0.05 % ophthalmic solution Place 1 drop into both eyes 2 times daily 1 each 5    UNABLE TO FIND Beet powder for circulation.      Aspirin Buf,CaCarb-MgCarb-MgO, 81 MG TABS Take 1 tablet by mouth daily      Omega-3 1000 MG CAPS Take 1

## 2024-11-11 ENCOUNTER — OFFICE VISIT (OUTPATIENT)
Dept: FAMILY MEDICINE CLINIC | Age: 74
End: 2024-11-11

## 2024-11-11 VITALS
HEART RATE: 68 BPM | SYSTOLIC BLOOD PRESSURE: 126 MMHG | HEIGHT: 75 IN | OXYGEN SATURATION: 98 % | WEIGHT: 315 LBS | DIASTOLIC BLOOD PRESSURE: 70 MMHG | RESPIRATION RATE: 16 BRPM | BODY MASS INDEX: 39.17 KG/M2

## 2024-11-11 DIAGNOSIS — G47.33 OBSTRUCTIVE SLEEP APNEA SYNDROME: ICD-10-CM

## 2024-11-11 DIAGNOSIS — E78.5 HYPERLIPIDEMIA, UNSPECIFIED HYPERLIPIDEMIA TYPE: ICD-10-CM

## 2024-11-11 DIAGNOSIS — E11.9 TYPE 2 DIABETES MELLITUS WITHOUT COMPLICATION, WITHOUT LONG-TERM CURRENT USE OF INSULIN (HCC): ICD-10-CM

## 2024-11-11 DIAGNOSIS — I48.91 ATRIAL FIBRILLATION, UNSPECIFIED TYPE (HCC): Primary | ICD-10-CM

## 2024-11-11 DIAGNOSIS — E66.813 CLASS 3 SEVERE OBESITY DUE TO EXCESS CALORIES WITHOUT SERIOUS COMORBIDITY WITH BODY MASS INDEX (BMI) OF 40.0 TO 44.9 IN ADULT: ICD-10-CM

## 2024-11-11 DIAGNOSIS — I42.9 CARDIOMYOPATHY, UNSPECIFIED TYPE (HCC): ICD-10-CM

## 2024-11-11 DIAGNOSIS — M1A.09X0 IDIOPATHIC CHRONIC GOUT OF MULTIPLE SITES WITHOUT TOPHUS: ICD-10-CM

## 2024-11-11 DIAGNOSIS — E66.01 CLASS 3 SEVERE OBESITY DUE TO EXCESS CALORIES WITHOUT SERIOUS COMORBIDITY WITH BODY MASS INDEX (BMI) OF 40.0 TO 44.9 IN ADULT: ICD-10-CM

## 2024-11-11 LAB
ALBUMIN SERPL-MCNC: 4.2 G/DL (ref 3.4–5)
ALBUMIN/GLOB SERPL: 1.6 {RATIO} (ref 1.1–2.2)
ALP SERPL-CCNC: 56 U/L (ref 40–129)
ALT SERPL-CCNC: 19 U/L (ref 10–40)
ANION GAP SERPL CALCULATED.3IONS-SCNC: 8 MMOL/L (ref 3–16)
AST SERPL-CCNC: 26 U/L (ref 15–37)
BILIRUB SERPL-MCNC: 1.3 MG/DL (ref 0–1)
BUN SERPL-MCNC: 19 MG/DL (ref 7–20)
CALCIUM SERPL-MCNC: 9.7 MG/DL (ref 8.3–10.6)
CHLORIDE SERPL-SCNC: 94 MMOL/L (ref 99–110)
CHOLEST SERPL-MCNC: 175 MG/DL (ref 0–199)
CO2 SERPL-SCNC: 25 MMOL/L (ref 21–32)
CREAT SERPL-MCNC: 0.9 MG/DL (ref 0.8–1.3)
DEPRECATED RDW RBC AUTO: 13.4 % (ref 12.4–15.4)
GFR SERPLBLD CREATININE-BSD FMLA CKD-EPI: >90 ML/MIN/{1.73_M2}
GLUCOSE SERPL-MCNC: 118 MG/DL (ref 70–99)
HCT VFR BLD AUTO: 48.7 % (ref 40.5–52.5)
HDLC SERPL-MCNC: 39 MG/DL (ref 40–60)
HGB BLD-MCNC: 16.6 G/DL (ref 13.5–17.5)
INTERNATIONAL NORMALIZATION RATIO, POC: 2.9
LDLC SERPL CALC-MCNC: 112 MG/DL
MCH RBC QN AUTO: 32.1 PG (ref 26–34)
MCHC RBC AUTO-ENTMCNC: 34.1 G/DL (ref 31–36)
MCV RBC AUTO: 94 FL (ref 80–100)
PLATELET # BLD AUTO: 260 K/UL (ref 135–450)
PMV BLD AUTO: 8.4 FL (ref 5–10.5)
POTASSIUM SERPL-SCNC: 3.7 MMOL/L (ref 3.5–5.1)
PROT SERPL-MCNC: 6.9 G/DL (ref 6.4–8.2)
PROTHROMBIN TIME, POC: 0
RBC # BLD AUTO: 5.19 M/UL (ref 4.2–5.9)
SODIUM SERPL-SCNC: 127 MMOL/L (ref 136–145)
TRIGL SERPL-MCNC: 119 MG/DL (ref 0–150)
VLDLC SERPL CALC-MCNC: 24 MG/DL
WBC # BLD AUTO: 7.2 K/UL (ref 4–11)

## 2024-11-11 ASSESSMENT — ENCOUNTER SYMPTOMS
NAUSEA: 0
TROUBLE SWALLOWING: 0
DIARRHEA: 0
VOMITING: 0
APNEA: 1
SHORTNESS OF BREATH: 0
EYE PAIN: 0
ABDOMINAL PAIN: 0
WHEEZING: 0
CHEST TIGHTNESS: 0
BLOOD IN STOOL: 0

## 2024-11-11 NOTE — PROGRESS NOTES
confirmed current warfarin 2.5 mg 6 days & 0 on Monday. INR = 2.9 Per Dr Schumacher Keep same and repeat in 4 weeks.   
Ear: Ear canal and external ear normal.   Eyes:      Pupils: Pupils are equal, round, and reactive to light.   Cardiovascular:      Rate and Rhythm: Normal rate and regular rhythm.      Heart sounds: Normal heart sounds.   Pulmonary:      Effort: Pulmonary effort is normal.      Breath sounds: Normal breath sounds.   Abdominal:      Palpations: Abdomen is soft.   Musculoskeletal:         General: Normal range of motion.      Cervical back: Normal range of motion and neck supple.   Skin:     General: Skin is warm and dry.   Neurological:      Mental Status: He is alert and oriented to person, place, and time.   Psychiatric:         Thought Content: Thought content normal.         ASSESSMENT & PLAN     Diagnosis Orders   1. Atrial fibrillation, unspecified type (HCC)  POCT INR    POCT INR      2. Type 2 diabetes mellitus without complication, without long-term current use of insulin (HCC)  Hemoglobin A1C      3. Obstructive sleep apnea syndrome  CBC      4. Idiopathic chronic gout of multiple sites without tophus        5. Hyperlipidemia, unspecified hyperlipidemia type  Comprehensive Metabolic Panel    LIPID PANEL      6. Class 3 severe obesity due to excess calories without serious comorbidity with body mass index (BMI) of 40.0 to 44.9 in adult        7. Cardiomyopathy, unspecified type (HCC)        Continue to work on healthy lifestyle with increased exercise and mild weight loss.  Continue on same regimen as per Coumadin flowsheet for Coumadin therapy.  Check A1c, CBC, CMP, and lipid panel and have him follow-up for results.  Med list reviewed -no refills needed at this point.  Call with issues or changes.    Return in about 6 months (around 5/11/2025).         Electronically signed by DEVI QUIÑONEZ MD on 11/11/2024

## 2024-11-12 LAB
EST. AVERAGE GLUCOSE BLD GHB EST-MCNC: 157.1 MG/DL
HBA1C MFR BLD: 7.1 %

## 2024-11-13 DIAGNOSIS — E87.1 HYPONATREMIA: Primary | ICD-10-CM

## 2024-11-24 ENCOUNTER — HOSPITAL ENCOUNTER (EMERGENCY)
Age: 74
Discharge: HOME | End: 2024-11-24
Attending: EMERGENCY MEDICINE
Payer: MEDICARE

## 2024-11-24 ENCOUNTER — APPOINTMENT (OUTPATIENT)
Dept: CT IMAGING | Age: 74
End: 2024-11-24
Payer: MEDICARE

## 2024-11-24 VITALS
OXYGEN SATURATION: 98 % | SYSTOLIC BLOOD PRESSURE: 127 MMHG | RESPIRATION RATE: 16 BRPM | BODY MASS INDEX: 39.17 KG/M2 | HEART RATE: 68 BPM | WEIGHT: 315 LBS | HEIGHT: 75 IN | DIASTOLIC BLOOD PRESSURE: 81 MMHG | TEMPERATURE: 97.5 F

## 2024-11-24 DIAGNOSIS — R10.9 ABDOMINAL PAIN, UNSPECIFIED ABDOMINAL LOCATION: ICD-10-CM

## 2024-11-24 DIAGNOSIS — R11.2 NAUSEA AND VOMITING, UNSPECIFIED VOMITING TYPE: ICD-10-CM

## 2024-11-24 DIAGNOSIS — N20.0 KIDNEY STONE: Primary | ICD-10-CM

## 2024-11-24 DIAGNOSIS — R31.9 HEMATURIA, UNSPECIFIED TYPE: ICD-10-CM

## 2024-11-24 LAB
ALBUMIN SERPL-MCNC: 4.4 G/DL (ref 3.4–5)
ALBUMIN/GLOB SERPL: 1.2 {RATIO} (ref 1.1–2.2)
ALP SERPL-CCNC: 64 U/L (ref 40–129)
ALT SERPL-CCNC: 18 U/L (ref 10–40)
ANION GAP SERPL CALCULATED.3IONS-SCNC: 16 MMOL/L (ref 4–16)
AST SERPL-CCNC: 28 U/L (ref 15–37)
BACTERIA URNS QL MICRO: ABNORMAL
BASOPHILS # BLD: 0.03 K/UL
BASOPHILS NFR BLD: 0 % (ref 0–1)
BILIRUB SERPL-MCNC: 1.2 MG/DL (ref 0–1)
BILIRUB UR QL STRIP: ABNORMAL
BUN SERPL-MCNC: 14 MG/DL (ref 6–23)
CALCIUM SERPL-MCNC: 9.4 MG/DL (ref 8.3–10.6)
CHLORIDE SERPL-SCNC: 97 MMOL/L (ref 99–110)
CLARITY UR: ABNORMAL
CO2 SERPL-SCNC: 27 MMOL/L (ref 21–32)
COLOR UR: ABNORMAL
CREAT SERPL-MCNC: 0.8 MG/DL (ref 0.9–1.3)
EOSINOPHIL # BLD: 0.11 K/UL
EOSINOPHILS RELATIVE PERCENT: 1 % (ref 0–3)
ERYTHROCYTE [DISTWIDTH] IN BLOOD BY AUTOMATED COUNT: 12.9 % (ref 11.7–14.9)
GFR, ESTIMATED: >90 ML/MIN/1.73M2
GLUCOSE SERPL-MCNC: 215 MG/DL (ref 70–99)
GLUCOSE UR STRIP-MCNC: ABNORMAL MG/DL
HCT VFR BLD AUTO: 49.3 % (ref 42–52)
HGB BLD-MCNC: 16.7 G/DL (ref 13.5–18)
HGB UR QL STRIP.AUTO: ABNORMAL
IMM GRANULOCYTES # BLD AUTO: 0.03 K/UL
IMM GRANULOCYTES NFR BLD: 0 %
INR PPP: 2.3
KETONES UR STRIP-MCNC: ABNORMAL MG/DL
LEUKOCYTE ESTERASE UR QL STRIP: ABNORMAL
LIPASE SERPL-CCNC: 19 U/L (ref 13–60)
LYMPHOCYTES NFR BLD: 2.19 K/UL
LYMPHOCYTES RELATIVE PERCENT: 23 % (ref 24–44)
MCH RBC QN AUTO: 31 PG (ref 27–31)
MCHC RBC AUTO-ENTMCNC: 33.9 G/DL (ref 32–36)
MCV RBC AUTO: 91.6 FL (ref 78–100)
MONOCYTES NFR BLD: 0.91 K/UL
MONOCYTES NFR BLD: 9 % (ref 0–4)
NEUTROPHILS NFR BLD: 67 % (ref 36–66)
NEUTS SEG NFR BLD: 6.48 K/UL
NITRITE UR QL STRIP: ABNORMAL
PARTIAL THROMBOPLASTIN TIME: 33.9 SEC (ref 25.1–37.1)
PH UR STRIP: ABNORMAL [PH] (ref 5–8)
PLATELET # BLD AUTO: 259 K/UL (ref 140–440)
PMV BLD AUTO: 9.3 FL (ref 7.5–11.1)
POTASSIUM SERPL-SCNC: 3.6 MMOL/L (ref 3.5–5.1)
PROT SERPL-MCNC: 8 G/DL (ref 6.4–8.2)
PROT UR STRIP-MCNC: ABNORMAL MG/DL
PROTHROMBIN TIME: 26.3 SEC (ref 11.7–14.5)
RBC # BLD AUTO: 5.38 M/UL (ref 4.6–6.2)
RBC #/AREA URNS HPF: ABNORMAL /HPF
SODIUM SERPL-SCNC: 140 MMOL/L (ref 135–145)
SP GR UR STRIP: ABNORMAL (ref 1–1.03)
UROBILINOGEN UR STRIP-ACNC: ABNORMAL EU/DL (ref 0–1)
WBC #/AREA URNS HPF: ABNORMAL /HPF
WBC OTHER # BLD: 9.8 K/UL (ref 4–10.5)

## 2024-11-24 PROCEDURE — 74176 CT ABD & PELVIS W/O CONTRAST: CPT

## 2024-11-24 PROCEDURE — 85610 PROTHROMBIN TIME: CPT

## 2024-11-24 PROCEDURE — 87491 CHLMYD TRACH DNA AMP PROBE: CPT

## 2024-11-24 PROCEDURE — 80053 COMPREHEN METABOLIC PANEL: CPT

## 2024-11-24 PROCEDURE — 87591 N.GONORRHOEAE DNA AMP PROB: CPT

## 2024-11-24 PROCEDURE — 85025 COMPLETE CBC W/AUTO DIFF WBC: CPT

## 2024-11-24 PROCEDURE — 85730 THROMBOPLASTIN TIME PARTIAL: CPT

## 2024-11-24 PROCEDURE — 83690 ASSAY OF LIPASE: CPT

## 2024-11-24 PROCEDURE — 81001 URINALYSIS AUTO W/SCOPE: CPT

## 2024-11-24 PROCEDURE — 87086 URINE CULTURE/COLONY COUNT: CPT

## 2024-11-24 PROCEDURE — 99284 EMERGENCY DEPT VISIT MOD MDM: CPT

## 2024-11-24 NOTE — ED NOTES
Discharge instructions reviewed with patient.  Patient denies further questions and verbalizes understanding

## 2024-11-24 NOTE — ED PROVIDER NOTES
0.8 (L) 0.9 - 1.3 mg/dL    Est, Glom Filt Rate >90 >60 mL/min/1.73m2    Calcium 9.4 8.3 - 10.6 mg/dL    Total Protein 8.0 6.4 - 8.2 g/dL    Albumin 4.4 3.4 - 5.0 g/dL    Albumin/Globulin Ratio 1.2 1.1 - 2.2    Total Bilirubin 1.2 (H) 0.0 - 1.0 mg/dL    Alkaline Phosphatase 64 40 - 129 U/L    ALT 18 10 - 40 U/L    AST 28 15 - 37 U/L   Urinalysis   Result Value Ref Range    Color, UA Red (A) Yellow    Turbidity UA Turbid (A) Clear    Glucose, Ur (A) NEGATIVE mg/dL     Results may be affected due to urine color interference.    Bilirubin, Urine (A) NEGATIVE     Results may be affected due to urine color interference.    Ketones, Urine (A) NEGATIVE mg/dL     Results may be affected due to urine color interference.    Specific Gravity, UA  1.005 - 1.030     Results may be affected due to urine color interference.    Urine Hgb (A) NEGATIVE     Results may be affected due to urine color interference.    pH, Urine  5.0 - 8.0     Results may be affected due to urine color interference.    Protein, UA (A) NEGATIVE mg/dL     Results may be affected due to urine color interference.    Urobilinogen, Urine  0.0 - 1.0 EU/dL     Results may be affected due to urine color interference.    Nitrite, Urine (A) NEGATIVE     Results may be affected due to urine color interference.    Leukocyte Esterase, Urine (A) NEGATIVE     Results may be affected due to urine color interference.   Lipase   Result Value Ref Range    Lipase 19 13 - 60 U/L   Protime-INR   Result Value Ref Range    Protime 26.3 (H) 11.7 - 14.5 sec    INR 2.3    PTT   Result Value Ref Range    APTT 33.9 25.1 - 37.1 sec   Microscopic Urinalysis   Result Value Ref Range    WBC, UA 0 TO 5 0 TO 5 /HPF    RBC, UA TOO NUMEROUS TO COUNT (A) 0 TO 2 /HPF    Bacteria, UA MODERATE (A) None        Radiographs (if obtained):  [] The following radiograph was interpreted by myself in the absence of a radiologist:  [x] Radiologist's Report Reviewed:    CT Abd/pelv    EKG (if obtained):

## 2024-11-25 ENCOUNTER — CARE COORDINATION (OUTPATIENT)
Dept: CARE COORDINATION | Age: 74
End: 2024-11-25

## 2024-11-25 LAB
CHLAMYDIA TRACHOMATIS MOLECULAR: NOT DETECTED
MICROORGANISM SPEC CULT: NORMAL
NEISSERIA GONORRHOEAE MOLECULAR: NOT DETECTED
SERVICE CMNT-IMP: NORMAL
SOURCE: NORMAL
SPECIMEN DESCRIPTION: NORMAL
TRICHOMONAS VAGINALI, MOLECULAR: NOT DETECTED

## 2024-11-25 NOTE — PROGRESS NOTES
Pharmacy Note  ED Culture Follow-up    Hay Boyer is a 74 y.o. male.     Allergies: Patient has no known allergies.     Current antimicrobials:   Reviewed patient's urine and genital culture - cultures are negative. Patient was appropriately discharged on no antimicrobial therapy. No further action needed.     Please call with any questions. Ext. [phone]    Ivet Fuentes RPH, PharmD 2:34 PM 11/25/2024

## 2024-11-25 NOTE — CARE COORDINATION
Ambulatory Care Coordination Note     2024 12:05 PM     Patient Current Location:  Home: [address] [id number]     This patient was received as a referral from [facility] .    Patient contacted the ACM by telephone. Verified name and  with patient as identifiers. Provided introduction to self, and explanation of the ACM role.   Patient declined care management services at this time.          ACM: Tatianna Lang RN     Utilization: N/A - Initial Call     Care Summary Note: ACM outreach to patient for Care Management.     PCP/Specialist follow up:   Future Appointments         Provider Specialty Dept Phone    2024 2:00 PM Brea Perez PA-C Family Medicine [phone]    2024 9:00 AM SCHEDULE, [facility] NURSE Family Medicine [phone]    2025 8:15 AM Juan Manuel Meadows MD Cardiology [phone]    2025 8:00 AM (Arrive by 7:45 AM) Rivas Beltran MD Family Medicine [phone]

## 2024-11-25 NOTE — CARE COORDINATION
Ambulatory Care Coordination Note     11/25/2024 9:19 AM     ACM outreach attempt by this ACM today to offer care management services. ACM was unable to reach the patient by telephone today;   left voice message requesting a return phone call to this ACM.     ACM: Tatianna Lang RN     Care Summary Note: ACM outreach to patient for Care Management.     PCP/Specialist follow up:   Future Appointments         Provider Specialty Dept Phone    12/9/2024 9:00 AM SCHEDULE, [facility] NURSE Family Medicine [phone]    5/1/2025 8:15 AM Juan Manuel Meadows MD Cardiology [phone]    5/12/2025 8:00 AM (Arrive by 7:45 AM) Rivas Beltran MD Family Medicine [phone]            Follow Up:   Plan for next ACM outreach in approximately 1 week to complete:  - outreach attempt to offer care management services.

## 2024-11-26 ENCOUNTER — OFFICE VISIT (OUTPATIENT)
Dept: FAMILY MEDICINE CLINIC | Age: 74
End: 2024-11-26
Payer: MEDICARE

## 2024-11-26 VITALS
HEART RATE: 57 BPM | BODY MASS INDEX: 39.17 KG/M2 | OXYGEN SATURATION: 100 % | SYSTOLIC BLOOD PRESSURE: 136 MMHG | DIASTOLIC BLOOD PRESSURE: 74 MMHG | RESPIRATION RATE: 18 BRPM | HEIGHT: 75 IN | WEIGHT: 315 LBS

## 2024-11-26 DIAGNOSIS — N20.0 KIDNEY STONE: ICD-10-CM

## 2024-11-26 DIAGNOSIS — R35.0 URINARY FREQUENCY: Primary | ICD-10-CM

## 2024-11-26 LAB
BILIRUBIN, POC: NEGATIVE
BLOOD URINE, POC: ABNORMAL
CLARITY, POC: ABNORMAL
COLOR, POC: ABNORMAL
GLUCOSE URINE, POC: NEGATIVE MG/DL
KETONES, POC: NEGATIVE MG/DL
LEUKOCYTE EST, POC: NEGATIVE
NITRITE, POC: NEGATIVE
PH, POC: 5.5
PROTEIN, POC: ABNORMAL MG/DL
SPECIFIC GRAVITY, POC: ABNORMAL
UROBILINOGEN, POC: ABNORMAL MG/DL

## 2024-11-26 PROCEDURE — 1123F ACP DISCUSS/DSCN MKR DOCD: CPT | Performed by: STUDENT IN AN ORGANIZED HEALTH CARE EDUCATION/TRAINING PROGRAM

## 2024-11-26 PROCEDURE — 1159F MED LIST DOCD IN RCRD: CPT | Performed by: STUDENT IN AN ORGANIZED HEALTH CARE EDUCATION/TRAINING PROGRAM

## 2024-11-26 PROCEDURE — 81002 URINALYSIS NONAUTO W/O SCOPE: CPT | Performed by: STUDENT IN AN ORGANIZED HEALTH CARE EDUCATION/TRAINING PROGRAM

## 2024-11-26 PROCEDURE — 99213 OFFICE O/P EST LOW 20 MIN: CPT | Performed by: STUDENT IN AN ORGANIZED HEALTH CARE EDUCATION/TRAINING PROGRAM

## 2024-11-26 NOTE — PROGRESS NOTES
12/11/2024    Hay Boyer    Chief Complaint   Patient presents with    Follow-up     ER f/up kidney stone. Not having pain or blood in urine now. Is having urinary frequency and urgency. Has to go every 30 minutes.     Health Maintenance     Declines AWV.        HPI  Hay is a 74 y.o. male with a PMHx as listed below who presents today for ED follow up kidney stone. He feels he has passed      1. Urinary frequency    2. Kidney stone             REVIEW OF SYMPTOMS    Review of Systems   Constitutional:  Negative for chills and fatigue.   HENT:  Negative for congestion and sore throat.    Respiratory:  Negative for shortness of breath and wheezing.    Cardiovascular:  Negative for chest pain and palpitations.   Gastrointestinal:  Negative for abdominal pain and nausea.   Genitourinary:  Negative for frequency and urgency.   Neurological:  Negative for light-headedness.       PAST MEDICAL HISTORY  Past Medical History:   Diagnosis Date    Allergic rhinitis 05/24/2019    Atrial fibrillation (HCC)     Atrial fibrillation (HCC)     Cancer (HCC)     skin    Cardiomyopathy (HCC) 05/24/2019    Hilar lymphadenopathy 05/24/2019    calcified    Hyperlipidemia 05/24/2019    Hypertension     Impotence 05/24/2019    Obstructive sleep apnea syndrome 05/24/2019    Peripheral venous insufficiency 05/24/2019    Sleep apnea        FAMILY HISTORY  Family History   Problem Relation Age of Onset    Arthritis Mother     Dementia Mother     Stroke Mother         TIAs    High Blood Pressure Father     Clotting Disorder Father     Other Father         aneurysym    Diabetes Sister     Other Sister         aneurysym    Kidney Disease Sister     Diabetes Brother         pre diabetic    Gait Disorder Brother     High Blood Pressure Brother     Cancer Brother         melanoma       SOCIAL HISTORY  Social History     Socioeconomic History    Marital status:    Tobacco Use    Smoking status: Never    Smokeless tobacco: Never   Vaping

## 2024-12-09 ENCOUNTER — NURSE ONLY (OUTPATIENT)
Dept: FAMILY MEDICINE CLINIC | Age: 74
End: 2024-12-09
Payer: MEDICARE

## 2024-12-09 DIAGNOSIS — E87.1 HYPONATREMIA: ICD-10-CM

## 2024-12-09 DIAGNOSIS — I48.91 ATRIAL FIBRILLATION, UNSPECIFIED TYPE (HCC): ICD-10-CM

## 2024-12-09 LAB
ANION GAP SERPL CALCULATED.3IONS-SCNC: 14 MMOL/L (ref 3–16)
BUN SERPL-MCNC: 15 MG/DL (ref 7–20)
CALCIUM SERPL-MCNC: 9.4 MG/DL (ref 8.3–10.6)
CHLORIDE SERPL-SCNC: 101 MMOL/L (ref 99–110)
CO2 SERPL-SCNC: 25 MMOL/L (ref 21–32)
CREAT SERPL-MCNC: 0.8 MG/DL (ref 0.8–1.3)
GFR SERPLBLD CREATININE-BSD FMLA CKD-EPI: >90 ML/MIN/{1.73_M2}
GLUCOSE SERPL-MCNC: 132 MG/DL (ref 70–99)
INTERNATIONAL NORMALIZATION RATIO, POC: 3
POTASSIUM SERPL-SCNC: 4.1 MMOL/L (ref 3.5–5.1)
PROTHROMBIN TIME, POC: NORMAL
PSA SERPL DL<=0.01 NG/ML-MCNC: 0.87 NG/ML (ref 0–4)
SODIUM SERPL-SCNC: 140 MMOL/L (ref 136–145)

## 2024-12-09 PROCEDURE — 85610 PROTHROMBIN TIME: CPT | Performed by: FAMILY MEDICINE

## 2024-12-11 ASSESSMENT — ENCOUNTER SYMPTOMS
SHORTNESS OF BREATH: 0
ABDOMINAL PAIN: 0
SORE THROAT: 0
NAUSEA: 0
WHEEZING: 0

## 2025-01-08 ENCOUNTER — NURSE ONLY (OUTPATIENT)
Dept: FAMILY MEDICINE CLINIC | Age: 75
End: 2025-01-08
Payer: MEDICARE

## 2025-01-08 DIAGNOSIS — I48.91 ATRIAL FIBRILLATION, UNSPECIFIED TYPE (HCC): ICD-10-CM

## 2025-01-08 LAB
INTERNATIONAL NORMALIZATION RATIO, POC: 3
PROTHROMBIN TIME, POC: 36.1

## 2025-01-08 PROCEDURE — 85610 PROTHROMBIN TIME: CPT | Performed by: FAMILY MEDICINE

## 2025-01-08 PROCEDURE — 36415 COLL VENOUS BLD VENIPUNCTURE: CPT | Performed by: FAMILY MEDICINE

## 2025-01-22 ENCOUNTER — TELEPHONE (OUTPATIENT)
Dept: CARDIOLOGY CLINIC | Age: 75
End: 2025-01-22

## 2025-01-22 NOTE — TELEPHONE ENCOUNTER
Cardiologist: Dr. Meadows  Surgeon: Dr. Toscano  Surgery: Cystolitholapaxy Aquablation of Prostate   Surgery/Procedure should be done in the hospital setting    _____ yes    _____  no    Anesthesia: General  Date: 2/10/2025  Fax# [phone]  # [phone]    Last OV 10/30/2024 w/Abdiel    CORONARY ARTERY DISEASE:None known.     HYPERTENSION:Yes  well controlled on current medical regimen.  - changes in  treatment:   no. Patient is on Amlodipine, HCTZ & Cozaar.  Counseled regarding low salt diet, exercise & weight control.     VALVULAR HEART DISEASE:yes,     ARRHYTHMIAS:yes,   Patient has H/O Atrial fibrillation  He is rate controlled & on anticoagulation. Takes Warfarin     CHRONIC VENOUS INSUFFICIENCY:yes, being managed with the help of compression stockings.                SEDRICK: On C-pap.      Last EKG- 2/7/2024      NM- none      Echo- 4/11/2023   Limited study due to patients body habitus.   Left ventricular function and size is normal, EF is estimated at 55-60%.   Moderate left ventricular hypertrophy.   Diastolic dysfunction could not be evaluated due to arrhythmia.   Severely dilated left atrium.   Right side of the heart is mildly enlarged.   Sclerotic, but non-stenotic aortic valve.   Mild to Moderate mitral, moderate aortic and mild tricuspid regurgitation is   present.   RVSP is 32 mmHg.   Dilation of the aortic root(4.1cm) and the ascending aorta(3.8cm).   No evidence of pericardial effusion.      Coumadin

## 2025-01-24 ENCOUNTER — TELEPHONE (OUTPATIENT)
Dept: CARDIOLOGY CLINIC | Age: 75
End: 2025-01-24

## 2025-01-24 DIAGNOSIS — Z01.810 PRE-OPERATIVE CARDIOVASCULAR EXAMINATION: Primary | ICD-10-CM

## 2025-01-31 ENCOUNTER — TELEPHONE (OUTPATIENT)
Dept: CARDIOLOGY CLINIC | Age: 75
End: 2025-01-31

## 2025-01-31 NOTE — TELEPHONE ENCOUNTER
Dyana with Urology specialist called. Pt is having surgery on 2/10. We have the clearance form but they are wanting pt to stop Warfrin and Aspirin on 2/5. She just needs to get an ok for this. You may reach her at [phone]. This is her personal cell and is secured.

## 2025-01-31 NOTE — TELEPHONE ENCOUNTER
Christa rahman/ [facility] asking for a ekf from  His recent nuclear fax [phone]

## 2025-02-03 ENCOUNTER — TELEPHONE (OUTPATIENT)
Dept: CARDIOLOGY CLINIC | Age: 75
End: 2025-02-03

## 2025-02-03 NOTE — PROGRESS NOTES
Patient Name: Hay Boyer  : 1950  MRN# [id number]    REASON FOR VISIT: Results of Testing  Patient Active Problem List    Diagnosis Date Noted    Hyperglycemia 11/10/2022    Primary osteoarthritis involving multiple joints 05/10/2024    Type 2 diabetes mellitus 2024    Idiopathic chronic gout of multiple sites without tophus 2020    Class 3 severe obesity due to excess calories without serious comorbidity with body mass index (BMI) of 40.0 to 44.9 in adult 2020    Hyperlipidemia 2019    Allergic rhinitis 2019    Peripheral venous insufficiency 2019    Cardiomyopathy (HCC) 2019    Impotence 2019    Atrial fibrillation (HCC) 2019    Obstructive sleep apnea syndrome 2019    Hilar lymphadenopathy 2019     CURRENT SX:     Chest Pain :    When did it begin:    How long does it last:    How severe 1-10:    Radiation:    Aggravating factors:    Relieving factors:    Associated features:    Tenderness to palp:    Shortness of Breath:    When did it start:    How many flights of stairs can they climb without SOB:    Associated features:    Orthopena; how many pillows do they sleep with under your head :    Dizziness    Palpitations:    When did it begin:    How often do they occur:    How long do they last:    Aggravating factors:    Relieving factors:    Associated features:    Any thyroid issues:    How much caffeine:    Edema    Do you Exercise??    LABS:  Lab Results   Component Value Date    CHOL 175 2024    TRIG 119 2024    HDL 39 (L) 2024    CHOLHDLRATIO 4.3 2018     Hemoglobin A1C   Date Value Ref Range Status   2024 7.1 See comment % Final     Comment:     Comment:  Diagnosis of Diabetes: > or = 6.5%  Increased risk of diabetes (Prediabetes): 5.7-6.4%  Glycemic Control: Nonpregnant Adults: <7.0%                    Pregnant: <6.0%

## 2025-02-04 ENCOUNTER — HOSPITAL ENCOUNTER (OUTPATIENT)
Age: 75
Discharge: HOME | End: 2025-02-04
Payer: MEDICARE

## 2025-02-04 ENCOUNTER — OFFICE VISIT (OUTPATIENT)
Dept: CARDIOLOGY CLINIC | Age: 75
End: 2025-02-04
Payer: MEDICARE

## 2025-02-04 ENCOUNTER — TELEPHONE (OUTPATIENT)
Dept: CARDIOLOGY CLINIC | Age: 75
End: 2025-02-04

## 2025-02-04 VITALS
HEART RATE: 62 BPM | WEIGHT: 315 LBS | HEIGHT: 75 IN | DIASTOLIC BLOOD PRESSURE: 84 MMHG | SYSTOLIC BLOOD PRESSURE: 118 MMHG | BODY MASS INDEX: 39.17 KG/M2

## 2025-02-04 DIAGNOSIS — I48.11 LONGSTANDING PERSISTENT ATRIAL FIBRILLATION (HCC): ICD-10-CM

## 2025-02-04 DIAGNOSIS — I10 PRIMARY HYPERTENSION: Primary | ICD-10-CM

## 2025-02-04 DIAGNOSIS — Z01.810 PRE-OPERATIVE CARDIOVASCULAR EXAMINATION: Primary | ICD-10-CM

## 2025-02-04 DIAGNOSIS — Z79.01 LONG TERM (CURRENT) USE OF ANTICOAGULANTS: ICD-10-CM

## 2025-02-04 DIAGNOSIS — Z01.810 PRE-OPERATIVE CARDIOVASCULAR EXAMINATION: ICD-10-CM

## 2025-02-04 DIAGNOSIS — E11.9 TYPE 2 DIABETES MELLITUS WITHOUT COMPLICATION, WITHOUT LONG-TERM CURRENT USE OF INSULIN (HCC): ICD-10-CM

## 2025-02-04 DIAGNOSIS — E66.01 CLASS 3 SEVERE OBESITY DUE TO EXCESS CALORIES WITHOUT SERIOUS COMORBIDITY WITH BODY MASS INDEX (BMI) OF 40.0 TO 44.9 IN ADULT: ICD-10-CM

## 2025-02-04 DIAGNOSIS — I42.9 CARDIOMYOPATHY, UNSPECIFIED TYPE (HCC): ICD-10-CM

## 2025-02-04 DIAGNOSIS — E66.813 CLASS 3 SEVERE OBESITY DUE TO EXCESS CALORIES WITHOUT SERIOUS COMORBIDITY WITH BODY MASS INDEX (BMI) OF 40.0 TO 44.9 IN ADULT: ICD-10-CM

## 2025-02-04 DIAGNOSIS — G47.33 OBSTRUCTIVE SLEEP APNEA SYNDROME: ICD-10-CM

## 2025-02-04 DIAGNOSIS — R94.39 ABNORMAL NUCLEAR STRESS TEST: ICD-10-CM

## 2025-02-04 DIAGNOSIS — E78.5 HYPERLIPIDEMIA, UNSPECIFIED HYPERLIPIDEMIA TYPE: ICD-10-CM

## 2025-02-04 LAB
ABO + RH BLD: NORMAL
ANION GAP SERPL CALCULATED.3IONS-SCNC: 13 MMOL/L (ref 9–17)
BLOOD BANK COMMENT: NORMAL
BLOOD BANK SAMPLE EXPIRATION: NORMAL
BLOOD GROUP ANTIBODIES SERPL: NEGATIVE
BUN SERPL-MCNC: 17 MG/DL (ref 7–20)
CALCIUM SERPL-MCNC: 9.2 MG/DL (ref 8.3–10.6)
CHLORIDE SERPL-SCNC: 103 MMOL/L (ref 99–110)
CO2 SERPL-SCNC: 23 MMOL/L (ref 21–32)
CREAT SERPL-MCNC: 0.8 MG/DL (ref 0.8–1.3)
ERYTHROCYTE [DISTWIDTH] IN BLOOD BY AUTOMATED COUNT: 13.4 % (ref 11.7–14.9)
GFR, ESTIMATED: 90 ML/MIN/1.73M2
GLUCOSE SERPL-MCNC: 154 MG/DL (ref 74–99)
HCT VFR BLD AUTO: 48.3 % (ref 42–52)
HGB BLD-MCNC: 15.6 G/DL (ref 13.5–18)
INR PPP: 2.1
MCH RBC QN AUTO: 30.5 PG (ref 27–31)
MCHC RBC AUTO-ENTMCNC: 32.3 G/DL (ref 32–36)
MCV RBC AUTO: 94.3 FL (ref 78–100)
PLATELET # BLD AUTO: 284 K/UL (ref 140–440)
PMV BLD AUTO: 9.7 FL (ref 7.5–11.1)
POTASSIUM SERPL-SCNC: 3.8 MMOL/L (ref 3.5–5.1)
PROTHROMBIN TIME: 24 SEC (ref 11.7–14.5)
RBC # BLD AUTO: 5.12 M/UL (ref 4.6–6.2)
SODIUM SERPL-SCNC: 140 MMOL/L (ref 136–145)
WBC OTHER # BLD: 9.3 K/UL (ref 4–10.5)

## 2025-02-04 PROCEDURE — 3079F DIAST BP 80-89 MM HG: CPT | Performed by: INTERNAL MEDICINE

## 2025-02-04 PROCEDURE — 80048 BASIC METABOLIC PNL TOTAL CA: CPT

## 2025-02-04 PROCEDURE — 85027 COMPLETE CBC AUTOMATED: CPT

## 2025-02-04 PROCEDURE — 36415 COLL VENOUS BLD VENIPUNCTURE: CPT

## 2025-02-04 PROCEDURE — 99214 OFFICE O/P EST MOD 30 MIN: CPT | Performed by: INTERNAL MEDICINE

## 2025-02-04 PROCEDURE — 86850 RBC ANTIBODY SCREEN: CPT

## 2025-02-04 PROCEDURE — 1159F MED LIST DOCD IN RCRD: CPT | Performed by: INTERNAL MEDICINE

## 2025-02-04 PROCEDURE — 1160F RVW MEDS BY RX/DR IN RCRD: CPT | Performed by: INTERNAL MEDICINE

## 2025-02-04 PROCEDURE — 85610 PROTHROMBIN TIME: CPT

## 2025-02-04 PROCEDURE — 3074F SYST BP LT 130 MM HG: CPT | Performed by: INTERNAL MEDICINE

## 2025-02-04 PROCEDURE — 86900 BLOOD TYPING SEROLOGIC ABO: CPT

## 2025-02-04 PROCEDURE — 1123F ACP DISCUSS/DSCN MKR DOCD: CPT | Performed by: INTERNAL MEDICINE

## 2025-02-04 PROCEDURE — 86901 BLOOD TYPING SEROLOGIC RH(D): CPT

## 2025-02-04 NOTE — TELEPHONE ENCOUNTER
[facility]     Dr. Juan Manuel Meadows     LEFT HEART CATHETERIZATION WITH POSSIBLE PERCUTANEOUS CORONARY INTERVENTION                      Patient Name: Hay Boyer   : 1950  MRN# [id number]    Date of Procedure: 25 Time: 645 Arrival Time: 545    The catheterization and angiogram are usually outpatient procedures, however if stenting is needed you may need to stay overnight. You will need to arrive at the hospital two hours before the procedure.  You will go to registration in the main lobby.  You will need to arrange for someone to drive you home.      HOSPITAL:  [facility] ([facility])      X   If you have received orders for blood work and or a chest x-ray, please have         them done on assigned date at [facility],           [facility], or [facility].     X Please do not have anything by mouth after midnight prior to or 8 hours before   the procedure.    X You may take your medications with a sip of water in the morning of your               procedure or take them with you to the hospital                    x If you are taking HCTZ (Hydrochlorothiazide)   please do not take it the morning of your procedure.       x If you take COUMADIN, it should be held 5 days prior to cath starting on 25.    x If you take Viagra (Sildenafil) or Cialis (Tadalafil) you will need to hold it for 3 days before your procedure.

## 2025-02-04 NOTE — PROGRESS NOTES
sinus rhythm at 70 bpm with trigeminal PVCs.  SPECT images obtained standard short axis, vertical and horizontal long axis views reveal large area of significant Cardiolite attenuation in the anterolateral portion and possibly inferior wall area as well.  With improvement in delayed imaging.  This is suggestive of multivessel disease pattern.  Gated scan performed reveals normal LV function and wall motion ejection fraction is 55%.  Please arrange follow-up office visit as soon as possible patient will probably need diagnostic cardiac catheterization prior to proposed surgery.     HYPERTENSION:Yes  well controlled on current medical regimen.  - changes in  treatment:   no. Patient is on Amlodipine, HCTZ & Cozaar.  Counseled regarding low salt diet, exercise & weight control.     VALVULAR HEART DISEASE:yes,   4/11/2023   Limited study due to patients body habitus.   Left ventricular function and size is normal, EF is estimated at 55-60%.   Moderate left ventricular hypertrophy.   Diastolic dysfunction could not be evaluated due to arrhythmia.   Severely dilated left atrium.   Right side of the heart is mildly enlarged.   Sclerotic, but non-stenotic aortic valve.   Mild to Moderate mitral, moderate aortic and mild tricuspid regurgitation is   present.   RVSP is 32 mmHg.   Dilation of the aortic root(4.1cm) and the ascending aorta(3.8cm).   No evidence of pericardial effusion.                CAROTID ARTERY DISEASE:no  3/23/2023   No evidence of hemodynamically significant stenosis in the bilateral carotid    and vertebral arteries.      ARRHYTHMIAS:yes,   Patient has H/O Atrial fibrillation  He is rate controlled & on anticoagulation. Takes Warfarin     CHRONIC VENOUS INSUFFICIENCY:yes, being managed with the help of compression stockings.                SEDRICK: On C-pap.     TESTS ORDERED:Left Heart Cath possible PCI     PREVIOUSLY ORDERED TESTS REVIEWED & DISCUSSED WITH THE PATIENT:     I personally reviewed &

## 2025-02-04 NOTE — PATIENT INSTRUCTIONS
CORONARY ARTERY DISEASE:None known.  1/29/2025  Abnormal Lexiscan Cardiolite study.  Patient's baseline EKG reveals normal sinus rhythm at 70 bpm with trigeminal PVCs.  SPECT images obtained standard short axis, vertical and horizontal long axis views reveal large area of significant Cardiolite attenuation in the anterolateral portion and possibly inferior wall area as well.  With improvement in delayed imaging.  This is suggestive of multivessel disease pattern.  Gated scan performed reveals normal LV function and wall motion ejection fraction is 55%.  Please arrange follow-up office visit as soon as possible patient will probably need diagnostic cardiac catheterization prior to proposed surgery.     HYPERTENSION:Yes  well controlled on current medical regimen.  - changes in  treatment:   no. Patient is on Amlodipine, HCTZ & Cozaar.  Counseled regarding low salt diet, exercise & weight control.     VALVULAR HEART DISEASE:yes,   4/11/2023   Limited study due to patients body habitus.   Left ventricular function and size is normal, EF is estimated at 55-60%.   Moderate left ventricular hypertrophy.   Diastolic dysfunction could not be evaluated due to arrhythmia.   Severely dilated left atrium.   Right side of the heart is mildly enlarged.   Sclerotic, but non-stenotic aortic valve.   Mild to Moderate mitral, moderate aortic and mild tricuspid regurgitation is   present.   RVSP is 32 mmHg.   Dilation of the aortic root(4.1cm) and the ascending aorta(3.8cm).   No evidence of pericardial effusion.                CAROTID ARTERY DISEASE:no  3/23/2023   No evidence of hemodynamically significant stenosis in the bilateral carotid    and vertebral arteries.      ARRHYTHMIAS:yes,   Patient has H/O Atrial fibrillation  He is rate controlled & on anticoagulation. Takes Warfarin     CHRONIC VENOUS INSUFFICIENCY:yes, being managed with the help of compression stockings.                SEDRICK: On C-pap.     TESTS ORDERED:Left Heart

## 2025-02-05 NOTE — H&P
HISTORY & PHYSICAL        CHIEF COMPLAINT: Abnormal Lexiscan Cardiolite study performed for preop clearance    HISTORY OF PRESENT ILLNESS:  Hay is a 74 y.o. male evaluated for preop assessment.  Patient had not had any coronary artery disease assessment for more than 12 years or so.  Cardiolite perfusion imaging is abnormal hence he is being scheduled to undergo diagnostic cardiac catheterization for further risk stratification.  1/29/2025  Abnormal Lexiscan Cardiolite study.  Patient's baseline EKG reveals normal sinus rhythm at 70 bpm with trigeminal PVCs.  SPECT images obtained standard short axis, vertical and horizontal long axis views reveal large area of significant Cardiolite attenuation in the anterolateral portion and possibly inferior wall area as well.  With improvement in delayed imaging.  This is suggestive of multivessel disease pattern.  Gated scan performed reveals normal LV function and wall motion ejection fraction is 55%.    Hay Boyer has the following history recorded in [place]:  Patient Active Problem List    Diagnosis Date Noted    Hyperglycemia 11/10/2022    Primary hypertension 02/04/2025    Abnormal nuclear stress test 02/04/2025    Primary osteoarthritis involving multiple joints 05/10/2024    Type 2 diabetes mellitus 03/06/2024    Idiopathic chronic gout of multiple sites without tophus 05/19/2020    Class 3 severe obesity due to excess calories without serious comorbidity with body mass index (BMI) of 40.0 to 44.9 in adult 05/19/2020    Hyperlipidemia 05/24/2019    Allergic rhinitis 05/24/2019    Peripheral venous insufficiency 05/24/2019    Cardiomyopathy (HCC) 05/24/2019    Impotence 05/24/2019    Atrial fibrillation (HCC) 05/24/2019    Obstructive sleep apnea syndrome 05/24/2019    Hilar lymphadenopathy 05/24/2019     No current facility-administered medications for this encounter.

## 2025-02-06 ENCOUNTER — HOSPITAL ENCOUNTER (OUTPATIENT)
Age: 75
Setting detail: OUTPATIENT SURGERY
Discharge: HOME | End: 2025-02-06
Attending: INTERNAL MEDICINE | Admitting: INTERNAL MEDICINE
Payer: MEDICARE

## 2025-02-06 VITALS
HEIGHT: 74 IN | BODY MASS INDEX: 40.43 KG/M2 | TEMPERATURE: 97.2 F | OXYGEN SATURATION: 98 % | WEIGHT: 315 LBS | RESPIRATION RATE: 18 BRPM | SYSTOLIC BLOOD PRESSURE: 130 MMHG | HEART RATE: 76 BPM | DIASTOLIC BLOOD PRESSURE: 95 MMHG

## 2025-02-06 DIAGNOSIS — R94.39 ABNORMAL NUCLEAR STRESS TEST: ICD-10-CM

## 2025-02-06 LAB
ECHO BSA: 2.81 M2
INR PPP: 1.6
PROTHROMBIN TIME: 19.7 SEC (ref 11.7–14.5)

## 2025-02-06 PROCEDURE — 99153 MOD SED SAME PHYS/QHP EA: CPT | Performed by: INTERNAL MEDICINE

## 2025-02-06 PROCEDURE — C1769 GUIDE WIRE: HCPCS | Performed by: INTERNAL MEDICINE

## 2025-02-06 PROCEDURE — 93458 L HRT ARTERY/VENTRICLE ANGIO: CPT | Performed by: INTERNAL MEDICINE

## 2025-02-06 PROCEDURE — 7100000011 HC PHASE II RECOVERY - ADDTL 15 MIN: Performed by: INTERNAL MEDICINE

## 2025-02-06 PROCEDURE — C1894 INTRO/SHEATH, NON-LASER: HCPCS | Performed by: INTERNAL MEDICINE

## 2025-02-06 PROCEDURE — 6360000002 HC RX W HCPCS: Performed by: INTERNAL MEDICINE

## 2025-02-06 PROCEDURE — 2500000003 HC RX 250 WO HCPCS: Performed by: INTERNAL MEDICINE

## 2025-02-06 PROCEDURE — 2709999900 HC NON-CHARGEABLE SUPPLY: Performed by: INTERNAL MEDICINE

## 2025-02-06 PROCEDURE — 7100000010 HC PHASE II RECOVERY - FIRST 15 MIN: Performed by: INTERNAL MEDICINE

## 2025-02-06 PROCEDURE — 6360000004 HC RX CONTRAST MEDICATION: Performed by: INTERNAL MEDICINE

## 2025-02-06 PROCEDURE — 6370000000 HC RX 637 (ALT 250 FOR IP): Performed by: INTERNAL MEDICINE

## 2025-02-06 PROCEDURE — 99152 MOD SED SAME PHYS/QHP 5/>YRS: CPT | Performed by: INTERNAL MEDICINE

## 2025-02-06 PROCEDURE — 85610 PROTHROMBIN TIME: CPT

## 2025-02-06 RX ORDER — SODIUM CHLORIDE 9 MG/ML
INJECTION, SOLUTION INTRAVENOUS CONTINUOUS
Status: DISCONTINUED | OUTPATIENT
Start: 2025-02-06 | End: 2025-02-06 | Stop reason: HOSPADM

## 2025-02-06 RX ORDER — SODIUM CHLORIDE 0.9 % (FLUSH) 0.9 %
5-40 SYRINGE (ML) INJECTION EVERY 12 HOURS SCHEDULED
Status: DISCONTINUED | OUTPATIENT
Start: 2025-02-06 | End: 2025-02-06 | Stop reason: HOSPADM

## 2025-02-06 RX ORDER — DIPHENHYDRAMINE HCL 25 MG
25 TABLET ORAL ONCE
Status: COMPLETED | OUTPATIENT
Start: 2025-02-06 | End: 2025-02-06

## 2025-02-06 RX ORDER — ACETAMINOPHEN 325 MG/1
650 TABLET ORAL EVERY 4 HOURS PRN
Status: DISCONTINUED | OUTPATIENT
Start: 2025-02-06 | End: 2025-02-06 | Stop reason: HOSPADM

## 2025-02-06 RX ORDER — SODIUM CHLORIDE 0.9 % (FLUSH) 0.9 %
5-40 SYRINGE (ML) INJECTION PRN
Status: DISCONTINUED | OUTPATIENT
Start: 2025-02-06 | End: 2025-02-06 | Stop reason: HOSPADM

## 2025-02-06 RX ORDER — IOPAMIDOL 755 MG/ML
INJECTION, SOLUTION INTRAVASCULAR PRN
Status: DISCONTINUED | OUTPATIENT
Start: 2025-02-06 | End: 2025-02-06 | Stop reason: HOSPADM

## 2025-02-06 RX ORDER — SODIUM CHLORIDE 9 MG/ML
INJECTION, SOLUTION INTRAVENOUS PRN
Status: DISCONTINUED | OUTPATIENT
Start: 2025-02-06 | End: 2025-02-06 | Stop reason: HOSPADM

## 2025-02-06 RX ORDER — DIAZEPAM 5 MG/1
5 TABLET ORAL ONCE
Status: COMPLETED | OUTPATIENT
Start: 2025-02-06 | End: 2025-02-06

## 2025-02-06 RX ADMIN — DIAZEPAM 5 MG: 5 TABLET ORAL at 06:11

## 2025-02-06 RX ADMIN — DIPHENHYDRAMINE HYDROCHLORIDE 25 MG: 25 TABLET ORAL at 06:11

## 2025-02-06 NOTE — PROGRESS NOTES
Discharge instructions reviewed. Copy given to patient, questions answered.  All belongings accounted for. Discharge medications reviewed.  IV removed, intact and dressing applied.

## 2025-02-07 ENCOUNTER — TELEPHONE (OUTPATIENT)
Dept: CARDIOLOGY CLINIC | Age: 75
End: 2025-02-07

## 2025-02-07 NOTE — TELEPHONE ENCOUNTER
Spoke w/pt after recent procedure. Pt states he is doing well, denies any issues at this time. Reminded pt of upcoming f/u appt on 2/14/25 @9am. Pt confirmed appt.

## 2025-02-13 NOTE — PROGRESS NOTES
Patient Name: Hay Boyer  : 1950  MRN# [id number]    REASON FOR VISIT: [facility] F/U  Patient Active Problem List    Diagnosis Date Noted    Hyperglycemia 11/10/2022    Primary hypertension 2025    Abnormal nuclear stress test 2025    Primary osteoarthritis involving multiple joints 05/10/2024    Type 2 diabetes mellitus 2024    Idiopathic chronic gout of multiple sites without tophus 2020    Class 3 severe obesity due to excess calories without serious comorbidity with body mass index (BMI) of 40.0 to 44.9 in adult 2020    Hyperlipidemia 2019    Allergic rhinitis 2019    Peripheral venous insufficiency 2019    Cardiomyopathy (HCC) 2019    Impotence 2019    Atrial fibrillation (HCC) 2019    Obstructive sleep apnea syndrome 2019    Hilar lymphadenopathy 2019     CURRENT SX:     Chest Pain :    When did it begin:    How long does it last:    How severe 1-10:    Radiation:    Aggravating factors:    Relieving factors:    Associated features:    Tenderness to palp:    Shortness of Breath:    When did it start:    How many flights of stairs can they climb without SOB:    Associated features:    Orthopena; how many pillows do they sleep with under your head :    Dizziness    Palpitations:    When did it begin:    How often do they occur:    How long do they last:    Aggravating factors:    Relieving factors:    Associated features:    Any thyroid issues:    How much caffeine:    Edema    Do you Exercise??    LABS:  Lab Results   Component Value Date    CHOL 175 2024    TRIG 119 2024    HDL 39 (L) 2024    CHOLHDLRATIO 4.3 2018     Hemoglobin A1C   Date Value Ref Range Status   2024 7.1 See comment % Final     Comment:     Comment:  Diagnosis of Diabetes: > or = 6.5%  Increased risk of diabetes (Prediabetes): 5.7-6.4%  Glycemic Control: Nonpregnant Adults: <7.0%                    Pregnant: <6.0%

## 2025-03-07 ENCOUNTER — NURSE ONLY (OUTPATIENT)
Dept: FAMILY MEDICINE CLINIC | Age: 75
End: 2025-03-07

## 2025-03-07 DIAGNOSIS — I48.91 ATRIAL FIBRILLATION, UNSPECIFIED TYPE (HCC): Primary | ICD-10-CM

## 2025-03-07 LAB — INTERNATIONAL NORMALIZATION RATIO, POC: 5.7

## 2025-03-10 ENCOUNTER — HOSPITAL ENCOUNTER (EMERGENCY)
Age: 75
Discharge: HOME | End: 2025-03-10
Attending: EMERGENCY MEDICINE
Payer: MEDICARE

## 2025-03-10 VITALS
TEMPERATURE: 98.8 F | DIASTOLIC BLOOD PRESSURE: 70 MMHG | HEIGHT: 75 IN | BODY MASS INDEX: 39.17 KG/M2 | WEIGHT: 315 LBS | HEART RATE: 83 BPM | OXYGEN SATURATION: 97 % | RESPIRATION RATE: 11 BRPM | SYSTOLIC BLOOD PRESSURE: 104 MMHG

## 2025-03-10 DIAGNOSIS — R31.0 GROSS HEMATURIA: Primary | ICD-10-CM

## 2025-03-10 LAB
ABSOLUTE BANDS: 0.15 K/UL
ANION GAP SERPL CALCULATED.3IONS-SCNC: 12 MMOL/L (ref 9–17)
BACTERIA URNS QL MICRO: ABNORMAL
BANDS: 1 %
BASOPHILS # BLD: 0 K/UL
BASOPHILS NFR BLD: 0 % (ref 0–1)
BILIRUB UR QL STRIP: ABNORMAL
BUN SERPL-MCNC: 22 MG/DL (ref 7–20)
CALCIUM SERPL-MCNC: 8.2 MG/DL (ref 8.3–10.6)
CHLORIDE SERPL-SCNC: 95 MMOL/L (ref 99–110)
CLARITY UR: ABNORMAL
CO2 SERPL-SCNC: 24 MMOL/L (ref 21–32)
COLOR UR: ABNORMAL
CREAT SERPL-MCNC: 1 MG/DL (ref 0.8–1.3)
EOSINOPHIL # BLD: 0.45 K/UL
EOSINOPHILS RELATIVE PERCENT: 3 % (ref 0–3)
ERYTHROCYTE [DISTWIDTH] IN BLOOD BY AUTOMATED COUNT: 13.2 % (ref 11.7–14.9)
GFR, ESTIMATED: 71 ML/MIN/1.73M2
GLUCOSE SERPL-MCNC: 190 MG/DL (ref 74–99)
GLUCOSE UR STRIP-MCNC: ABNORMAL MG/DL
HCT VFR BLD AUTO: 42.2 % (ref 42–52)
HGB BLD-MCNC: 14 G/DL (ref 13.5–18)
HGB UR QL STRIP.AUTO: ABNORMAL
INR PPP: 3.3
KETONES UR STRIP-MCNC: ABNORMAL MG/DL
LEUKOCYTE ESTERASE UR QL STRIP: ABNORMAL
LYMPHOCYTES NFR BLD: 1.96 K/UL
LYMPHOCYTES RELATIVE PERCENT: 13 % (ref 24–44)
MCH RBC QN AUTO: 29.8 PG (ref 27–31)
MCHC RBC AUTO-ENTMCNC: 33.2 G/DL (ref 32–36)
MCV RBC AUTO: 89.8 FL (ref 78–100)
MONOCYTES NFR BLD: 1.51 K/UL
MONOCYTES NFR BLD: 10 % (ref 0–4)
NEUTROPHILS NFR BLD: 73 % (ref 36–66)
NEUTS SEG NFR BLD: 11.02 K/UL
NITRITE UR QL STRIP: ABNORMAL
PH UR STRIP: ABNORMAL [PH] (ref 5–8)
PLATELET ESTIMATE: ABNORMAL
PLATELET, FLUORESCENCE: 47 K/UL (ref 140–440)
PMV BLD AUTO: 14 FL (ref 7.5–11.1)
POTASSIUM SERPL-SCNC: 3.7 MMOL/L (ref 3.5–5.1)
PROT UR STRIP-MCNC: ABNORMAL MG/DL
PROTHROMBIN TIME: 33.6 SEC (ref 11.7–14.5)
RBC # BLD AUTO: 4.7 M/UL (ref 4.6–6.2)
RBC # BLD: NORMAL 10*6/UL
RBC #/AREA URNS HPF: 1619 /HPF (ref 0–2)
SODIUM SERPL-SCNC: 131 MMOL/L (ref 136–145)
SP GR UR STRIP: ABNORMAL (ref 1–1.03)
UROBILINOGEN UR STRIP-ACNC: ABNORMAL EU/DL (ref 0–1)
WBC # BLD: NORMAL 10*3/UL
WBC #/AREA URNS HPF: 78 /HPF (ref 0–5)
WBC OTHER # BLD: 15.1 K/UL (ref 4–10.5)

## 2025-03-10 PROCEDURE — 87186 SC STD MICRODIL/AGAR DIL: CPT

## 2025-03-10 PROCEDURE — 80048 BASIC METABOLIC PNL TOTAL CA: CPT

## 2025-03-10 PROCEDURE — 99283 EMERGENCY DEPT VISIT LOW MDM: CPT

## 2025-03-10 PROCEDURE — 87086 URINE CULTURE/COLONY COUNT: CPT

## 2025-03-10 PROCEDURE — 85025 COMPLETE CBC W/AUTO DIFF WBC: CPT

## 2025-03-10 PROCEDURE — 87077 CULTURE AEROBIC IDENTIFY: CPT

## 2025-03-10 PROCEDURE — 81001 URINALYSIS AUTO W/SCOPE: CPT

## 2025-03-10 PROCEDURE — 85610 PROTHROMBIN TIME: CPT

## 2025-03-10 ASSESSMENT — PAIN - FUNCTIONAL ASSESSMENT
PAIN_FUNCTIONAL_ASSESSMENT: 0-10
PAIN_FUNCTIONAL_ASSESSMENT: 0-10

## 2025-03-10 ASSESSMENT — PAIN SCALES - GENERAL
PAINLEVEL_OUTOF10: 0
PAINLEVEL_OUTOF10: 0

## 2025-03-10 NOTE — ED NOTES
The following labs were labeled with appropriate pt sticker and tubed to lab:     [] Blue     [x] Lavender  x2 [] on ice  [] Green/yellow  [] Green/black [] on ice  [] Grey  [] on ice  [] Yellow  [] Red  [] Pink  [] Type/ Screen  [] ABG  [] VBG    [] COVID-19 swab    [] Rapid  [] PCR  [] Flu swab  [] Peds Viral Panel     [] Urine Sample  [] Fecal Sample  [] Pelvic Cultures  [] Blood Cultures  [] X 2  [] STREP Cultures  [] Wound Cultures

## 2025-03-10 NOTE — ED PROVIDER NOTES
Triage Chief Complaint:   Hematuria (Blood clots)    [place]:  Hay Boyer is a 74 y.o. male that presents for hematuria.  The patient states that he has been having intermittent hematuria since November of last year.  He had cystoscopy with bladder stone removal and aqua ablation done last month and continues to have intermittent hematuria, incontinence.  States that he has been passing larger blood clots but feels like he is still emptying his bladder.  His INR was 5.73 days ago which she attributes to taking Tylenol last week for influenza.  States that warfarin has been held but he restarted this last night.  Denies any abdominal pain.    ROS:  At least 6 systems reviewed and otherwise acutely negative except as in the [place].    Past Medical History:   Diagnosis Date    Allergic rhinitis 05/24/2019    Atrial fibrillation (HCC)     Atrial fibrillation (HCC)     Cancer (HCC)     skin    Cardiomyopathy (HCC) 05/24/2019    Hilar lymphadenopathy 05/24/2019    calcified    History of left heart catheterization (LHC) 02/06/2025    Hyperlipidemia 05/24/2019    Hypertension     Impotence 05/24/2019    Obstructive sleep apnea syndrome 05/24/2019    Peripheral venous insufficiency 05/24/2019    Sleep apnea      Past Surgical History:   Procedure Laterality Date    APPENDECTOMY      CARDIAC PROCEDURE N/A 2/6/2025    Left heart cath / coronary angiography performed by Juan Manuel Meadows MD at [facility] CARDIAC CATH LAB    CARDIOVERSION      KNEE ARTHROSCOPY      right knee     Family History   Problem Relation Age of Onset    Arthritis Mother     Dementia Mother     Stroke Mother         TIAs    High Blood Pressure Father     Clotting Disorder Father     Other Father         aneurysym    Diabetes Sister     Other Sister         aneurysym    Kidney Disease Sister     Diabetes Brother         pre diabetic    Gait Disorder Brother     High Blood Pressure Brother     Cancer Brother         melanoma     Social History     Socioeconomic

## 2025-03-11 ENCOUNTER — CARE COORDINATION (OUTPATIENT)
Dept: CARE COORDINATION | Age: 75
End: 2025-03-11

## 2025-03-11 ENCOUNTER — TELEPHONE (OUTPATIENT)
Dept: FAMILY MEDICINE CLINIC | Age: 75
End: 2025-03-11

## 2025-03-11 NOTE — CARE COORDINATION
Ambulatory Care Coordination Note     3/11/2025 3:26 PM     Patient Current Location:  Home: [address] [id number]     This patient was received as a referral from [facility] .    ACM contacted the patient by telephone. Verified name and  with patient as identifiers. Provided introduction to self, and explanation of the ACM role.   Patient declined care management services at this time.          ACM: Tatianna Lang RN     Challenges to be reviewed by the provider   Additional needs identified to be addressed with provider Yes  Patient is very concerned about his INR, he is still experiencing bleeding from the catheter. Patient stated you are the person who follows his INR. Urology stated not to take his coumadin for a couple of days. I stated to watch it for a few days hopefully the bleeding will subside. His INR was 3.3 on 3/10/2025. He would like for you to reach out to him and advise him on what he is suppose to do. He sounds very anxious about the whole situation. Thank you.                Method of communication with provider: chart routing.    Utilization: Initial Call - N/A    Care Summary Note: ACM outreach to patient for Care Management.       PCP/Specialist follow up:   Future Appointments         Provider Specialty Dept Phone    3/21/2025 10:45 AM (Arrive by 10:30 AM) SCHEDULE, [facility] NURSE Family Medicine [phone]    3/26/2025 12:15 PM Juan Manuel Meadows MD Cardiology [phone]    2025 8:15 AM Juan Manuel Meadows MD Cardiology [phone]    2025 8:00 AM (Arrive by 7:45 AM) Rivas Beltran MD Family Medicine [phone]

## 2025-03-11 NOTE — TELEPHONE ENCOUNTER
Per Dr pruitt secure message I contacted patient per his request. [facility] Per Dr Pruitt will wait and see what results are evaluate when we get results.     Please call Hay Boyer- patient's been having bleeding from his Renner cath after a urologic procedure. Last INR was 3.3 (3/10/2025). This was done on 3/10 please see if we can repeat an INR on 3/12/2025

## 2025-03-12 ENCOUNTER — HOSPITAL ENCOUNTER (EMERGENCY)
Age: 75
Discharge: HOME | End: 2025-03-12
Attending: EMERGENCY MEDICINE
Payer: MEDICARE

## 2025-03-12 ENCOUNTER — RESULTS FOLLOW-UP (OUTPATIENT)
Dept: EMERGENCY DEPT | Age: 75
End: 2025-03-12

## 2025-03-12 VITALS
DIASTOLIC BLOOD PRESSURE: 71 MMHG | HEART RATE: 77 BPM | TEMPERATURE: 97.9 F | WEIGHT: 315 LBS | RESPIRATION RATE: 14 BRPM | BODY MASS INDEX: 39.17 KG/M2 | HEIGHT: 75 IN | SYSTOLIC BLOOD PRESSURE: 130 MMHG | OXYGEN SATURATION: 99 %

## 2025-03-12 DIAGNOSIS — T83.011A MALFUNCTION OF FOLEY CATHETER, INITIAL ENCOUNTER: Primary | ICD-10-CM

## 2025-03-12 LAB
MICROORGANISM SPEC CULT: ABNORMAL
MICROORGANISM SPEC CULT: ABNORMAL
SERVICE CMNT-IMP: ABNORMAL
SPECIMEN DESCRIPTION: ABNORMAL

## 2025-03-12 PROCEDURE — 99282 EMERGENCY DEPT VISIT SF MDM: CPT

## 2025-03-12 RX ORDER — CEPHALEXIN 500 MG/1
500 CAPSULE ORAL 4 TIMES DAILY
Qty: 28 CAPSULE | Refills: 0 | Status: ON HOLD | OUTPATIENT
Start: 2025-03-12 | End: 2025-03-16 | Stop reason: HOSPADM

## 2025-03-12 ASSESSMENT — PAIN DESCRIPTION - DESCRIPTORS: DESCRIPTORS: PRESSURE

## 2025-03-12 ASSESSMENT — PAIN DESCRIPTION - LOCATION: LOCATION: ABDOMEN

## 2025-03-12 ASSESSMENT — PAIN - FUNCTIONAL ASSESSMENT: PAIN_FUNCTIONAL_ASSESSMENT: 0-10

## 2025-03-12 ASSESSMENT — PAIN DESCRIPTION - FREQUENCY: FREQUENCY: CONTINUOUS

## 2025-03-12 ASSESSMENT — PAIN SCALES - GENERAL: PAINLEVEL_OUTOF10: 6

## 2025-03-12 ASSESSMENT — PAIN DESCRIPTION - PAIN TYPE: TYPE: ACUTE PAIN

## 2025-03-12 NOTE — ED NOTES
The patient presents to the er today with concerns that his soler is clogged. He reports of going to the ED in [place] on Sunday for hematuria. He reports of going to [facility] yesterday and had a soler placed. There is bloody urine in the collection container. The call light is within reach.

## 2025-03-12 NOTE — ED NOTES
The patient was repositioned in the bed. The patient has had 3 glasses of water since he has been here.  Family are at the bedside.

## 2025-03-12 NOTE — ED PROVIDER NOTES
Eye:  Extraocular movements intact.     Ears, nose, mouth and throat:  Oral mucosa moist   Neck:  Trachea midline.   Extremity:  No swelling.  Normal ROM     Heart:  Regular rate and rhythm, normal S1 & S2, no extra heart sounds.    Perfusion:  intact  Respiratory:  Lungs clear to auscultation bilaterally.  Respirations nonlabored.     Abdominal:  Normal bowel sounds.  Soft.  Nontender.  Non distended.  Renner catheter noted, some urine in the bag  Back:  No CVA tenderness to palpation     Neurological:  Alert and oriented times 3.  No focal neuro deficits.             Psychiatric:  Appropriate    I have reviewed and interpreted all of the currently available lab results from this visit (if applicable):  No results found for this visit on 03/12/25.   Radiographs (if obtained):  Radiologist's Report Reviewed:  No results found.        MDM:  CC/HPI Summary, DDx, ED Course, and Reassessment:   Hay Boyer is a 74 y.o. male with history of hyperlipidemia peripheral venous insufficiency cardiomyopathy atrial fibrillation sleep apnea obesity type 2 diabetes hypertension osteoarthritis that presents to the emergency department complaining of Renner catheter not draining.  Patient states he noticed he was not draining around 12:00 noon today.  Patient states he just had his catheter replaced yesterday at the urologist office it was draining fine when he left during last evening and this morning.  Patient states his wife did try to irrigate the catheter but no relief.  He states he also got a call from the hospital and was placed on antibiotics a day which is wife is going to get from the pharmacy.    On physical exam patient appears in no acute distress not septic appearing, vitals are normal afebrile, lungs are clear abdomen obese benign, Renner catheter intact, minimal amount of urine in catheter bag.    Differential diagnose include urinary retention, Renner catheter malfunction of dysfunction,    Patient was ordered

## 2025-03-12 NOTE — DISCHARGE INSTRUCTIONS
Follow-up with your urologist in 1 to 2 days for reevaluation.  Call for an appointment  Return to the emergency department for any pain fever chills nausea vomiting dizzy lightheadedness or any worsening symptoms  Take Keflex antibiotic as prescribed and directed.

## 2025-03-12 NOTE — ED NOTES
The soler was irrigated and seems to be draining. A bladder scan did not show any retention in the bladder.

## 2025-03-12 NOTE — PROGRESS NOTES
Pharmacy Note  ED Culture Follow-up    Hay Boyer is a 74 y.o. male.     Allergies: Patient has no known allergies.     Labs:  Lab Results   Component Value Date    BUN 22 (H) 03/10/2025    CREATININE 1.0 03/10/2025    WBC 15.1 (H) 03/10/2025     Estimated Creatinine Clearance: 103 mL/min (based on SCr of 1 mg/dL).    Current antimicrobials:   None     ASSESSMENT:  Micro results:   Urine culture: positive for Klebsiella pneumoniae >100,000 CFU/ml     PLAN:  Need for intervention: Yes  Discussed with: Dr. Mcgill  Chosen treatment:    Keflex 500 mg by mouth four times daily for 7 days    Patient response:   Called and spoke with patient.    Counseled patient on follow up with PCP/urologist    Called/sent in prescription to:  [facility]    Please call with any questions. Ext. [phone]    Nadia Sheikh RPH, PharmD 3:27 PM 3/12/2025

## 2025-03-13 ENCOUNTER — NURSE ONLY (OUTPATIENT)
Dept: FAMILY MEDICINE CLINIC | Age: 75
End: 2025-03-13
Payer: MEDICARE

## 2025-03-13 DIAGNOSIS — I48.91 ATRIAL FIBRILLATION, UNSPECIFIED TYPE (HCC): ICD-10-CM

## 2025-03-13 LAB
INTERNATIONAL NORMALIZATION RATIO, POC: 2.4
PROTHROMBIN TIME, POC: 28.2

## 2025-03-13 PROCEDURE — 36415 COLL VENOUS BLD VENIPUNCTURE: CPT | Performed by: FAMILY MEDICINE

## 2025-03-13 PROCEDURE — 85610 PROTHROMBIN TIME: CPT | Performed by: FAMILY MEDICINE

## 2025-03-13 NOTE — PROGRESS NOTES
confirmed current warfarin 2.5 mg x 6 days and 0 on Monday.  INR= 2.4Patient states he has not taking any warfin since Jude 3/9/25. Per Dr Richardson start on  same dose and repeat on 3/21/25.

## 2025-03-14 ENCOUNTER — RESULTS FOLLOW-UP (OUTPATIENT)
Dept: FAMILY MEDICINE CLINIC | Age: 75
End: 2025-03-14

## 2025-03-15 ENCOUNTER — HOSPITAL ENCOUNTER (INPATIENT)
Age: 75
LOS: 4 days | Discharge: HOME HEALTH | End: 2025-03-19
Attending: EMERGENCY MEDICINE | Admitting: STUDENT IN AN ORGANIZED HEALTH CARE EDUCATION/TRAINING PROGRAM
Payer: MEDICARE

## 2025-03-15 ENCOUNTER — APPOINTMENT (OUTPATIENT)
Dept: CT IMAGING | Age: 75
End: 2025-03-15
Payer: MEDICARE

## 2025-03-15 DIAGNOSIS — R31.0 GROSS HEMATURIA: ICD-10-CM

## 2025-03-15 DIAGNOSIS — N13.9 URINARY OBSTRUCTION: Primary | ICD-10-CM

## 2025-03-15 PROBLEM — R31.9 HEMATURIA: Status: ACTIVE | Noted: 2025-03-15

## 2025-03-15 LAB
ANION GAP SERPL CALCULATED.3IONS-SCNC: 10 MMOL/L (ref 9–17)
BASOPHILS # BLD: 0.03 K/UL
BASOPHILS NFR BLD: 0 % (ref 0–1)
BILIRUB UR QL STRIP: ABNORMAL
BUN SERPL-MCNC: 17 MG/DL (ref 7–20)
CALCIUM SERPL-MCNC: 9 MG/DL (ref 8.3–10.6)
CHLORIDE SERPL-SCNC: 101 MMOL/L (ref 99–110)
CLARITY UR: ABNORMAL
CO2 SERPL-SCNC: 18 MMOL/L (ref 21–32)
COLOR UR: ABNORMAL
CREAT SERPL-MCNC: 0.9 MG/DL (ref 0.8–1.3)
EOSINOPHIL # BLD: 0.12 K/UL
EOSINOPHILS RELATIVE PERCENT: 1 % (ref 0–3)
ERYTHROCYTE [DISTWIDTH] IN BLOOD BY AUTOMATED COUNT: 13.4 % (ref 11.7–14.9)
GFR, ESTIMATED: 79 ML/MIN/1.73M2
GLUCOSE SERPL-MCNC: 132 MG/DL (ref 74–99)
GLUCOSE UR STRIP-MCNC: ABNORMAL MG/DL
HCT VFR BLD AUTO: 35.6 % (ref 42–52)
HGB BLD-MCNC: 11.7 G/DL (ref 13.5–18)
HGB UR QL STRIP.AUTO: ABNORMAL
IMM GRANULOCYTES # BLD AUTO: 0.27 K/UL
IMM GRANULOCYTES NFR BLD: 2 %
INR PPP: 2
KETONES UR STRIP-MCNC: ABNORMAL MG/DL
LEUKOCYTE ESTERASE UR QL STRIP: ABNORMAL
LYMPHOCYTES NFR BLD: 1.93 K/UL
LYMPHOCYTES RELATIVE PERCENT: 14 % (ref 24–44)
MCH RBC QN AUTO: 29.8 PG (ref 27–31)
MCHC RBC AUTO-ENTMCNC: 32.9 G/DL (ref 32–36)
MCV RBC AUTO: 90.6 FL (ref 78–100)
MONOCYTES NFR BLD: 1.1 K/UL
MONOCYTES NFR BLD: 8 % (ref 0–4)
NEUTROPHILS NFR BLD: 75 % (ref 36–66)
NEUTS SEG NFR BLD: 10.26 K/UL
NITRITE UR QL STRIP: ABNORMAL
PH UR STRIP: ABNORMAL [PH] (ref 5–8)
PLATELET, FLUORESCENCE: 142 K/UL (ref 140–440)
PMV BLD AUTO: 13.8 FL (ref 7.5–11.1)
POTASSIUM SERPL-SCNC: 4.1 MMOL/L (ref 3.5–5.1)
PROT UR STRIP-MCNC: ABNORMAL MG/DL
PROTHROMBIN TIME: 23.2 SEC (ref 11.7–14.5)
RBC # BLD AUTO: 3.93 M/UL (ref 4.6–6.2)
RBC #/AREA URNS HPF: NORMAL /HPF
SODIUM SERPL-SCNC: 129 MMOL/L (ref 136–145)
SP GR UR STRIP: ABNORMAL (ref 1–1.03)
UROBILINOGEN UR STRIP-ACNC: ABNORMAL EU/DL (ref 0.2–1)
WBC #/AREA URNS HPF: NORMAL /HPF
WBC OTHER # BLD: 13.7 K/UL (ref 4–10.5)

## 2025-03-15 PROCEDURE — 85025 COMPLETE CBC W/AUTO DIFF WBC: CPT

## 2025-03-15 PROCEDURE — 85610 PROTHROMBIN TIME: CPT

## 2025-03-15 PROCEDURE — 99285 EMERGENCY DEPT VISIT HI MDM: CPT

## 2025-03-15 PROCEDURE — 2140000000 HC CCU INTERMEDIATE R&B

## 2025-03-15 PROCEDURE — 94761 N-INVAS EAR/PLS OXIMETRY MLT: CPT

## 2025-03-15 PROCEDURE — 2500000003 HC RX 250 WO HCPCS: Performed by: STUDENT IN AN ORGANIZED HEALTH CARE EDUCATION/TRAINING PROGRAM

## 2025-03-15 PROCEDURE — 6360000004 HC RX CONTRAST MEDICATION: Performed by: EMERGENCY MEDICINE

## 2025-03-15 PROCEDURE — 36415 COLL VENOUS BLD VENIPUNCTURE: CPT

## 2025-03-15 PROCEDURE — 81001 URINALYSIS AUTO W/SCOPE: CPT

## 2025-03-15 PROCEDURE — 6360000002 HC RX W HCPCS: Performed by: STUDENT IN AN ORGANIZED HEALTH CARE EDUCATION/TRAINING PROGRAM

## 2025-03-15 PROCEDURE — 74177 CT ABD & PELVIS W/CONTRAST: CPT

## 2025-03-15 PROCEDURE — 80048 BASIC METABOLIC PNL TOTAL CA: CPT

## 2025-03-15 RX ORDER — ACETAMINOPHEN 650 MG/1
650 SUPPOSITORY RECTAL EVERY 6 HOURS PRN
Status: DISCONTINUED | OUTPATIENT
Start: 2025-03-15 | End: 2025-03-19 | Stop reason: HOSPADM

## 2025-03-15 RX ORDER — SODIUM CHLORIDE 0.9 % (FLUSH) 0.9 %
5-40 SYRINGE (ML) INJECTION PRN
Status: DISCONTINUED | OUTPATIENT
Start: 2025-03-15 | End: 2025-03-19 | Stop reason: HOSPADM

## 2025-03-15 RX ORDER — POLYETHYLENE GLYCOL 3350 17 G/17G
17 POWDER, FOR SOLUTION ORAL DAILY PRN
Status: DISCONTINUED | OUTPATIENT
Start: 2025-03-15 | End: 2025-03-19 | Stop reason: HOSPADM

## 2025-03-15 RX ORDER — LIDOCAINE HYDROCHLORIDE 20 MG/ML
JELLY TOPICAL PRN
Status: DISCONTINUED | OUTPATIENT
Start: 2025-03-15 | End: 2025-03-19 | Stop reason: HOSPADM

## 2025-03-15 RX ORDER — HYDROCHLOROTHIAZIDE 25 MG/1
25 TABLET ORAL DAILY
Status: DISCONTINUED | OUTPATIENT
Start: 2025-03-15 | End: 2025-03-19 | Stop reason: HOSPADM

## 2025-03-15 RX ORDER — ONDANSETRON 4 MG/1
4 TABLET, ORALLY DISINTEGRATING ORAL EVERY 8 HOURS PRN
Status: DISCONTINUED | OUTPATIENT
Start: 2025-03-15 | End: 2025-03-19 | Stop reason: HOSPADM

## 2025-03-15 RX ORDER — LOSARTAN POTASSIUM 100 MG/1
100 TABLET ORAL DAILY
Status: DISCONTINUED | OUTPATIENT
Start: 2025-03-15 | End: 2025-03-19 | Stop reason: HOSPADM

## 2025-03-15 RX ORDER — ACETAMINOPHEN 325 MG/1
650 TABLET ORAL EVERY 6 HOURS PRN
Status: DISCONTINUED | OUTPATIENT
Start: 2025-03-15 | End: 2025-03-19 | Stop reason: HOSPADM

## 2025-03-15 RX ORDER — ONDANSETRON 2 MG/ML
4 INJECTION INTRAMUSCULAR; INTRAVENOUS EVERY 6 HOURS PRN
Status: DISCONTINUED | OUTPATIENT
Start: 2025-03-15 | End: 2025-03-19 | Stop reason: HOSPADM

## 2025-03-15 RX ORDER — MAGNESIUM SULFATE IN WATER 40 MG/ML
2000 INJECTION, SOLUTION INTRAVENOUS PRN
Status: DISCONTINUED | OUTPATIENT
Start: 2025-03-15 | End: 2025-03-19 | Stop reason: HOSPADM

## 2025-03-15 RX ORDER — AMLODIPINE BESYLATE 5 MG/1
5 TABLET ORAL DAILY
Status: DISCONTINUED | OUTPATIENT
Start: 2025-03-15 | End: 2025-03-19 | Stop reason: HOSPADM

## 2025-03-15 RX ORDER — SODIUM CHLORIDE 9 MG/ML
INJECTION, SOLUTION INTRAVENOUS PRN
Status: DISCONTINUED | OUTPATIENT
Start: 2025-03-15 | End: 2025-03-19 | Stop reason: HOSPADM

## 2025-03-15 RX ORDER — SODIUM CHLORIDE 0.9 % (FLUSH) 0.9 %
5-40 SYRINGE (ML) INJECTION EVERY 12 HOURS SCHEDULED
Status: DISCONTINUED | OUTPATIENT
Start: 2025-03-15 | End: 2025-03-19 | Stop reason: HOSPADM

## 2025-03-15 RX ORDER — IOPAMIDOL 755 MG/ML
75 INJECTION, SOLUTION INTRAVASCULAR
Status: COMPLETED | OUTPATIENT
Start: 2025-03-15 | End: 2025-03-15

## 2025-03-15 RX ORDER — POTASSIUM CHLORIDE 7.45 MG/ML
10 INJECTION INTRAVENOUS PRN
Status: DISCONTINUED | OUTPATIENT
Start: 2025-03-15 | End: 2025-03-19 | Stop reason: HOSPADM

## 2025-03-15 RX ORDER — POTASSIUM CHLORIDE 1500 MG/1
40 TABLET, EXTENDED RELEASE ORAL PRN
Status: DISCONTINUED | OUTPATIENT
Start: 2025-03-15 | End: 2025-03-19 | Stop reason: HOSPADM

## 2025-03-15 RX ADMIN — WATER 1000 MG: 1 INJECTION INTRAMUSCULAR; INTRAVENOUS; SUBCUTANEOUS at 18:36

## 2025-03-15 RX ADMIN — SODIUM CHLORIDE, PRESERVATIVE FREE 10 ML: 5 INJECTION INTRAVENOUS at 20:54

## 2025-03-15 RX ADMIN — IOPAMIDOL 75 ML: 755 INJECTION, SOLUTION INTRAVENOUS at 17:57

## 2025-03-15 ASSESSMENT — PAIN DESCRIPTION - DESCRIPTORS: DESCRIPTORS: ACHING

## 2025-03-15 ASSESSMENT — PAIN SCALES - GENERAL
PAINLEVEL_OUTOF10: 0
PAINLEVEL_OUTOF10: 3

## 2025-03-15 ASSESSMENT — PAIN DESCRIPTION - LOCATION: LOCATION: SHOULDER

## 2025-03-15 ASSESSMENT — PAIN DESCRIPTION - ORIENTATION: ORIENTATION: LEFT

## 2025-03-15 NOTE — PROGRESS NOTES
[address] [address]   Urology Procedure Note  [facility]  [id number] + [id number]    Date: 03/15/25   Patient: Hay Boyer   : 1950  DOA: 3/15/2025   MRN: [id number]   ROOM#: ED15/ED-15      Reason for Procedure: Gross hematuria, clot retention  Collaborating Urologist: Dr. Toscano     Procedure Note: A Uro-Jet was applied. The patient was prepped and draped in the usual sterile fashion. A 22F 3-way Renner was sterilely inserted. There was mild stenosis at the meatus. There was no significant resistance at the level of the prostatic urethra. There was return of grossly bloody urine in the tubing. Balloon inflated with 20cc of sterile water. Tubing secured with an appropriate device. The patient was then cleaned and dried having tolerated the procedure well and without complication.    Manual irrigation was performed which returned few small clots and urine remained visibly bloody but clearer at the end of irrigation.CBI was initiated and draining pink to light red.    - Maintain Renner on CBI and wean to clear light pink as able  - Keep tubing secured  - CT is ordered  - Coumadin is held  - No plans for surgery at this time  - Full consult note to follow     Patient seen and examined, chart reviewed.   Electronically signed by DOMO Collins on 03/15/25 at 1:27 PM

## 2025-03-15 NOTE — PROGRESS NOTES
Phleb at bedside for lab draw. Emptied 2600ml cherry red urine from CBI. Transport here to take patient to assigned inpatient room

## 2025-03-15 NOTE — H&P
V2.0  History and Physical      Name:  Hay Boyer /Age/Sex: 1950  (74 y.o. male)   MRN & CSN:  [id number] & [id number] Encounter Date/Time: 3/15/2025 1:04 PM EDT   Location:  ED15/ED-15 PCP: Rivas Beltran MD       Hospital Day: 1    Assessment and Plan:   Hay Boyer is a 74 y.o. male  who presents with Hematuria    Hospital Problems           Last Modified POA    * (Principal) Hematuria 3/15/2025 Yes     Assessment and Plan:  Gross hematuria plan for CBI.  Urology on board for three-way catheter placement.  No need for n.p.o. advance diet as tolerated telemetry in place admitted to PCU.  Monitor hemoglobin transfuse to keep hemoglobin above 7 prophylactic Rocephin no need for cultures  Leukocytosis likely reactive in the setting of above  History of atrial fibrillation on warfarin due to cost patient previously was on Xarelto.  Hold off on warfarin acute kidney failure last INR was 2.3.  Check INR every other day  Benign essential hypertension continue home medication including losartan hydrochlorothiazide and amlodipine  Chronic gout on colchicine at home which is on hold because of nephrotoxic effects  Obstructive sleep apnea continue with home CPAP  Anemia in the setting of multiple continue to monitor if further anemia develops consider iron studies for now Mentzer index is above 13 MCV is normal does not meet criteria for iron deficiency at the moment    Advance Care Planning     10 minutes were spent discussing the patient's resuscitation status, advance care planning, and end of life care with the patient and/or family/surrogate.    The patient and/or family/surrogate voluntarily agreed to participate in ACP services.    Patient's cognitive capacity: Alert and oriented X3    I answered all the patient/family questions that I could within the range and scope of the current medical situation.  We discussed the medical conditions, risks, benefits, outcomes, and goals of care at this  vomiting diarrhea constipation dizziness lightheadedness leg pain leg swelling.  Since emptying of the bladder patient symptoms are resolved but does feel a little weak.  Continue to monitor.  Urology at bedside will be placing three-way catheter.  To be admitted to the hospital with CBI plans     Review of Systems: Need 10 Elements   Review of Systems  Negative except above  Objective:   No intake or output data in the 24 hours ending 03/15/25 1304   Vitals:   Vitals:    03/15/25 1100 03/15/25 1130 03/15/25 1200 03/15/25 1230   BP: 107/76 108/70 117/74 107/75   Pulse: 86 88 84 80   Resp: 16 19 27 17   Temp:       SpO2:       Weight:       Height:           Medications Prior to Admission     Prior to Admission medications    Medication Sig Start Date End Date Taking? Authorizing Provider   cephALEXin (KEFLEX) 500 MG capsule Take 1 capsule by mouth 4 times daily for 7 days 3/12/25 3/19/25  Charles Mcgill MD   amLODIPine (NORVASC) 5 MG tablet Take 1 tablet by mouth daily 10/4/24   Rivas Beltran MD   hydroCHLOROthiazide (HYDRODIURIL) 25 MG tablet Take 1 tablet by mouth daily 10/4/24   Rivas Beltran MD   losartan (COZAAR) 100 MG tablet Take 1 tablet by mouth daily 10/4/24   Rivas Beltran MD   potassium chloride (KLOR-CON M) 20 MEQ extended release tablet Take 1 tablet by mouth daily 10/4/24   Rivas Beltran MD   warfarin (COUMADIN) 2.5 MG tablet TAKE ONE TABLET BY MOUTH ONCE DAILY IN THE EVENING EXCEPT ON MONDAYS, NO THERAPY  Patient not taking: Reported on 3/12/2025 10/4/24   Rivas Beltran MD   colchicine (COLCRYS) 0.6 MG tablet 1 tablet 2 to 3 times daily as needed for gout flare  Patient not taking: Reported on 11/26/2024 4/15/24   Rivas Beltran MD   azelastine (OPTIVAR) 0.05 % ophthalmic solution Place 1 drop into both eyes 2 times daily 11/10/23   Rivas Beltran MD   Aspirin Buf,CaCarb-MgCarb-MgO, 81 MG TABS Take 1 tablet by mouth daily    Provider,  MD Lizette   Omega-3 1000 MG CAPS Take 1 capsule by mouth daily  Patient not taking: Reported on 3/12/2025    Lizette Lima MD   vitamin D-3 (CHOLECALCIFEROL) 125 MCG (5000 UT) TABS Take 5,000 tablets by mouth daily  Patient not taking: Reported on 3/12/2025    Lizette Lima MD       Physical Exam: Need 8 Elements   Physical Exam  Vitals reviewed.   Constitutional:       Appearance: Normal appearance. He is obese.   HENT:      Head: Normocephalic.      Right Ear: Tympanic membrane normal.      Left Ear: Tympanic membrane normal.      Nose: Nose normal.      Mouth/Throat:      Mouth: Mucous membranes are moist.   Eyes:      Conjunctiva/sclera: Conjunctivae normal.      Pupils: Pupils are equal, round, and reactive to light.   Cardiovascular:      Rate and Rhythm: Normal rate and regular rhythm.      Pulses: Normal pulses.      Heart sounds: Normal heart sounds. No murmur heard.  Pulmonary:      Effort: Pulmonary effort is normal.      Breath sounds: Normal breath sounds. No wheezing, rhonchi or rales.   Abdominal:      General: Abdomen is flat. Bowel sounds are normal. There is no distension.      Palpations: Abdomen is soft.      Tenderness: There is no abdominal tenderness.   Musculoskeletal:         General: No deformity. Normal range of motion.      Cervical back: Normal range of motion and neck supple.      Right lower leg: No edema.      Left lower leg: No edema.   Skin:     Coloration: Skin is not jaundiced or pale.   Neurological:      General: No focal deficit present.      Mental Status: He is alert and oriented to person, place, and time. Mental status is at baseline.      Motor: Weakness present.        Past History:   PMHx   Past Medical History:   Diagnosis Date    Allergic rhinitis 05/24/2019    Atrial fibrillation (HCC)     Atrial fibrillation (HCC)     Cancer (HCC)     skin    Cardiomyopathy (HCC) 05/24/2019    Hilar lymphadenopathy 05/24/2019    calcified    History of left heart

## 2025-03-15 NOTE — ACP (ADVANCE CARE PLANNING)
CM had discussion on medical Power of  with pt. Pt stated his wife knows his wishes and wife Elizabeth Boyer is MPOA. Secondary is son Eric Boyer. Documentation was filled out and scanned into chart by registration. Copies given to pt.

## 2025-03-15 NOTE — ED PROVIDER NOTES
Emergency Department Encounter    Patient: Hay Boyer  MRN: [id number]  : 1950  Date of Evaluation: 3/15/2025  ED Provider:  Charles Mcgill MD    Triage Chief Complaint:   Shoulder Injury and Hematuria    [place]:  Hay Boyer is a 74 y.o. male who is morbidly obese with history of atrial fibrillation on Coumadin, cardiomyopathy, hypertension, obstructive sleep apnea, peripheral venous insufficiency and enlarged prostate status post Aqua ablation on second week of February.  Patient states he has been wearing Renner catheter which was taken out last Thursday.  He said he had an episode where he was passing large clots.  He has been doing okay until yesterday when he started to have large clots intermittently.  He states he is able to pass urine with no difficulties and does not need to abdominal or suprapubic distention or pain.  He states he has been recently diagnosed with UTI and is currently on cephalexin    Patient states he was walking across the yard yesterday afternoon when he got tripped and fell.  He is having some stiffness in the left shoulder.  He said he had a football injury during college through the shoulder and the range of motion is significantly diminished and he has a chronic stiffness with it.  He does not admit to neck pain or headache or pain in the chest abdomen, groin or lower extremity    ROS - see HPI, below listed is current ROS at time of my eval:  General:  No fevers, no chills, no weakness  Eyes:  No recent vison changes, no discharge  ENT:  No sore throat, no nasal congestion, no hearing changes  Cardiovascular:  No chest pain, no palpitations  Respiratory:  No shortness of breath, no cough, no wheezing  Gastrointestinal:  No pain, no nausea, no vomiting, no diarrhea  Musculoskeletal:  No muscle pain, no joint pain  Skin:  No rash, no pruritis, no easy bruising  Neurologic:  No speech problems, no headache, no extremity numbness, no extremity tingling, no extremity  440 k/uL    Neutrophils % 75 (H) 36 - 66 %    Lymphocytes % 14 (L) 24 - 44 %    Monocytes % 8 (H) 0 - 4 %    Eosinophils % 1 0.0 - 3.0 %    Basophils % 0 0 - 1 %    Immature Granulocytes % 2 (H) 0 %    Neutrophils Absolute 10.26 k/uL    Lymphocytes Absolute 1.93 k/uL    Monocytes Absolute 1.10 k/uL    Eosinophils Absolute 0.12 k/uL    Basophils Absolute 0.03 k/uL    Immature Granulocytes Absolute 0.27 k/uL   Protime-INR   Result Value Ref Range    Protime 23.2 (H) 11.7 - 14.5 sec    INR 2.0    Urinalysis   Result Value Ref Range    Color, UA Red (A) Yellow    Turbidity UA Turbid (A) Clear    Glucose, Ur  * Unable to report due to color interference. (A) NEGATIVE mg/dL    Bilirubin, Urine  * Unable to report due to color interference. (A) NEGATIVE    Ketones, Urine  * Unable to report due to color interference. (A) NEGATIVE mg/dL    Specific Gravity, UA * Unable to report due to color interference. 1.005 - 1.030    Urine Hgb  * Unable to report due to color interference. (A) NEGATIVE    pH, Urine  * Unable to report due to color interference. 5.0 - 8.0    Protein, UA  * Unable to report due to color interference. (A) NEGATIVE mg/dL    Urobilinogen, Urine  * Unable to report due to color interference. 0.2 - 1.0 EU/dL    Nitrite, Urine  * Unable to report due to color interference. (A) NEGATIVE    Leukocyte Esterase, Urine  * Unable to report due to color interference. (A) NEGATIVE   Microscopic Urinalysis   Result Value Ref Range    WBC, UA 34. The reference range for Urine WBC is 0 to 5. 0 TO 5 /HPF    RBC, UA  0 TO 2 /HPF     50404. The reference range for Urine RBC is 0 to 2.      Radiographs (if obtained):  Radiologist's Report Reviewed:  No results found.    EKG (if obtained): (All EKG's are interpreted by myself in the absence of a cardiologist)      MDM:  This patient had a Aqua Ablation recently by Dr. Toscano and had a Renner catheter placed.  He states he has been passing intermittent clots and Renner

## 2025-03-15 NOTE — PROGRESS NOTES
4 Eyes Skin Assessment     NAME:  Hay Boyer  YOB: 1950  MEDICAL RECORD NUMBER:  [id number]    The patient is being assessed for  Admission    I agree that at least one RN has performed a thorough Head to Toe Skin Assessment on the patient. ALL assessment sites listed below have been assessed.      Areas assessed by both nurses:    Head, Face, Ears, Shoulders, Back, Chest, Arms, Elbows, Hands, Sacrum. Buttock, Coccyx, Ischium, Legs. Feet and Heels, and Under Medical Devices         Does the Patient have a Wound? No noted wound(s)     Abrasion/scab to left knee  Oz Prevention initiated by RN: No  Wound Care Orders initiated by RN: No    Pressure Injury (Stage 3,4, Unstageable, DTI, NWPT, and Complex wounds) if present, place Wound referral order by RN under : No    New Ostomies, if present place, Ostomy referral order under : No     Nurse 1 eSignature: Electronically signed by Muriel Santana RN on 3/16/25 at 9:55 AM EDT    **SHARE this note so that the co-signing nurse can place an eSignature**    Nurse 2 eSignature: Electronically signed by Rita Emery RN on 3/16/25 at 9:50 AM EDT

## 2025-03-15 NOTE — CONSULTS
[address] [address]   Urology Consult Note  [facility] [id number] + [phone]    Date: 3/15/2025   Patient: Hay Boyer   : 1950   DOA: 3/15/2025   MRN: [id number]   ROOM#: [id number]/[id number]     Reason for Consult: Gross hematuria  Requesting Physician: Dr. Mcgill  Collaborating Urologist on Call at time of admission: Dr. Toscano  Chief Complaint: Dysuria, hematuria, clots, retention    History Obtained From: patient, electronic medical record    HISTORY OF PRESENT ILLNESS:                The patient is a 74 y.o. male with significant past medical history of A-fib on warfarin, cardiomyopathy, hypertension, hyperlipidemia, SEDRICK, and BPH with LUTS who presented with gross hematuria and clots.  Patient known to our group, recently underwent Aquablation 2/10/2025 with Dr. Toscano.  Postop he did have some hematuria and Renner was placed in our office earlier last week.  Renner was removed yesterday in office and patient had been doing well with clear yellow urine.  He endorses a fall at home yesterday after getting tangled up and tripping.  Later on, he noted having gross hematuria with clots again.  Of note, patient was recently treated for UTI through our office with urine culture from the ER 3/10/25 growing Klebsiella.  Patient presented to the ER this morning and had Renner placed with large volume of grossly bloody urine returned and some clots noted.  See procedure note regarding three-way Renner placement.  Patient admitted for further management.    Past Medical History:        Diagnosis Date    Allergic rhinitis 2019    Atrial fibrillation (HCC)     Atrial fibrillation (HCC)     Cancer (HCC)     skin    Cardiomyopathy (HCC) 2019    Hilar lymphadenopathy 2019    calcified    History of left heart catheterization (LHC) 2025    Hyperlipidemia 2019    Hypertension     Impotence 2019    Obstructive sleep apnea syndrome 2019    Peripheral venous         Imaging:  No results found.    Assessment & Plan:      Hay Boyer is a 74 y.o. male with PMHx of A-fib on warfarin, cardiomyopathy, hypertension, hyperlipidemia, SEDRICK, and BPH with LUTS admitted 3/15/2025 for gross hematuria with clot retention    1) Gross hematuria with clot retention: S/p Aquablation with cystolitholapaxy and diagnostic right ureteroscopy 2/10/2025 for treatment of BPH with LUTS.  Had episode of gross hematuria recently and had Renner placed in our office early last week which was removed yesterday.  Urine had been clear yellow until last night.   Warfarin is held. Monitor hemoglobin.   CT abdomen/pelvis is pending   22F 3-way Renner placed today and started on CBI.  Continue CBI and wean as able to maintain clear-light pink output in tubing.  Manually irrigate as needed for clots.    2) Complicated UTI: Recently treated through our office for Klebsiella UTI   UA could not be run due to hematuria - recommend urine culture   Afebrile.  WBC 13.7.  On Rocephin.    Patient seen and examined, chart reviewed.   Electronically signed by DOMO Collins on 3/15/2025 at 4:23 PM

## 2025-03-15 NOTE — PROGRESS NOTES
ED TO INPATIENT SBAR HANDOFF    Patient Name: Hay Boyer   :  1950  74 y.o.   Preferred Name  Hay  Family/Caregiver Present yes   Restraints no   C-SSRS: Risk of Suicide: No Risk  Sitter no   Sepsis Risk Score        Situation  Chief Complaint   Patient presents with    Shoulder Injury    Hematuria     Brief Description of Patient's Condition: Pt arrived to ER for c/o fall. Pt denies injury from fall. No imaging was performed for shoulder complaint. Pt more concerned with difficulty urinating due to blood clots, continued issue from recent procedure and warfarin. Hgb has dropped. Urology at bedside to place 3 way soler for CBI.   Note that patient does wear CPAP while sleeping- wife is bringing CPAP from home  Mental Status: oriented, alert, coherent, logical, thought processes intact, and able to concentrate and follow conversation  Arrived from: home    Imaging:   CT ABDOMEN PELVIS W IV CONTRAST Additional Contrast? None    (Results Pending)     Abnormal labs:   Abnormal Labs Reviewed   CBC WITH AUTO DIFFERENTIAL - Abnormal; Notable for the following components:       Result Value    WBC 13.7 (*)     RBC 3.93 (*)     Hemoglobin 11.7 (*)     Hematocrit 35.6 (*)     MPV 13.8 (*)     Neutrophils % 75 (*)     Lymphocytes % 14 (*)     Monocytes % 8 (*)     Immature Granulocytes % 2 (*)     All other components within normal limits   PROTIME-INR - Abnormal; Notable for the following components:    Protime 23.2 (*)     All other components within normal limits   URINALYSIS - Abnormal; Notable for the following components:    Color, UA Red (*)     Turbidity UA Turbid (*)     Glucose, Ur  * Unable to report due to color interference. (*)     Bilirubin, Urine  * Unable to report due to color interference. (*)     Ketones, Urine  * Unable to report due to color interference. (*)     Urine Hgb  * Unable to report due to color interference. (*)     Protein, UA  * Unable to report due to color interference. (*)   RN on 3/15/2025 at 1:15 PM

## 2025-03-15 NOTE — ED TRIAGE NOTES
Patient arrived to ED via walk in. Patient states he had a stone removed the second week of February and is now passing large clots and having hematuria. Patient states he fell and landed gisele his left shoulder. Patient states he is on blood thinners. Dr. Mcgill notified and states no trauma alert at this time. Emmanuel A&O x4. Resp even and unlabored.

## 2025-03-16 LAB
ANION GAP SERPL CALCULATED.3IONS-SCNC: 11 MMOL/L (ref 9–17)
BUN SERPL-MCNC: 16 MG/DL (ref 7–20)
CALCIUM SERPL-MCNC: 8.5 MG/DL (ref 8.3–10.6)
CHLORIDE SERPL-SCNC: 100 MMOL/L (ref 99–110)
CO2 SERPL-SCNC: 24 MMOL/L (ref 21–32)
CREAT SERPL-MCNC: 1 MG/DL (ref 0.8–1.3)
ERYTHROCYTE [DISTWIDTH] IN BLOOD BY AUTOMATED COUNT: 13.8 % (ref 11.7–14.9)
GFR, ESTIMATED: 76 ML/MIN/1.73M2
GLUCOSE SERPL-MCNC: 188 MG/DL (ref 74–99)
HCT VFR BLD AUTO: 34.9 % (ref 42–52)
HGB BLD-MCNC: 11.6 G/DL (ref 13.5–18)
MAGNESIUM SERPL-MCNC: 1.6 MG/DL (ref 1.8–2.4)
MCH RBC QN AUTO: 29.9 PG (ref 27–31)
MCHC RBC AUTO-ENTMCNC: 33.2 G/DL (ref 32–36)
MCV RBC AUTO: 89.9 FL (ref 78–100)
PHOSPHATE SERPL-MCNC: 3 MG/DL (ref 2.5–4.9)
PLATELET, FLUORESCENCE: 173 K/UL (ref 140–440)
PMV BLD AUTO: 13.1 FL (ref 7.5–11.1)
POTASSIUM SERPL-SCNC: 4.1 MMOL/L (ref 3.5–5.1)
RBC # BLD AUTO: 3.88 M/UL (ref 4.6–6.2)
SODIUM SERPL-SCNC: 135 MMOL/L (ref 136–145)
WBC OTHER # BLD: 12.4 K/UL (ref 4–10.5)

## 2025-03-16 PROCEDURE — 80048 BASIC METABOLIC PNL TOTAL CA: CPT

## 2025-03-16 PROCEDURE — 6360000002 HC RX W HCPCS: Performed by: STUDENT IN AN ORGANIZED HEALTH CARE EDUCATION/TRAINING PROGRAM

## 2025-03-16 PROCEDURE — 36415 COLL VENOUS BLD VENIPUNCTURE: CPT

## 2025-03-16 PROCEDURE — 6370000000 HC RX 637 (ALT 250 FOR IP): Performed by: STUDENT IN AN ORGANIZED HEALTH CARE EDUCATION/TRAINING PROGRAM

## 2025-03-16 PROCEDURE — 84100 ASSAY OF PHOSPHORUS: CPT

## 2025-03-16 PROCEDURE — 94761 N-INVAS EAR/PLS OXIMETRY MLT: CPT

## 2025-03-16 PROCEDURE — 83735 ASSAY OF MAGNESIUM: CPT

## 2025-03-16 PROCEDURE — 85027 COMPLETE CBC AUTOMATED: CPT

## 2025-03-16 PROCEDURE — 2140000000 HC CCU INTERMEDIATE R&B

## 2025-03-16 PROCEDURE — 2500000003 HC RX 250 WO HCPCS: Performed by: STUDENT IN AN ORGANIZED HEALTH CARE EDUCATION/TRAINING PROGRAM

## 2025-03-16 RX ADMIN — SODIUM CHLORIDE, PRESERVATIVE FREE 10 ML: 5 INJECTION INTRAVENOUS at 08:37

## 2025-03-16 RX ADMIN — SODIUM CHLORIDE, PRESERVATIVE FREE 10 ML: 5 INJECTION INTRAVENOUS at 19:56

## 2025-03-16 RX ADMIN — WATER 1000 MG: 1 INJECTION INTRAMUSCULAR; INTRAVENOUS; SUBCUTANEOUS at 16:14

## 2025-03-16 RX ADMIN — HYDROCHLOROTHIAZIDE 25 MG: 25 TABLET ORAL at 08:35

## 2025-03-16 RX ADMIN — AMLODIPINE BESYLATE 5 MG: 5 TABLET ORAL at 08:35

## 2025-03-16 RX ADMIN — MAGNESIUM SULFATE HEPTAHYDRATE 2000 MG: 40 INJECTION, SOLUTION INTRAVENOUS at 12:48

## 2025-03-16 RX ADMIN — LOSARTAN POTASSIUM 100 MG: 100 TABLET, FILM COATED ORAL at 08:35

## 2025-03-16 ASSESSMENT — PAIN SCALES - GENERAL
PAINLEVEL_OUTOF10: 0
PAINLEVEL_OUTOF10: 0

## 2025-03-16 NOTE — PROGRESS NOTES
Paged by RN about pressure and poor drainage off CBI  She manually irrigated and cleared a clot with urine draining behind this. She restarted CBI at that time  I manually irrigated returning ~10cc of clots and urine quickly cleared  Restarted on CBI temporarily which can be weaned this afternoon

## 2025-03-16 NOTE — PROGRESS NOTES
[address]  Progress Note 0 1 2      Date: 3/16/2025   Patient: Hay Boyer   : 1950   DOA: 3/15/2025   MRN: [id number]   ROOM#: [id number]/3119-A     Subjective     I feel well.     Objective     AF/VSS    CBC:   Lab Results   Component Value Date/Time    WBC 13.7 03/15/2025 09:25 AM    RBC 3.93 03/15/2025 09:25 AM    HGB 11.7 03/15/2025 09:25 AM    HCT 35.6 03/15/2025 09:25 AM    MCV 90.6 03/15/2025 09:25 AM    MCH 29.8 03/15/2025 09:25 AM    MCHC 32.9 03/15/2025 09:25 AM    RDW 13.4 03/15/2025 09:25 AM     2025 03:05 PM    MPV 13.8 03/15/2025 09:25 AM     BMP:    Lab Results   Component Value Date/Time     03/15/2025 02:15 PM    K 4.1 03/15/2025 02:15 PM     03/15/2025 02:15 PM    CO2 18 03/15/2025 02:15 PM    BUN 17 03/15/2025 02:15 PM    CREATININE 0.9 03/15/2025 02:15 PM    CALCIUM 9.0 03/15/2025 02:15 PM    GFRAA >60 2022 02:56 PM    LABGLOM 79 03/15/2025 02:15 PM    LABGLOM >60 2024 10:44 AM    GLUCOSE 132 03/15/2025 02:15 PM       Imaging Studies:     CT ABDOMEN PELVIS W IV CONTRAST Additional Contrast? None [[id number]]    Collected: 03/15/25 1714    Updated: 03/15/25 182    Order Status: Completed    Narrative:     PROCEDURE: CT ABDOMEN PELVIS W IV CONTRAST    DATE OF EXAM:  3/15/2025 17:44    DEMOGRAPHICS: 74 years old Male    INDICATION: Gross hematuria with urinary obstruction        COMPARISON: No existing relevant imaging study corresponding to the same  anatomical region is available.    TECHNIQUE: Contiguous axial slices of the abdomen and pelvis were submitted  without the IV administration of contrast. No oral contrast was utilized.  Additional coronal reformatted images were submitted.      DOSE OPTIMIZATION: CT radiation dose optimization techniques (automated exposure   control, and use of iterative reconstruction techniques, or adjustment of the  mA and/or kV according to patient size) were used to limit

## 2025-03-16 NOTE — NURSE NAVIGATOR
7207 Bedside report received from outgoing nurse: Muriel. Patient is in bed AAO X 4 . Safety measures in place. Continuous bladder irrigation in place     2000 Initial assessment completed    0000 Focus assessment completed    0400 Focus assessment completed

## 2025-03-16 NOTE — DISCHARGE INSTR - DIET

## 2025-03-17 ENCOUNTER — TELEPHONE (OUTPATIENT)
Dept: FAMILY MEDICINE CLINIC | Age: 75
End: 2025-03-17

## 2025-03-17 LAB
ANION GAP SERPL CALCULATED.3IONS-SCNC: 9 MMOL/L (ref 9–17)
BUN SERPL-MCNC: 15 MG/DL (ref 7–20)
CALCIUM SERPL-MCNC: 8.7 MG/DL (ref 8.3–10.6)
CHLORIDE SERPL-SCNC: 100 MMOL/L (ref 99–110)
CO2 SERPL-SCNC: 27 MMOL/L (ref 21–32)
CREAT SERPL-MCNC: 0.9 MG/DL (ref 0.8–1.3)
ERYTHROCYTE [DISTWIDTH] IN BLOOD BY AUTOMATED COUNT: 13.9 % (ref 11.7–14.9)
GFR, ESTIMATED: 88 ML/MIN/1.73M2
GLUCOSE SERPL-MCNC: 155 MG/DL (ref 74–99)
HCT VFR BLD AUTO: 35.4 % (ref 42–52)
HGB BLD-MCNC: 11.7 G/DL (ref 13.5–18)
MAGNESIUM SERPL-MCNC: 1.7 MG/DL (ref 1.8–2.4)
MCH RBC QN AUTO: 30.5 PG (ref 27–31)
MCHC RBC AUTO-ENTMCNC: 33.1 G/DL (ref 32–36)
MCV RBC AUTO: 92.2 FL (ref 78–100)
PHOSPHATE SERPL-MCNC: 2.9 MG/DL (ref 2.5–4.9)
PLATELET # BLD AUTO: 201 K/UL (ref 140–440)
PMV BLD AUTO: 12.6 FL (ref 7.5–11.1)
POTASSIUM SERPL-SCNC: 4 MMOL/L (ref 3.5–5.1)
RBC # BLD AUTO: 3.84 M/UL (ref 4.6–6.2)
SODIUM SERPL-SCNC: 135 MMOL/L (ref 136–145)
WBC OTHER # BLD: 15.2 K/UL (ref 4–10.5)

## 2025-03-17 PROCEDURE — 94761 N-INVAS EAR/PLS OXIMETRY MLT: CPT

## 2025-03-17 PROCEDURE — 83735 ASSAY OF MAGNESIUM: CPT

## 2025-03-17 PROCEDURE — 2500000003 HC RX 250 WO HCPCS: Performed by: STUDENT IN AN ORGANIZED HEALTH CARE EDUCATION/TRAINING PROGRAM

## 2025-03-17 PROCEDURE — 80048 BASIC METABOLIC PNL TOTAL CA: CPT

## 2025-03-17 PROCEDURE — 2140000000 HC CCU INTERMEDIATE R&B

## 2025-03-17 PROCEDURE — 84100 ASSAY OF PHOSPHORUS: CPT

## 2025-03-17 PROCEDURE — 85027 COMPLETE CBC AUTOMATED: CPT

## 2025-03-17 PROCEDURE — 6370000000 HC RX 637 (ALT 250 FOR IP): Performed by: STUDENT IN AN ORGANIZED HEALTH CARE EDUCATION/TRAINING PROGRAM

## 2025-03-17 PROCEDURE — 36415 COLL VENOUS BLD VENIPUNCTURE: CPT

## 2025-03-17 PROCEDURE — 6360000002 HC RX W HCPCS: Performed by: STUDENT IN AN ORGANIZED HEALTH CARE EDUCATION/TRAINING PROGRAM

## 2025-03-17 RX ADMIN — AMLODIPINE BESYLATE 5 MG: 5 TABLET ORAL at 08:20

## 2025-03-17 RX ADMIN — SODIUM CHLORIDE, PRESERVATIVE FREE 10 ML: 5 INJECTION INTRAVENOUS at 20:52

## 2025-03-17 RX ADMIN — HYDROCHLOROTHIAZIDE 25 MG: 25 TABLET ORAL at 08:20

## 2025-03-17 RX ADMIN — LOSARTAN POTASSIUM 100 MG: 100 TABLET, FILM COATED ORAL at 08:20

## 2025-03-17 RX ADMIN — ACETAMINOPHEN 650 MG: 325 TABLET ORAL at 15:33

## 2025-03-17 RX ADMIN — SODIUM CHLORIDE, PRESERVATIVE FREE 10 ML: 5 INJECTION INTRAVENOUS at 08:20

## 2025-03-17 RX ADMIN — WATER 1000 MG: 1 INJECTION INTRAMUSCULAR; INTRAVENOUS; SUBCUTANEOUS at 15:31

## 2025-03-17 ASSESSMENT — PAIN SCALES - GENERAL
PAINLEVEL_OUTOF10: 0

## 2025-03-17 NOTE — TELEPHONE ENCOUNTER
To Dr. Schumacher-    Requesting Home Care    Skilled Nursing    Physical Therapy    Occupational Therapy

## 2025-03-17 NOTE — CARE COORDINATION
Called pcp office for hhc order..awaiting return call.   1638 Received call from Holly at [facility] office with verbal for hhc at [place].

## 2025-03-17 NOTE — PLAN OF CARE
Problem: Chronic Conditions and Co-morbidities  Goal: Patient's chronic conditions and co-morbidity symptoms are monitored and maintained or improved  3/17/2025 1057 by Bola Yeung RN  Outcome: Progressing  Flowsheets (Taken 3/17/2025 1057)  Care Plan - Patient's Chronic Conditions and Co-Morbidity Symptoms are Monitored and Maintained or Improved:   Monitor and assess patient's chronic conditions and comorbid symptoms for stability, deterioration, or improvement   Collaborate with multidisciplinary team to address chronic and comorbid conditions and prevent exacerbation or deterioration   Update acute care plan with appropriate goals if chronic or comorbid symptoms are exacerbated and prevent overall improvement and discharge  3/17/2025 0007 by Krunal Villasenor RN  Outcome: Progressing     Problem: Discharge Planning  Goal: Discharge to home or other facility with appropriate resources  3/17/2025 1057 by Bola Yeung RN  Outcome: Progressing  Flowsheets (Taken 3/17/2025 1057)  Discharge to home or other facility with appropriate resources:   Identify barriers to discharge with patient and caregiver   Identify discharge learning needs (meds, wound care, etc)   Refer to discharge planning if patient needs post-hospital services based on physician order or complex needs related to functional status, cognitive ability or social support system  3/17/2025 0007 by Krunal Villasenor RN  Outcome: Progressing     Problem: Pain  Goal: Verbalizes/displays adequate comfort level or baseline comfort level  3/17/2025 1057 by Bola Yeung, RN  Outcome: Progressing  Flowsheets (Taken 3/17/2025 1057)  Verbalizes/displays adequate comfort level or baseline comfort level:   Encourage patient to monitor pain and request assistance   Consider cultural and social influences on pain and pain management  3/17/2025 0007 by Krunal Villasenor RN  Outcome: Progressing     Problem: Safety - Adult  Goal: Free from fall injury  3/17/2025 1057 by  Bola Yeung, RN  Outcome: Progressing  Flowsheets (Taken 3/17/2025 1057)  Free From Fall Injury:   Instruct family/caregiver on patient safety   Based on caregiver fall risk screen, instruct family/caregiver to ask for assistance with transferring infant if caregiver noted to have fall risk factors  3/17/2025 0007 by Krunal Villasenor, RN  Outcome: Progressing

## 2025-03-17 NOTE — PROGRESS NOTES
V2.0  [facility] Hospitalist Progress Note      Name:  Hay Boyer /Age/Sex: 1950  (74 y.o. male)   MRN & CSN:  [id number] & [id number] Encounter Date/Time: 3/16/2025 9:11 PM EDT    Location:  [facility]/3119-A PCP: Rivas Beltran MD       Hospital Day: 2    Assessment and Plan:   Hay Boyer is a 74 y.o. male  who presents with Hematuria     Hospital Problems             Last Modified POA     * (Principal) Hematuria 3/15/2025 Yes      Assessment and Plan:  Gross hematuria plan for CBI.  Urology on board for three-way catheter placement.  No need for n.p.o. advance diet as tolerated telemetry in place admitted to PCU.  Monitor hemoglobin transfuse to keep hemoglobin above 7 prophylactic Rocephin no need for cultures  Leukocytosis likely reactive in the setting of above  History of atrial fibrillation on warfarin due to cost patient previously was on Xarelto.  Hold off on warfarin acute kidney failure last INR was 2.3.  Check INR every other day  Benign essential hypertension continue home medication including losartan hydrochlorothiazide and amlodipine  Chronic gout on colchicine at home which is on hold because of nephrotoxic effects  Obstructive sleep apnea continue with home CPAP  Anemia in the setting of multiple continue to monitor if further anemia develops consider iron studies for now Mentzer index is above 13 MCV is normal does not meet criteria for iron deficiency at the moment    Medical Decision Making:  The following items were considered in medical decision making:  Discussion of patient care with other providers  Reviewed clinical lab tests if any  Reviewed radiology tests if any  Reviewed other diagnostic tests/interventions  Independent review of radiologic images if any  Microbiology cultures and other micro tests if any    Estimated time spent for medical decision-making encompassing complexity of the case, history taking, medication review, physical examination, communication with  grammar, punctuation, and spelling, as well as words and phrases that may be inappropriate. If there are any questions or concerns please feel free to contact the dictating provider for clarification.   Diet ADULT DIET; Regular; 5 carb choices (75 gm/meal)   DVT Prophylaxis [] Lovenox, []  Heparin, [] SCDs, [] Ambulation,  [] Eliquis, [] Xarelto  [] Coumadin   Code Status Full Code   Disposition From: home  Expected Disposition: home  Estimated Date of Discharge: tomorrow  Patient requires continued admission due to gross hematuria   Surrogate Decision Maker/ POA      Subjective:     Chief Complaint: Shoulder Injury and Hematuria     The patient was seen at bedside. All questions answered at bedside. NAEON. Tolerating diet. No BM last night. Good UOP.  CBI might be started again patient still has catheter follow-up blood it is becoming more pinkish in color     Patient also discussed about watchman patient has been told in the past that the patient does not qualify for Watchman device it was further discussed with the patient in detail that if recurrent episodes of hematuria or any other bleeding episodes happen patient would qualify in the future for Watchman device but the only caveat is he has to be stable enough to take anticoagulation for a couple of months before and after the procedure since the patient is not stable enough to do so at the moment I do not think patient qualifies at the moment but might be able to do so in the future    Review of Systems:    Review of Systems  Negative except above    Objective:     Intake/Output Summary (Last 24 hours) at 3/16/2025 2111  Last data filed at 3/16/2025 1833  Gross per 24 hour   Intake 480 ml   Output 4375 ml   Net -3895 ml        Vitals:   Vitals:    03/16/25 1945   BP: (!) 109/53   Pulse: 80   Resp: 14   Temp: 98.2 °F (36.8 °C)   SpO2: 98%       Physical Exam:   Physical Exam  Vitals reviewed.   Constitutional:       Appearance: Normal appearance. He is normal

## 2025-03-17 NOTE — PROGRESS NOTES
[address] [address]   Progress Note  [facility] [id number] + [id number]      Date: 3/17/2025   Patient: Hay Boyer   : 1950   DOA: 3/15/2025   MRN: [id number]   ROOM#: 3119/3119-A     Admit Date: 3/15/2025     Collaborating Urologist on Call at time of admission: Dr. Toscano   CC: Dysuria, hematuria, clots, retention   Reason for Consult: Gross hematuria     Subjective:     Pain: none, no nausea, no vomiting  Bowel Movement/Flatus: Yes  Voiding: 3-way catheter in place, urine clear on low flow CBI     Pt resting in bed, reports feeling well today. I manually irrigated his bladder with 150cc NS with clear return and no clots.    Objective:    Vitals:   /78   Pulse 99   Temp 99.8 °F (37.7 °C) (Oral)   Resp 17   Ht 1.905 m (6' 3\")   Wt (!) 139.4 kg (307 lb 6.4 oz)   SpO2 95%   BMI 38.42 kg/m²   Temp  Av.8 °F (37.1 °C)  Min: 97.8 °F (36.6 °C)  Max: 99.8 °F (37.7 °C)    Intake/Output Summary (Last 24 hours) at 3/17/2025 1021  Last data filed at 3/17/2025 0853  Gross per 24 hour   Intake 970 ml   Output 1750 ml   Net -780 ml       Physical Exam:  General appearance: alert, appears stated age, cooperative, and no distress  Head: Normocephalic, without obvious abnormality, atraumatic  Back: No CVA tenderness  Lungs: Breathing unlabored  Abdomen: Soft, nontender, nondistended  : 22F 3-way Renner in place and draining  Extremities: No BLE edema  Neurologic: Grossly normal    Labs:  WBC:    Lab Results   Component Value Date/Time    WBC 15.2 2025 04:58 AM     Hemoglobin/Hematocrit:    Lab Results   Component Value Date/Time    HGB 11.7 2025 04:58 AM    HCT 35.4 2025 04:58 AM     BMP:   Lab Results   Component Value Date/Time     2025 04:58 AM    K 4.0 2025 04:58 AM     2025 04:58 AM    CO2 27 2025 04:58 AM    BUN 15 2025 04:58 AM    CREATININE 0.9 2025 04:58 AM    CALCIUM 8.7 2025 04:58 AM    GFRAA >60 2022  are normal. Bones: Degenerative changes are noted within the visualized spine. IMPRESSION: 1.  No acute intra-abdominal or pelvic process. This dictation was created with voice recognition software.  While attempts have  been made to review the dictation as it is transcribed, on occasion the spoken word can be misinterpreted by the technology leading to omissions or inappropriate words, phrases or sentences.  Dictated and Electronically Signed By: Ariana Toro DO 3/15/2025 18:20          Assessment & Plan:      Hay Boyer is a 74 y.o. male with PMHx of A-fib on warfarin, cardiomyopathy, hypertension, hyperlipidemia, SEDRICK, and BPH with LUTS admitted 3/15/2025 for gross hematuria with clot retention     1) Gross hematuria with clot retention: S/p Aquablation with cystolitholapaxy and diagnostic right ureteroscopy 2/10/2025 for treatment of BPH with LUTS.  Had episode of gross hematuria recently and had Soler placed in our office early 2wks ago that was removed for a TOV last week. Urine was clear until he sustained a fall at home.              Warfarin is held. Hemoglobin 11.7 yesterday, was 14 3/10/25              CT abdomen/pelvis w/o acute findings              22F 3-way Soler placed 3/15/25 and started on CBI.   Urine clear today on low flow CBI. CBI clamped. If remains clear by tomorrow morning, recommend soler removal/voiding trial. Please notify us if PVR >250cc.     2) Complicated UTI: Urine cx 3/10/25 +Klebsiella pneumoniae >100K CFU/ml              UA could not be run due to hematuria - recommend urine culture.              WBC 15.2, T-max 99.8 this morning. On IV Rocephin; abx per primary.    Will follow. Pt to follow up with Dr. Toscano next week for reevaluation.  Patient seen and examined, chart reviewed.     Electronically signed by Jagdeep Mckeon PA-C on 3/17/2025 at 10:21 AM

## 2025-03-18 LAB
ANION GAP SERPL CALCULATED.3IONS-SCNC: 9 MMOL/L (ref 9–17)
BUN SERPL-MCNC: 17 MG/DL (ref 7–20)
CALCIUM SERPL-MCNC: 8.5 MG/DL (ref 8.3–10.6)
CHLORIDE SERPL-SCNC: 100 MMOL/L (ref 99–110)
CO2 SERPL-SCNC: 27 MMOL/L (ref 21–32)
CREAT SERPL-MCNC: 0.9 MG/DL (ref 0.8–1.3)
ERYTHROCYTE [DISTWIDTH] IN BLOOD BY AUTOMATED COUNT: 14.2 % (ref 11.7–14.9)
GFR, ESTIMATED: 81 ML/MIN/1.73M2
GLUCOSE SERPL-MCNC: 138 MG/DL (ref 74–99)
HCT VFR BLD AUTO: 34.4 % (ref 42–52)
HGB BLD-MCNC: 11.1 G/DL (ref 13.5–18)
MAGNESIUM SERPL-MCNC: 1.8 MG/DL (ref 1.8–2.4)
MCH RBC QN AUTO: 30.2 PG (ref 27–31)
MCHC RBC AUTO-ENTMCNC: 32.3 G/DL (ref 32–36)
MCV RBC AUTO: 93.5 FL (ref 78–100)
PHOSPHATE SERPL-MCNC: 3.2 MG/DL (ref 2.5–4.9)
PLATELET # BLD AUTO: 241 K/UL (ref 140–440)
PMV BLD AUTO: 12.1 FL (ref 7.5–11.1)
POTASSIUM SERPL-SCNC: 4.1 MMOL/L (ref 3.5–5.1)
RBC # BLD AUTO: 3.68 M/UL (ref 4.6–6.2)
SODIUM SERPL-SCNC: 136 MMOL/L (ref 136–145)
WBC OTHER # BLD: 16.4 K/UL (ref 4–10.5)

## 2025-03-18 PROCEDURE — 2140000000 HC CCU INTERMEDIATE R&B

## 2025-03-18 PROCEDURE — 84100 ASSAY OF PHOSPHORUS: CPT

## 2025-03-18 PROCEDURE — 85027 COMPLETE CBC AUTOMATED: CPT

## 2025-03-18 PROCEDURE — 36415 COLL VENOUS BLD VENIPUNCTURE: CPT

## 2025-03-18 PROCEDURE — 80048 BASIC METABOLIC PNL TOTAL CA: CPT

## 2025-03-18 PROCEDURE — 94761 N-INVAS EAR/PLS OXIMETRY MLT: CPT

## 2025-03-18 PROCEDURE — 83735 ASSAY OF MAGNESIUM: CPT

## 2025-03-18 PROCEDURE — 6370000000 HC RX 637 (ALT 250 FOR IP): Performed by: STUDENT IN AN ORGANIZED HEALTH CARE EDUCATION/TRAINING PROGRAM

## 2025-03-18 PROCEDURE — 2500000003 HC RX 250 WO HCPCS: Performed by: STUDENT IN AN ORGANIZED HEALTH CARE EDUCATION/TRAINING PROGRAM

## 2025-03-18 PROCEDURE — 2580000003 HC RX 258: Performed by: STUDENT IN AN ORGANIZED HEALTH CARE EDUCATION/TRAINING PROGRAM

## 2025-03-18 PROCEDURE — 6360000002 HC RX W HCPCS: Performed by: STUDENT IN AN ORGANIZED HEALTH CARE EDUCATION/TRAINING PROGRAM

## 2025-03-18 RX ORDER — SODIUM CHLORIDE, SODIUM LACTATE, POTASSIUM CHLORIDE, AND CALCIUM CHLORIDE .6; .31; .03; .02 G/100ML; G/100ML; G/100ML; G/100ML
500 INJECTION, SOLUTION INTRAVENOUS ONCE
Status: COMPLETED | OUTPATIENT
Start: 2025-03-18 | End: 2025-03-18

## 2025-03-18 RX ORDER — METOPROLOL TARTRATE 25 MG/1
25 TABLET, FILM COATED ORAL 2 TIMES DAILY
Qty: 60 TABLET | Refills: 3 | Status: SHIPPED | OUTPATIENT
Start: 2025-03-18

## 2025-03-18 RX ORDER — METOPROLOL TARTRATE 25 MG/1
25 TABLET, FILM COATED ORAL 2 TIMES DAILY
Status: DISCONTINUED | OUTPATIENT
Start: 2025-03-18 | End: 2025-03-19 | Stop reason: HOSPADM

## 2025-03-18 RX ADMIN — METOPROLOL TARTRATE 25 MG: 25 TABLET, FILM COATED ORAL at 09:02

## 2025-03-18 RX ADMIN — SODIUM CHLORIDE, PRESERVATIVE FREE 10 ML: 5 INJECTION INTRAVENOUS at 20:52

## 2025-03-18 RX ADMIN — METOPROLOL TARTRATE 25 MG: 25 TABLET, FILM COATED ORAL at 20:52

## 2025-03-18 RX ADMIN — WATER 1000 MG: 1 INJECTION INTRAMUSCULAR; INTRAVENOUS; SUBCUTANEOUS at 16:57

## 2025-03-18 RX ADMIN — SODIUM CHLORIDE, PRESERVATIVE FREE 10 ML: 5 INJECTION INTRAVENOUS at 08:59

## 2025-03-18 RX ADMIN — SODIUM CHLORIDE, SODIUM LACTATE, POTASSIUM CHLORIDE, AND CALCIUM CHLORIDE 500 ML: .6; .31; .03; .02 INJECTION, SOLUTION INTRAVENOUS at 09:00

## 2025-03-18 ASSESSMENT — PAIN SCALES - GENERAL
PAINLEVEL_OUTOF10: 0

## 2025-03-18 NOTE — PROGRESS NOTES
[facility] [address]   Progress Note  [facility] [address]      Date: 3/18/2025   Patient: Hay Boyer   : 1950   DOA: 3/15/2025   MRN: [id number]   ROOM#: 3119/3119-A     Admit Date: 3/15/2025     Collaborating Urologist on Call at time of admission: Dr. Toscano   CC: Dysuria, hematuria, clots, retention   Reason for Consult: Gross hematuria     Subjective:     Pain: none, no nausea, no vomiting  Bowel Movement/Flatus: Yes  Voiding: 3-way catheter in place, urine clear and CBI clamped    Pt resting in bed, reports feeling well today. OK with soler removal/voiding trial.    Objective:    Vitals:   /65   Pulse 84   Temp 97.7 °F (36.5 °C) (Oral)   Resp 20   Ht 1.905 m (6' 3\")   Wt (!) 139.4 kg (307 lb 6.4 oz)   SpO2 98%   BMI 38.42 kg/m²   Temp  Av.2 °F (37.3 °C)  Min: 97.7 °F (36.5 °C)  Max: 100.4 °F (38 °C)    Intake/Output Summary (Last 24 hours) at 3/18/2025 0744  Last data filed at 3/18/2025 0532  Gross per 24 hour   Intake 790 ml   Output 1750 ml   Net -960 ml       Physical Exam:  General appearance: alert, appears stated age, cooperative, and no distress  Head: Normocephalic, without obvious abnormality, atraumatic  Back: No CVA tenderness  Lungs: Breathing unlabored  Abdomen: Soft, nontender, nondistended  : 22fr 3-way catheter in place, urine clear and CBI clamped  Extremities: No BLE edema  Neurologic: Grossly normal    Labs:  WBC:    Lab Results   Component Value Date/Time    WBC 16.4 2025 04:02 AM     Hemoglobin/Hematocrit:    Lab Results   Component Value Date/Time    HGB 11.1 2025 04:02 AM    HCT 34.4 2025 04:02 AM     BMP:   Lab Results   Component Value Date/Time     2025 04:02 AM    K 4.1 2025 04:02 AM     2025 04:02 AM    CO2 27 2025 04:02 AM    BUN 17 2025 04:02 AM    CREATININE 0.9 2025 04:02 AM    CALCIUM 8.5 2025 04:02 AM    GFRAA >60 2022 02:56 PM    LABGLOM 81  Bones: Degenerative changes are noted within the visualized spine. IMPRESSION: 1.  No acute intra-abdominal or pelvic process. This dictation was created with voice recognition software.  While attempts have  been made to review the dictation as it is transcribed, on occasion the spoken word can be misinterpreted by the technology leading to omissions or inappropriate words, phrases or sentences.  Dictated and Electronically Signed By: Ariana Toro DO 3/15/2025 18:20          Assessment & Plan:      Hay Boyer is a 74 y.o. male with PMHx of A-fib on warfarin, cardiomyopathy, hypertension, hyperlipidemia, SEDRICK, and BPH with LUTS admitted 3/15/2025 for gross hematuria with clot retention     1) Gross hematuria with clot retention: S/p Aquablation with cystolitholapaxy and diagnostic right ureteroscopy 2/10/2025 for treatment of BPH with LUTS.  Had episode of gross hematuria recently and had Renner placed in our office early 2wks ago that was removed for a TOV last week. Urine was clear until he sustained a fall at home.              Warfarin is held. Hgb 11.1.              CT abdomen/pelvis w/o acute findings              22F 3-way Renner placed 3/15/25 and started on CBI.   Urine clear today and CBI clamped. Recommend catheter removal/voiding trial today. Please notify us if PVR >250cc.     2) Complicated UTI: Urine cx 3/10/25 +Klebsiella pneumoniae >100K CFU/ml              UA could not be run due to hematuria - recommend urine culture.              WBC 16.4, T-max 100.4 yesterday afternoon. On IV Rocephin; abx per primary.    Will follow. Pt to follow up with Dr. Toscano next week for reevaluation.  Patient seen and examined, chart reviewed.     Electronically signed by Jagdeep Mckeon PA-C on 3/18/2025 at 7:44 AM

## 2025-03-18 NOTE — PROGRESS NOTES
Pt is wearing his own Cpap machine.    Hr 72, RR 16 Pt is wearing a nasal mask.    Pt is sleeping

## 2025-03-18 NOTE — PLAN OF CARE
Problem: Chronic Conditions and Co-morbidities  Goal: Patient's chronic conditions and co-morbidity symptoms are monitored and maintained or improved  3/17/2025 2244 by Krunal Villasenor RN  Outcome: Progressing  3/17/2025 1057 by Bola Yeung RN  Outcome: Progressing  Flowsheets (Taken 3/17/2025 1057)  Care Plan - Patient's Chronic Conditions and Co-Morbidity Symptoms are Monitored and Maintained or Improved:   Monitor and assess patient's chronic conditions and comorbid symptoms for stability, deterioration, or improvement   Collaborate with multidisciplinary team to address chronic and comorbid conditions and prevent exacerbation or deterioration   Update acute care plan with appropriate goals if chronic or comorbid symptoms are exacerbated and prevent overall improvement and discharge     Problem: Discharge Planning  Goal: Discharge to home or other facility with appropriate resources  3/17/2025 2244 by Krunal Villasenor RN  Outcome: Progressing  3/17/2025 1057 by Bola Yeung RN  Outcome: Progressing  Flowsheets (Taken 3/17/2025 1057)  Discharge to home or other facility with appropriate resources:   Identify barriers to discharge with patient and caregiver   Identify discharge learning needs (meds, wound care, etc)   Refer to discharge planning if patient needs post-hospital services based on physician order or complex needs related to functional status, cognitive ability or social support system     Problem: Pain  Goal: Verbalizes/displays adequate comfort level or baseline comfort level  3/17/2025 2244 by Krunal Villasenor RN  Outcome: Progressing  3/17/2025 1057 by Bola Yeung RN  Outcome: Progressing  Flowsheets (Taken 3/17/2025 1057)  Verbalizes/displays adequate comfort level or baseline comfort level:   Encourage patient to monitor pain and request assistance   Consider cultural and social influences on pain and pain management     Problem: Safety - Adult  Goal: Free from fall injury  3/17/2025 2244 by

## 2025-03-18 NOTE — PROGRESS NOTES
V2.0  [facility] Hospitalist Progress Note      Name:  Hay Boyer /Age/Sex: 1950  (74 y.o. male)   MRN & CSN:  [id number] & [id number] Encounter Date/Time: 3/17/2025 9:11 PM EDT    Location:  [facility]/3119-A PCP: Rivas Beltran MD       Hospital Day: 3    Assessment and Plan:   Hay Boyer is a 74 y.o. male  who presents with Hematuria     Hospital Problems             Last Modified POA     * (Principal) Hematuria 3/15/2025 Yes      Assessment and Plan:  Gross hematuria plan for CBI.  Urology on board for three-way catheter placement.  No need for n.p.o. advance diet as tolerated telemetry in place admitted to PCU.  Monitor hemoglobin transfuse to keep hemoglobin above 7 prophylactic Rocephin no need for cultures  Leukocytosis likely reactive in the setting of above  History of atrial fibrillation on warfarin due to cost patient previously was on Xarelto.  Hold off on warfarin acute kidney failure last INR was 2.3.  Check INR every other day  Benign essential hypertension continue home medication including losartan hydrochlorothiazide and amlodipine  Chronic gout on colchicine at home which is on hold because of nephrotoxic effects  Obstructive sleep apnea continue with home CPAP  Anemia in the setting of multiple continue to monitor if further anemia develops consider iron studies for now Mentzer index is above 13 MCV is normal does not meet criteria for iron deficiency at the moment    Medical Decision Making:  The following items were considered in medical decision making:  Discussion of patient care with other providers  Reviewed clinical lab tests if any  Reviewed radiology tests if any  Reviewed other diagnostic tests/interventions  Independent review of radiologic images if any  Microbiology cultures and other micro tests if any    Estimated time spent for medical decision-making encompassing complexity of the case, history taking, medication review, physical examination, communication with  family, RN, , discussion with specialists, and ancillary staff members to provide accurate care for the patient was around 25 minutes.    MDM (any 2 required for High level billing)     A. Problems (any 1)  [x] Acute/Chronic Illness/injury posing ongoing threat to life and/or bodily function without ongoing treatment    [] Severe exacerbation of chronic illness    --------------------------------------------------  B. Risk of Treatment (any 1)    [] Drugs/treatments that require intensive monitoring for toxicity    [] IV ABX (Vancomycin, Aminoglycosides, etc)     [] Post-Cath/Contrast study requiring serial monitoring    [] IV Narcotic analgesia    [] Aggressive IV diuresis    [] Hypertonic Saline    [] Critical electrolyte abnormalities requiring IV replacement    [] Insulin - Scheduled/SSI or Insulin gtt    [] Anticoagulation (Heparin gtt or Coumadin - other anticoagulants in special circumstances)    [] Cardiac Medications (IV Amiodarone/Diltiazem, Tikosyn, etc)    [] Hemodialysis    [] Other -    [] Change in code status    [] Decision to escalate care    [] Major surgery/procedure with associated risk factors    --------------------------------------------------  C. Data (any 2)    [x] Data Review (any 3)    [x] Consultant notes from yesterday/today    [x] All available current labs reviewed interpreted for clinical significance    [x] Appropriate follow-up labs were ordered  [x] Collateral history obtained     [] Independent Interpretation of tests (any 1)    [] Telemetry (Rhythm Strip) personally reviewed and interpreted        [] Imaging personally reviewed and interpreted     [] Discussion (any 1)  [] Multi-Disciplinary Rounds with Case Management  [] Discussed management of the case with    The billing in this case is level 2 due to the above     Comment: Please note this report has been produced using speech recognition software and may contain errors related to that system including errors in

## 2025-03-18 NOTE — CARE COORDINATION
Chart reviewed. Pt seen by CM/MC yesterday for d/c planning.  Noted that pt is from home with wife and a referral was made to [facility] yesterday.  [facility] is agreeable to accept per note from Kami/CM.  Notify CM if any new d/c needs arise.  TE    .Upon discharge pt will be going home with [facility] home care  Please fax H/P, D/S.  AVS, order, and face sheet to [phone]  Please call [phone] and inform of discharge

## 2025-03-19 VITALS
WEIGHT: 307.4 LBS | RESPIRATION RATE: 16 BRPM | BODY MASS INDEX: 38.22 KG/M2 | OXYGEN SATURATION: 100 % | HEART RATE: 68 BPM | HEIGHT: 75 IN | TEMPERATURE: 98.4 F | DIASTOLIC BLOOD PRESSURE: 59 MMHG | SYSTOLIC BLOOD PRESSURE: 107 MMHG

## 2025-03-19 LAB
ANION GAP SERPL CALCULATED.3IONS-SCNC: 11 MMOL/L (ref 9–17)
BUN SERPL-MCNC: 18 MG/DL (ref 7–20)
CALCIUM SERPL-MCNC: 8.8 MG/DL (ref 8.3–10.6)
CHLORIDE SERPL-SCNC: 93 MMOL/L (ref 99–110)
CO2 SERPL-SCNC: 26 MMOL/L (ref 21–32)
CREAT SERPL-MCNC: 0.9 MG/DL (ref 0.8–1.3)
ERYTHROCYTE [DISTWIDTH] IN BLOOD BY AUTOMATED COUNT: 13.9 % (ref 11.7–14.9)
GFR, ESTIMATED: 87 ML/MIN/1.73M2
GLUCOSE SERPL-MCNC: 165 MG/DL (ref 74–99)
HCT VFR BLD AUTO: 36.6 % (ref 42–52)
HGB BLD-MCNC: 11.9 G/DL (ref 13.5–18)
MCH RBC QN AUTO: 30.1 PG (ref 27–31)
MCHC RBC AUTO-ENTMCNC: 32.5 G/DL (ref 32–36)
MCV RBC AUTO: 92.4 FL (ref 78–100)
PLATELET # BLD AUTO: 314 K/UL (ref 140–440)
PMV BLD AUTO: 12 FL (ref 7.5–11.1)
POTASSIUM SERPL-SCNC: 4.1 MMOL/L (ref 3.5–5.1)
RBC # BLD AUTO: 3.96 M/UL (ref 4.6–6.2)
SODIUM SERPL-SCNC: 130 MMOL/L (ref 136–145)
WBC OTHER # BLD: 13.1 K/UL (ref 4–10.5)

## 2025-03-19 PROCEDURE — 36415 COLL VENOUS BLD VENIPUNCTURE: CPT

## 2025-03-19 PROCEDURE — 6370000000 HC RX 637 (ALT 250 FOR IP): Performed by: STUDENT IN AN ORGANIZED HEALTH CARE EDUCATION/TRAINING PROGRAM

## 2025-03-19 PROCEDURE — 94761 N-INVAS EAR/PLS OXIMETRY MLT: CPT

## 2025-03-19 PROCEDURE — 80048 BASIC METABOLIC PNL TOTAL CA: CPT

## 2025-03-19 PROCEDURE — 6360000002 HC RX W HCPCS: Performed by: STUDENT IN AN ORGANIZED HEALTH CARE EDUCATION/TRAINING PROGRAM

## 2025-03-19 PROCEDURE — 85027 COMPLETE CBC AUTOMATED: CPT

## 2025-03-19 PROCEDURE — 2500000003 HC RX 250 WO HCPCS: Performed by: STUDENT IN AN ORGANIZED HEALTH CARE EDUCATION/TRAINING PROGRAM

## 2025-03-19 RX ORDER — WARFARIN SODIUM 2.5 MG/1
2.5 TABLET ORAL
Status: DISCONTINUED | OUTPATIENT
Start: 2025-03-19 | End: 2025-03-19 | Stop reason: HOSPADM

## 2025-03-19 RX ORDER — CEFDINIR 300 MG/1
300 CAPSULE ORAL 2 TIMES DAILY
Qty: 6 CAPSULE | Refills: 0 | Status: SHIPPED | OUTPATIENT
Start: 2025-03-19 | End: 2025-03-22

## 2025-03-19 RX ORDER — WARFARIN SODIUM 2.5 MG/1
2.5 TABLET ORAL DAILY
Status: DISCONTINUED | OUTPATIENT
Start: 2025-03-19 | End: 2025-03-19

## 2025-03-19 RX ADMIN — HYDROCHLOROTHIAZIDE 25 MG: 25 TABLET ORAL at 08:47

## 2025-03-19 RX ADMIN — WATER 1000 MG: 1 INJECTION INTRAMUSCULAR; INTRAVENOUS; SUBCUTANEOUS at 15:25

## 2025-03-19 RX ADMIN — SODIUM CHLORIDE, PRESERVATIVE FREE 10 ML: 5 INJECTION INTRAVENOUS at 08:47

## 2025-03-19 RX ADMIN — AMLODIPINE BESYLATE 5 MG: 5 TABLET ORAL at 08:47

## 2025-03-19 RX ADMIN — LOSARTAN POTASSIUM 100 MG: 100 TABLET, FILM COATED ORAL at 08:46

## 2025-03-19 RX ADMIN — METOPROLOL TARTRATE 25 MG: 25 TABLET, FILM COATED ORAL at 08:47

## 2025-03-19 ASSESSMENT — PAIN SCALES - GENERAL
PAINLEVEL_OUTOF10: 0
PAINLEVEL_OUTOF10: 0

## 2025-03-19 NOTE — CARE COORDINATION
[facility] Liaison spoke with pt and pt is agreeable to c at discharge. Demo info reviewed.  Please place inpatient consult to home health needs order in Epic at [place].

## 2025-03-19 NOTE — DISCHARGE SUMMARY
V2.0  Discharge Summary    Name:  Hay Boyer /Age/Sex: 1950 (74 y.o. male)   Admit Date: 3/15/2025  Discharge Date: 3/19/25    MRN & CSN:  [id number] & [id number] Encounter Date and Time 3/19/25 1:25 PM EDT    Attending:  Antonio Macias MD Discharging Provider: Antonio Macias MD       Hospital Course:     HPI:   Chief Complaint: Hematuria  Hay Boyer is a 74 y.o. male who presents with gross hematuria patient went to the PCP office and was told that patient has urine retention with concerns for hematuria development.  Patient was sent to the ER and ultimately catheter was placed with significant amount of urine output patient with gross hematuria while being at work.  Denies any nausea vomiting diarrhea constipation dizziness lightheadedness leg pain leg swelling.  Since emptying of the bladder patient symptoms are resolved but does feel a little weak.  Continue to monitor.  Urology at bedside will be placing three-way catheter.  To be admitted to the hospital with CBI plans    Problem Based Course:   ***    Comment: Please note this report has been produced using speech recognition software and may contain errors related to that system including errors in grammar, punctuation, and spelling, as well as words and phrases that may be inappropriate. If there are any questions or concerns please feel free to contact the dictating provider for clarification.      Consults this admission:  IP CONSULT TO UROLOGY  IP CONSULT TO UROLOGY  IP CONSULT TO HOME CARE NEEDS    Discharge Diagnosis:     Gross hematuria plan for CBI.  Urology on board for three-way catheter placement.  No need for n.p.o. advance diet as tolerated telemetry in place admitted to PCU.  Monitor hemoglobin transfuse to keep hemoglobin above 7 prophylactic Rocephin likely DC on didscharge. Discussed with urology, DC catheter do voiding trial, can resume warfarin tomorrow, follow up with urology  Hematuria resolved  Leukocytosis likely

## 2025-03-19 NOTE — PROGRESS NOTES
Outpatient Pharmacy Progress Note for Meds-to-Beds    Total number of Prescriptions Filled: 2    Additional Documentation:  Patient's family member picked-up the medication(s) in the OP Pharmacy      Thank you for letting us serve your patients.  [address] [address]    Phone: [phone]    Fax: [phone]

## 2025-03-19 NOTE — PROGRESS NOTES
V2.0  [facility] [place] Note      Name:  Hay Boyer /Age/Sex: 1950  (74 y.o. male)   MRN & CSN:  [id number] & [id number] Encounter Date/Time: 3/18/2025 9:58 PM EDT    Location:  [facility]/3119-A PCP: Rivas Beltran MD       Hospital Day: 5    Assessment and Plan:   Hay Boyer is a 74 y.o. male  who presents with Hematuria     Hospital Problems             Last Modified POA     * (Principal) Hematuria 3/15/2025 Yes      Assessment and Plan:  Gross hematuria plan for CBI.  Urology on board for three-way catheter placement.  No need for n.p.o. advance diet as tolerated telemetry in place admitted to PCU.  Monitor hemoglobin transfuse to keep hemoglobin above 7 prophylactic Rocephin likely DC on didscharge. Discussed with urology, DC catheter do voiding trial, can resume warfarin tomorrow, follow up with urology  Leukocytosis likely reactive in the setting of above  History of atrial fibrillation on warfarin due to cost patient previously was on Xarelto.  Hold off on warfarin tomorrow. Check INR every other day  Benign essential hypertension continue home medication including losartan hydrochlorothiazide and amlodipine  Chronic gout on colchicine at home which is on hold because of nephrotoxic effects  Obstructive sleep apnea continue with home CPAP  Anemia in the setting of multiple continue to monitor if further anemia develops consider iron studies for now Mentzer index is above 13 MCV is normal does not meet criteria for iron deficiency at the moment    Medical Decision Making:  The following items were considered in medical decision making:  Discussion of patient care with other providers  Reviewed clinical lab tests if any  Reviewed radiology tests if any  Reviewed other diagnostic tests/interventions  Independent review of radiologic images if any  Microbiology cultures and other micro tests if any    Estimated time spent for medical decision-making encompassing complexity of the

## 2025-03-19 NOTE — DISCHARGE INSTRUCTIONS
Medications: see computerized discharge medication list  Activity: activity as tolerated  Diet: regular diet   Disposition: home with home health  Discharged Condition: Stable

## 2025-03-19 NOTE — PROGRESS NOTES
V2.0  [facility] Hospitalist Progress Note      Name:  Hay Boyer /Age/Sex: 1950  (74 y.o. male)   MRN & CSN:  [id number] & [id number] Encounter Date/Time: 3/18/2025 9:58 PM EDT    Location:  [facility]9/3119-A PCP: Rivas Beltran MD       Hospital Day: 5    Assessment and Plan:   Hay Boyer is a 74 y.o. male  who presents with Hematuria     Hospital Problems             Last Modified POA     * (Principal) Hematuria 3/15/2025 Yes      Assessment and Plan:  Gross hematuria plan for CBI.  Urology on board for three-way catheter placement.  No need for n.p.o. advance diet as tolerated telemetry in place admitted to PCU.  Monitor hemoglobin transfuse to keep hemoglobin above 7 prophylactic Rocephin likely DC on didscharge. Discussed with urology, DC catheter do voiding trial, can resume warfarin tomorrow, follow up with urology  Leukocytosis likely reactive in the setting of above  History of atrial fibrillation on warfarin due to cost patient previously was on Xarelto.  Hold off on warfarin tomorrow. Check INR every other day  Benign essential hypertension continue home medication including losartan hydrochlorothiazide and amlodipine  Chronic gout on colchicine at home which is on hold because of nephrotoxic effects  Obstructive sleep apnea continue with home CPAP  Anemia in the setting of multiple continue to monitor if further anemia develops consider iron studies for now Mentzer index is above 13 MCV is normal does not meet criteria for iron deficiency at the moment    Medical Decision Making:  The following items were considered in medical decision making:  Discussion of patient care with other providers  Reviewed clinical lab tests if any  Reviewed radiology tests if any  Reviewed other diagnostic tests/interventions  Independent review of radiologic images if any  Microbiology cultures and other micro tests if any    Estimated time spent for medical decision-making encompassing complexity of the

## 2025-03-19 NOTE — PROGRESS NOTES
[facility] [address]   Progress Note  [facility] [address]      Date: 3/19/2025   Patient: Hay Boyer   : 1950   DOA: 3/15/2025   MRN: [id number]   ROOM#: 3119/[id number]     Admit Date: 3/15/2025     Collaborating Urologist on Call at time of admission: Dr. Toscano   CC: Dysuria, hematuria, clots, retention   Reason for Consult: Gross hematuria     Subjective:     Pain: none, no nausea, no vomiting  Bowel Movement/Flatus: Yes  Voiding: Easily    Pt resting in bedside recliner, reports feeling OK today and voiding well.    Objective:    Vitals:   BP 97/85   Pulse 91   Temp 98.7 °F (37.1 °C) (Oral)   Resp 17   Ht 1.905 m (6' 3\")   Wt (!) 139.4 kg (307 lb 6.4 oz)   SpO2 97%   BMI 38.42 kg/m²   Temp  Av.6 °F (37 °C)  Min: 98.5 °F (36.9 °C)  Max: 98.7 °F (37.1 °C)    Intake/Output Summary (Last 24 hours) at 3/19/2025 0826  Last data filed at 3/19/2025 0440  Gross per 24 hour   Intake 240 ml   Output 2150 ml   Net -1910 ml       Physical Exam:  General appearance: alert, appears stated age, cooperative, and no distress  Head: Normocephalic, without obvious abnormality, atraumatic  Back: No CVA tenderness  Lungs: Breathing unlabored  Abdomen: Soft, nontender, nondistended  : 22fr 3-way catheter in place, urine clear and CBI clamped  Extremities: No BLE edema  Neurologic: Grossly normal    Labs:  WBC:    Lab Results   Component Value Date/Time    WBC 16.4 2025 04:02 AM     Hemoglobin/Hematocrit:    Lab Results   Component Value Date/Time    HGB 11.1 2025 04:02 AM    HCT 34.4 2025 04:02 AM     BMP:   Lab Results   Component Value Date/Time     2025 04:02 AM    K 4.1 2025 04:02 AM     2025 04:02 AM    CO2 27 2025 04:02 AM    BUN 17 2025 04:02 AM    CREATININE 0.9 2025 04:02 AM    CALCIUM 8.5 2025 04:02 AM    GFRAA >60 2022 02:56 PM    LABGLOM 81 2025 04:02 AM    LABGLOM >60 2024 10:44 AM     Blood

## 2025-03-20 ENCOUNTER — CARE COORDINATION (OUTPATIENT)
Dept: CASE MANAGEMENT | Age: 75
End: 2025-03-20

## 2025-03-20 DIAGNOSIS — R31.0 GROSS HEMATURIA: Primary | ICD-10-CM

## 2025-03-20 PROCEDURE — 1111F DSCHRG MED/CURRENT MED MERGE: CPT | Performed by: FAMILY MEDICINE

## 2025-03-20 NOTE — CARE COORDINATION
in the Remote Patient Monitoring (RPM) program for in-home monitoring: Yes, but did not enroll at this time: controlled chronic disease management, already monitoring with home equipment, and wife is former rn who monitors vitals prn .    Assessments:  Care Transitions 24 Hour Call    Schedule Follow Up Appointment with PCP: Completed  Do you have a copy of your discharge instructions?: Yes  Do you have all of your prescriptions and are they filled?: Yes  Have you been contacted by a [facility] Pharmacist?: No  Have you scheduled your follow up appointment?: Yes  How are you going to get to your appointment?: Car - family or friend to transport  Do you have support at home?: Partner/Spouse/SO  Do you feel like you have everything you need to keep you well at home?: Yes  Are you an active caregiver in your home?: No  Care Transitions Interventions     Other Services: Declined (Comment: RPM)      Transportation Support: Declined    Meals on Wheels: Declined  DME Assistance: Declined     Senior Services: Declined              Follow Up Appointment:   Discussed follow up appointments. Patient has hospital follow up appointment scheduled within 7 days of discharge. 3/26/25 1040am Dr Toscano- Urology. Politely declined need for pcp appt.   Future Appointments         Provider Specialty Dept Phone    3/21/2025 10:45 AM (Arrive by 10:30 AM) SCHEDULE, [facility] NURSE Family Medicine [phone]    3/26/2025 12:15 PM Juan Manuel Meadows MD Cardiology [phone]    5/1/2025 8:15 AM Juan Manuel Meadows MD Cardiology [phone]    5/12/2025 8:00 AM (Arrive by 7:45 AM) Rivas Beltran MD Family Medicine [phone]            Care Transition Nurse provided contact information.  Plan for follow-up call in 2-5 days based on severity of symptoms and risk factors.  Plan for next call:  urinary c/o?, hematuria?, atb complete?, appt reminders      Faby Chandra RN

## 2025-03-25 ENCOUNTER — CARE COORDINATION (OUTPATIENT)
Dept: CASE MANAGEMENT | Age: 75
End: 2025-03-25

## 2025-03-25 NOTE — CARE COORDINATION
Care Transitions Note    Follow Up Call     Reason for Admission: Gross Hematuria     Patient Current Location:  Home: [address]  [facility] [address]    [facility] Care Coordinator contacted the patient by telephone. Verified name and  as identifiers.    Additional needs identified to be addressed with provider   No needs identified                 Method of communication with provider: none.    Care Summary Note: Spoke with Hay oByer who return my call but writer was on another line and returned his call. Patient stated that he was doing good except for still tired. Patient stated that he is still trying to build up his endurance. Patent stated that she laid in the hospital bed without getting up to walk caused him to be very weak and needing to use a walker for ambulation. Patient stated that he is still trying to build up his endurance. Patient denied cp, sob, cough, dizziness, headache, n/v, diarrhea, abdominal pains, fever, or chills. Patient report that appetite and fluid intake is good and denied any problems with bowel or bladder. Patient stated that he seen the urologist today and will be seeing the cardiologist tomorrow. Patient stated that he has not heard from the [facility] nurse and was hoping that she would be in tomorrow to check his INR. Writer did a 3 way call to [facility] and the admin stated that patient was on the schedule to be seen tomorrow and that a nurse will be giving him a call this evening to set an appointment time. Patient v/u.  Patient reported that he is taking all medications as ordered.  Patient denied any other needs at this time. Patient instructed to continue to monitor s/s, reporting any that may present to MD immediately for early intervention. Patient is agreeable to f/u calls.     Plan of care updates since last contact:          Advance Care Planning   The patient has the following advanced directives on file:  Advance Directives       Power of  Living Will

## 2025-03-25 NOTE — CARE COORDINATION
Care Transitions Note    Follow Up Call     Reason for Admission: Gross Hematuria     Attempted to reach patient for transitions of care follow up.  Unable to reach patient.      Outreach Attempts:   HIPAA compliant voicemail left for patient.     Follow Up Appointment:   Future Appointments         Provider Specialty Dept Phone    3/26/2025 12:15 PM Juan Manuel Meadows MD Cardiology [phone]    5/1/2025 8:15 AM Juan Manuel Meadows MD Cardiology [phone]    5/12/2025 8:00 AM (Arrive by 7:45 AM) Rivas Beltran MD Family Medicine [phone]            Plan for follow-up call in 2-5 days based on severity of symptoms and risk factors. Plan for next call: symptom management-.  self management-.    Miri Valle LPN

## 2025-03-26 ENCOUNTER — OFFICE VISIT (OUTPATIENT)
Dept: CARDIOLOGY CLINIC | Age: 75
End: 2025-03-26
Payer: MEDICARE

## 2025-03-26 ENCOUNTER — TELEPHONE (OUTPATIENT)
Dept: FAMILY MEDICINE CLINIC | Age: 75
End: 2025-03-26

## 2025-03-26 VITALS
WEIGHT: 315 LBS | DIASTOLIC BLOOD PRESSURE: 66 MMHG | HEIGHT: 75 IN | BODY MASS INDEX: 39.17 KG/M2 | HEART RATE: 64 BPM | SYSTOLIC BLOOD PRESSURE: 114 MMHG

## 2025-03-26 DIAGNOSIS — E11.9 TYPE 2 DIABETES MELLITUS WITHOUT COMPLICATION, WITHOUT LONG-TERM CURRENT USE OF INSULIN: ICD-10-CM

## 2025-03-26 DIAGNOSIS — G47.33 OBSTRUCTIVE SLEEP APNEA SYNDROME: ICD-10-CM

## 2025-03-26 DIAGNOSIS — I10 PRIMARY HYPERTENSION: Primary | ICD-10-CM

## 2025-03-26 DIAGNOSIS — E66.813 CLASS 3 SEVERE OBESITY DUE TO EXCESS CALORIES WITHOUT SERIOUS COMORBIDITY WITH BODY MASS INDEX (BMI) OF 40.0 TO 44.9 IN ADULT: ICD-10-CM

## 2025-03-26 DIAGNOSIS — E66.01 CLASS 3 SEVERE OBESITY DUE TO EXCESS CALORIES WITHOUT SERIOUS COMORBIDITY WITH BODY MASS INDEX (BMI) OF 40.0 TO 44.9 IN ADULT: ICD-10-CM

## 2025-03-26 DIAGNOSIS — I10 ESSENTIAL HYPERTENSION: ICD-10-CM

## 2025-03-26 DIAGNOSIS — E78.5 HYPERLIPIDEMIA, UNSPECIFIED HYPERLIPIDEMIA TYPE: ICD-10-CM

## 2025-03-26 DIAGNOSIS — I48.11 LONGSTANDING PERSISTENT ATRIAL FIBRILLATION (HCC): ICD-10-CM

## 2025-03-26 DIAGNOSIS — I87.2 PERIPHERAL VENOUS INSUFFICIENCY: ICD-10-CM

## 2025-03-26 DIAGNOSIS — I42.9 CARDIOMYOPATHY, UNSPECIFIED TYPE (HCC): ICD-10-CM

## 2025-03-26 PROCEDURE — 1159F MED LIST DOCD IN RCRD: CPT | Performed by: INTERNAL MEDICINE

## 2025-03-26 PROCEDURE — 1160F RVW MEDS BY RX/DR IN RCRD: CPT | Performed by: INTERNAL MEDICINE

## 2025-03-26 PROCEDURE — 3078F DIAST BP <80 MM HG: CPT | Performed by: INTERNAL MEDICINE

## 2025-03-26 PROCEDURE — 1123F ACP DISCUSS/DSCN MKR DOCD: CPT | Performed by: INTERNAL MEDICINE

## 2025-03-26 PROCEDURE — 3074F SYST BP LT 130 MM HG: CPT | Performed by: INTERNAL MEDICINE

## 2025-03-26 PROCEDURE — 99214 OFFICE O/P EST MOD 30 MIN: CPT | Performed by: INTERNAL MEDICINE

## 2025-03-26 RX ORDER — POTASSIUM CHLORIDE 1500 MG/1
TABLET, EXTENDED RELEASE ORAL DAILY
Qty: 90 TABLET | Refills: 0 | Status: SHIPPED | OUTPATIENT
Start: 2025-03-26

## 2025-03-26 RX ORDER — HYDROCHLOROTHIAZIDE 25 MG/1
25 TABLET ORAL DAILY
Qty: 90 TABLET | Refills: 0 | Status: SHIPPED | OUTPATIENT
Start: 2025-03-26

## 2025-03-26 RX ORDER — LOSARTAN POTASSIUM 100 MG/1
100 TABLET ORAL DAILY
Qty: 90 TABLET | Refills: 0 | Status: SHIPPED | OUTPATIENT
Start: 2025-03-26

## 2025-03-26 RX ORDER — AMLODIPINE BESYLATE 5 MG/1
5 TABLET ORAL DAILY
Qty: 90 TABLET | Refills: 0 | Status: SHIPPED | OUTPATIENT
Start: 2025-03-26

## 2025-03-26 NOTE — TELEPHONE ENCOUNTER
To Dr. Schumacher-    Home Care Reporting      INR  1.8  (from today)      2.5 mg every day except for Monday----Monday, he does not take any coumadin.    Please call Sebastian when INR should be

## 2025-03-26 NOTE — PATIENT INSTRUCTIONS
4/11/2023   Limited study due to patients body habitus.   Left ventricular function and size is normal, EF is estimated at 55-60%.   Moderate left ventricular hypertrophy.   Diastolic dysfunction could not be evaluated due to arrhythmia.   Severely dilated left atrium.   Right side of the heart is mildly enlarged.   Sclerotic, but non-stenotic aortic valve.   Mild to Moderate mitral, moderate aortic and mild tricuspid regurgitation is   present.   RVSP is 32 mmHg.   Dilation of the aortic root(4.1cm) and the ascending aorta(3.8cm).   No evidence of pericardial effusion.                CAROTID ARTERY DISEASE:no  3/23/2023   No evidence of hemodynamically significant stenosis in the bilateral carotid    and vertebral arteries.      ARRHYTHMIAS:yes,   Patient has H/O Atrial fibrillation  He is rate controlled & on anticoagulation. Takes Warfarin.  Patient has been recently hospitalized for genitourinary problems.  Prostate, kidney stone and bladder problems with hematuria.  Patient blood pressure twice in his urinary tract.  Therefore I am going to refer patient to Dr. Del Rio for consideration of Watchman device.     CHRONIC VENOUS INSUFFICIENCY:yes, being managed with the help of compression stockings.                SEDRICK: On C-pap.    TESTS ORDERED:none this visit.     PREVIOUSLY ORDERED TESTS REVIEWED & DISCUSSED WITH THE PATIENT:     I personally reviewed & interpreted, all previously ordered tests as copied above. Latest Labs are pulled in to the note with dates.   Labs, specially in Reference to Lipid profile, Cardiac testing in the form of Echo ( dated: ), stress tests ( dated: ) & other relevant cardiac testing reviewed with patient & recommendations made based on assessment of the results.    Discussed role of Cardiac risk factors & effects + treatment of co morbidities with patient & advised accordingly.     MEDICATIONS: List of medications patient is currently taking is reviewed in detail with the patient.

## 2025-03-26 NOTE — PROGRESS NOTES
OFFICE PROGRESS NOTES      Hay is a 74 y.o. male who has    CHIEF COMPLAINT AS FOLLOWS:  CHEST PAIN: Patient denies any C/O chest pains at this time.      SOB: No C/O SOB at this time.                LEG EDEMA: No leg edema   PALPITATIONS: Denies any C/O Palpitations   DIZZINESS: No C/O Dizziness   SYNCOPE: None   OTHER/ ADDITIONAL COMPLAINTS:                                     HPI: Patient is here for F/U on his VHD, Arrhythmia, HTN & Dyslipidemia problems.     VHD: Patient has known mitral / aortic valve disease.   Arrhythmia: Patient has known  H/O PAF.  HTN: Patient has known essential HTN. Has been treated with guideline recommended medical / physical/ diet therapy as stated below.  Dyslipidemia: Patient has known mixed dyslipidemia. Has been treated with guideline recommended medical / physical/ diet therapy as stated below.                Current Outpatient Medications   Medication Sig Dispense Refill    amLODIPine (NORVASC) 5 MG tablet Take 1 tablet by mouth once daily 90 tablet 0    hydroCHLOROthiazide (HYDRODIURIL) 25 MG tablet Take 1 tablet by mouth once daily 90 tablet 0    losartan (COZAAR) 100 MG tablet Take 1 tablet by mouth once daily 90 tablet 0    metoprolol tartrate (LOPRESSOR) 25 MG tablet Take 1 tablet by mouth 2 times daily 60 tablet 3    warfarin (COUMADIN) 2.5 MG tablet TAKE ONE TABLET BY MOUTH ONCE DAILY IN THE EVENING EXCEPT ON MONDAYS, NO THERAPY 90 tablet 1    azelastine (OPTIVAR) 0.05 % ophthalmic solution Place 1 drop into both eyes 2 times daily 1 each 5    Aspirin Buf,CaCarb-MgCarb-MgO, 81 MG TABS Take 1 tablet by mouth daily      potassium chloride (KLOR-CON M) 20 MEQ extended release tablet Take 1 tablet by mouth once daily (Patient not taking: Reported on 3/26/2025) 90 tablet 0     No current facility-administered medications for this visit.     Allergies: Patient has no known allergies.  Review of Systems:    Constitutional: Negative for diaphoresis and

## 2025-03-26 NOTE — PROGRESS NOTES
CLINICAL STAFF DOCUMENTATION    Dr. Juan Manuel Boyer  1950  [id number]    Have you had any Chest Pain recently? - No      Have you had any Shortness of Breath -  Pt states after hospital visit he dealt with sob and swelling in ankles but finally he is back to normal.  Have you had any dizziness - No    Have you had any palpitations recently? - No    Do you have any edema - swelling in  ankles s/p hospital but has gone down since, wears compression socks daily       When did you have your last labs drawn 3/19/2025  Do we have the labs in their chart Yes      If we do not have these labs, you are retrieve these labs for the provider!    Do you have a surgery or procedure scheduled in the near future - No    Check medication list thoroughly!!! AND RECONCILE OUTSIDE MEDICATIONS  If dose has changed change the entire order not just the MG  BE SURE TO ASK PATIENT IF THEY NEED MEDICATION REFILLS  Verify Pharmacy and update if incorrect    Add to every patient's \"wrap up\" the following dot phrase AFTERVISITCARDIOHEARTHOUSE and ensure we explain this to our patients

## 2025-03-26 NOTE — PROGRESS NOTES
Physician Progress Note      PATIENT:               FAB NASSAR  CSN #:                  [id number]  :                       1950  ADMIT DATE:       3/15/2025 8:58 AM  DISCH DATE:        3/19/2025 1:24 PM  RESPONDING  PROVIDER #:        JESUS MANUEL TURCIOS PA-C          QUERY TEXT:    Pt admitted with UTI.  Pt noted to have Aquablation with cystolitholapaxy on   2/10/2025 and a soler catheter placed in the Urology office on 3/11/2025. If   possible, please document in the progress notes and discharge summary if you   are evaluating and/or treating any of the following:    The medical record reflects the following:  Risk Factors: recent procedure and soler cath, UTI    Clinical Indicators: 3/19 Urology PN- \"Gross hematuria with clot retention:   S/p Aquablation with cystolitholapaxy and diagnostic right ureteroscopy   2/10/2025 for treatment of BPH with LUTS.  Had episode of gross hematuria   recently and had Soler placed in our office early 2wks ago that was removed   for a TOV last week. Complicated UTI: Urine cx 3/10/25 +Klebsiella pneumoniae   >100K CFU/ml. UA could not be run due to hematuria - recommend urine culture.\"    Treatment: 3 way cath with CBI, IV Rocephin, DC on Omnicef for 3 days  Options provided:  -- UTI due to previous urinary catheter on 3/11/2025  -- UTI is related to the Aquablation on 2/10/2025  -- Other - I will add my own diagnosis  -- Disagree - Not applicable / Not valid  -- Disagree - Clinically unable to determine / Unknown  -- Refer to Clinical Documentation Reviewer    PROVIDER RESPONSE TEXT:    UTI due to previous urinary catheter on 3/11/2025    Query created by: Alison Alvarez on 3/26/2025 9:02 AM      Electronically signed by:  JESUS MANUEL TURCIOS PA-C 3/26/2025 9:22 AM

## 2025-03-27 ENCOUNTER — TELEPHONE (OUTPATIENT)
Dept: CARDIOLOGY CLINIC | Age: 75
End: 2025-03-27

## 2025-03-27 NOTE — TELEPHONE ENCOUNTER
Spoke with patient and scheduled consult with Dr. Hillman for 4/18/25 @ 1100am.    Received referral from Dr. Meadows for possible Watchman discussion.    Patient advised understanding.

## 2025-04-01 ENCOUNTER — CARE COORDINATION (OUTPATIENT)
Dept: CASE MANAGEMENT | Age: 75
End: 2025-04-01

## 2025-04-01 NOTE — PROGRESS NOTES
Physician Progress Note      PATIENT:               FAB NASSAR  CSN #:                  [id number]  :                       1950  ADMIT DATE:       3/15/2025 8:58 AM  DISCH DATE:        3/19/2025 1:24 PM  RESPONDING  PROVIDER #:        Antonio Macias MD          QUERY TEXT:    Pt admitted with UTI and gross hematuria. Noted documentation of UTI due to   previous urinary catheter on 3/11/2025  on 3/26/25 query response from Urology. If possible, please document in   progress notes and discharge summary:    The medical record reflects the following:  Risk Factors: UTI, recent procedure and solre cath, hematuria    Clinical Indicators: 3/26/25 Urology query response- \"UTI due to previous   urinary catheter on 3/11/2025\"  3/19 Urology PN- \"Gross hematuria with clot retention:  S/p Aquablation with cystolitholapaxy and diagnostic right ureteroscopy  2/10/2025 for treatment of BPH with LUTS.  Had episode of gross hematuria  recently and had Soler placed in our office early 2wks ago that was removed  for a TOV last week. Complicated UTI: Urine cx 3/10/25 +Klebsiella pneumoniae  >100K CFU/ml. UA could not be run due to hematuria - recommend urine culture.\"    Treatment: 3 way cath with CBI, IV Rocephin, DC on Omnicef for 3 days  Options provided:  -- UTI due to previous urinary catheter on 3/11/2025 confirmed present on   admission  -- Defer to Urology consultant documentation regarding UTI due to previous   urinary catheter on 3/11/2025  -- Other - I will add my own diagnosis  -- Disagree - Not applicable / Not valid  -- Disagree - Clinically unable to determine / Unknown  -- Refer to Clinical Documentation Reviewer    PROVIDER RESPONSE TEXT:    Defer to Urology consultant documentation regarding UTI due to previous   urinary catheter on 3/11/2025    Query created by: Alison Alvarez on 3/27/2025 7:15 AM      Electronically signed by:  Antonio Macias MD 3/31/2025 9:05 PM

## 2025-04-01 NOTE — CARE COORDINATION
Care Transitions Note    Follow Up Call     Patient: Hay Boyer                                 Patient : 1950   MRN: [id number]                             Reason for Admission: Gross Hematuria  Discharge Date: 3/19/25       RURS: Readmission Risk Score: 11.8  Facility: [facility] 3/15/25-3/19/25    Patient Current Location:  Home: [facility] [id number]    Care Transition Nurse contacted Elizabeth, wife,   by telephone. Verified patient name and  as identifiers.    Additional needs identified to be addressed with provider   No needs identified       Method of communication with provider: none.    Care Summary Note: Elizabeth provides the following patient information as he is not home. Reports patient \"doing good\". Denies noted/patient reported hematuria, urinary complaints, pain. Reports patient completed Omnicef as directed. Reports patient was seen by Dr Toscano 3/26/25 as scheduled. Reports voiding qs--no issues. Reports eating well, encourages patient to drink more. Reports + bm, wnl. Denies brbpr, black/tarry stools. Reports patient taking all rx as directed, denies refill needs. PCP manages Coumadin--no questions as wife is nurse. Reports patient tires easily however energy/endurance getting better, no longer requiring walker. Has appt 25 w/ Dr Hillman to discuss possible Watchman. [facility]. Reminded to contact [facility], PCP, Urology  re: health concerns for early outpt intervention. Denies resource needs.     Plan of care updates since last contact:  As above     Advance Care Planning:   Does patient have an Advance Directive: reviewed and current--discussed on prior outreach.     Medication Review:  No changes since last call.     Remote Patient Monitoring:  Declined on previous outreach as controlled chronic disease management, already monitoring with home equipment, and wife is former rn who monitors vitals prn     Assessments:  Care

## 2025-04-03 ENCOUNTER — TELEPHONE (OUTPATIENT)
Dept: FAMILY MEDICINE CLINIC | Age: 75
End: 2025-04-03

## 2025-04-03 NOTE — TELEPHONE ENCOUNTER
To Dr. Schumacher---    Home Care Reporting:    INR  3.5    Coumadin 2.5 qd     Please call Sebastian when you want him to draw INR again.

## 2025-04-03 NOTE — TELEPHONE ENCOUNTER
Per Dr. Gonzalez's verbal. Patient to skip today and Thursdays. Recheck in 2 weeks. Spoke with Sebastian. He voiced understanding and will let patient know.

## 2025-04-11 ENCOUNTER — CARE COORDINATION (OUTPATIENT)
Dept: CASE MANAGEMENT | Age: 75
End: 2025-04-11

## 2025-04-11 NOTE — CARE COORDINATION
Home Care Waiver: Completed  Other Services: Declined (Comment: RPM)      Transportation Support: Declined    Meals on Wheels: Declined  DME Assistance: Declined     Senior Services: Declined    Other Interventions:              Follow Up Appointment:   Reviewed upcoming appointment(s).  Future Appointments         Provider Specialty Dept Phone    4/18/2025 11:00 AM Carlos Hillman MD Cardiac Electrophysiology [phone]    5/12/2025 8:00 AM (Arrive by 7:45 AM) Rivas Beltran MD Family Medicine [phone]    9/25/2025 11:00 AM Juan Manuel Meadows MD Cardiology [phone]            Care Transition Nurse provided contact information.  Plan for follow-up call in 6-10 days based on severity of symptoms and risk factors.  Plan for next call:  final call if well, no needs      Faby Chandra RN

## 2025-04-16 ENCOUNTER — CARE COORDINATION (OUTPATIENT)
Dept: CASE MANAGEMENT | Age: 75
End: 2025-04-16

## 2025-04-16 NOTE — CARE COORDINATION
Declined     Senior Services: Declined    Other Interventions:              Upcoming Appointments:    Future Appointments         Provider Specialty Dept Phone    4/18/2025 11:00 AM Carlos Hillman MD Cardiac Electrophysiology [phone]    5/12/2025 8:00 AM (Arrive by 7:45 AM) Rivas Beltran MD Family Medicine [phone]    9/25/2025 11:00 AM Juan Manuel Meadows MD Cardiology [phone]            Patient has agreed to contact primary care provider and/or specialist for any further questions, concerns, or needs.    Faby Chandra RN

## 2025-04-18 ENCOUNTER — INITIAL CONSULT (OUTPATIENT)
Age: 75
End: 2025-04-18
Payer: MEDICARE

## 2025-04-18 VITALS
HEART RATE: 65 BPM | DIASTOLIC BLOOD PRESSURE: 74 MMHG | WEIGHT: 315 LBS | BODY MASS INDEX: 39.17 KG/M2 | SYSTOLIC BLOOD PRESSURE: 122 MMHG | HEIGHT: 75 IN

## 2025-04-18 DIAGNOSIS — I48.11 LONGSTANDING PERSISTENT ATRIAL FIBRILLATION (HCC): Primary | ICD-10-CM

## 2025-04-18 DIAGNOSIS — I48.91 ATRIAL FIBRILLATION, UNSPECIFIED TYPE (HCC): ICD-10-CM

## 2025-04-18 PROCEDURE — 3078F DIAST BP <80 MM HG: CPT | Performed by: INTERNAL MEDICINE

## 2025-04-18 PROCEDURE — 3074F SYST BP LT 130 MM HG: CPT | Performed by: INTERNAL MEDICINE

## 2025-04-18 PROCEDURE — 1123F ACP DISCUSS/DSCN MKR DOCD: CPT | Performed by: INTERNAL MEDICINE

## 2025-04-18 PROCEDURE — 93000 ELECTROCARDIOGRAM COMPLETE: CPT | Performed by: INTERNAL MEDICINE

## 2025-04-18 PROCEDURE — 99204 OFFICE O/P NEW MOD 45 MIN: CPT | Performed by: INTERNAL MEDICINE

## 2025-04-18 NOTE — PROGRESS NOTES
Saw patient for Pre Watchman Education.   My role as Watchman Coordinator explained.   Watchman discussed, Brochure provided and [employer] video shared.   Nice Tool utilized and filled out.   Will fax SD/NICE tool to DR Beltran for signing.    Explained to the patient:  Part of the FDA approval of the Watchman procedure in 2015 mandated that each procedure must participate in a national registry, this requires several follow up appointments and testing following the procedure.      These expectations Include:      7-10 day follow up with the Nurse practitioner or Implanting Physician    45 day follow up lab work and NIA to check positioning of the device.      6 month follow up telephone call     1 year follow up appointment and lab work (NIA per Implanting physician discretion)    2  year follow up appointment and lab work .    Discussed the importance of blood thinners as prescribed and that the patient cannot come off those blood thinners until discontinued by the implanting physician.     Assessment/History of:    Nickel or metal allergy?  [] Yes     [x] No    Contrast allergy?  [] Yes     [x] No    Anesthesia issues?  [] Yes     [x] No    Difficulty swallowing?  [] Yes     [x] No    Do you have any procedures/surgeries planned that would interrupt Plavix and ASA for next 6 months?  [] Yes     [x] No    History of Sleep Apnea?  [x] Yes     [] No    Do you wear a CPAP?  [x] Yes     [] No      Modified Tomas Scale:    [x] 0: No symptoms at all  [] 1: No significant disability despite symptoms  [] 2: Slight disability   [] 3: Moderate disability  [] 4: Moderately severe disability  [] 5: Severe disability    [] Modified Maries Not Administered      All questions and concerns answered.  Will schedule Watchman for 5/8/2025 at 0700  NIA week prior.  Will follow    Kristina Bazzi RN  Watchman CoordinatorElectronically signed by Kristina Bazzi RN on 4/18/2025 at 11:49 AM Watchman Care Coordinator   
(320 lb 3.2 oz)   Height: 1.905 m (6' 3\")        IMPRESSION / RECOMMENDATIONS:       Assessment & Plan    Long standing persistent atrial fibrillation (non valvular) sp previous ablation x 2 at [facility] -patient on Coumadin, metoprolol  DM-2 mentioned in history but appears to be not on any medications at this time -diet controlled  Obesity BMI 40  HTN on losartan, hydrochlorothiazide amlodipine blood pressure is controlled  HLD not on any statins at this time diet control  SEDRICK  Osteoarthritis  Hypercoagulable secondary to atrial fibrillation   9.   Hematuria - multiple episodes needing interruption of coumadin  10. CAD non obstructive based on Cardiac CT at [facility]    Patient is in longstanding persistent atrial fibrillation with previous 2 ablations at [facility].  Patient has not been symptomatic from atrial fibrillation but he has been having hematuria multiple episodes of despite evaluation on Coumadin and he is needing to interrupt the Coumadin and is worried about stroke risk and bleeding risk and wants to consider about watchman.    Atrial Fibrillation CHADSVASC2 Score Stroke Risk:   74 y.o. 65-74 - 1   male Male - 0   CHF HX: No - 0   HTN HX: Yes - 1   Stroke/TIA/Thromboembolism No - 0   Vascular Disease HX: Yes - 1   Diabetes Mellitus Yes - 1   CHADSVASC 2 Score 4      Annual Stroke Risk 4.8%- moderate-high           HAS-BLED Score                            Risk Factors      Points   Hypertension  Uncontrolled, >160 mmHg systolic   No=0   Renal disease  Dialysis, transplant, Cr>2.26 mg/dL or >200 µmol/L   No=0   Liver disease   Cirrhosis or bilirubin >2x normal with AST/ALT/AP >3x normal   No=0   Stroke history   No=0   Prior major bleeding or predisposition to bleeding     Yes=1   Labile  INR  Unstable/high INRs, time in therapeutic range <60%   Yes=1   Age >65   Yes=1   Medication usage predisposing to bleeding   Aspirin, clopidogrel, NSAIDs   Yes=1   Alcohol use   >7 drinks/week   No=0       HAS-BLED Score:    4:

## 2025-04-22 ENCOUNTER — TELEPHONE (OUTPATIENT)
Dept: FAMILY MEDICINE CLINIC | Age: 75
End: 2025-04-22

## 2025-04-22 ENCOUNTER — TELEPHONE (OUTPATIENT)
Dept: CARDIOLOGY CLINIC | Age: 75
End: 2025-04-22

## 2025-04-22 DIAGNOSIS — I48.11 LONGSTANDING PERSISTENT ATRIAL FIBRILLATION (HCC): Primary | ICD-10-CM

## 2025-04-22 DIAGNOSIS — I48.19 PERSISTENT ATRIAL FIBRILLATION (HCC): Primary | ICD-10-CM

## 2025-04-22 DIAGNOSIS — I48.19 ATRIAL FIBRILLATION, PERSISTENT (HCC): ICD-10-CM

## 2025-04-22 NOTE — TELEPHONE ENCOUNTER
Test Ordered:  watchman    /  Insurance: [employer]  /  [place]  /  Authorization Status: Pending

## 2025-04-22 NOTE — TELEPHONE ENCOUNTER
LM for Patient requesting call back to inform of Procedure dates/times with Dr. Hillman.     PPW: 4/28/25 @ 130  NIA: 5/1/25 @ 0800  Watchman: 5/8/25 @ 0700    Will try again later to contact patient.   Pt would like to know if he is able to have lab work done to check HIV,chlamydia and etc. Please advise

## 2025-04-23 NOTE — TELEPHONE ENCOUNTER
Spoke with Patient and informed him of procedure dates/times with Dr. Hillman.    PPW: 4/28/25@130  NIA: 5/1/25@ 0800  Watchman: 5/8/25@ 0700    Patient advised understanding.

## 2025-04-27 ENCOUNTER — HOSPITAL ENCOUNTER (EMERGENCY)
Age: 75
Discharge: HOME | End: 2025-04-27
Attending: STUDENT IN AN ORGANIZED HEALTH CARE EDUCATION/TRAINING PROGRAM
Payer: MEDICARE

## 2025-04-27 ENCOUNTER — APPOINTMENT (OUTPATIENT)
Dept: GENERAL RADIOLOGY | Age: 75
End: 2025-04-27
Attending: STUDENT IN AN ORGANIZED HEALTH CARE EDUCATION/TRAINING PROGRAM
Payer: MEDICARE

## 2025-04-27 VITALS
WEIGHT: 315 LBS | HEIGHT: 75 IN | RESPIRATION RATE: 18 BRPM | TEMPERATURE: 97.1 F | HEART RATE: 61 BPM | SYSTOLIC BLOOD PRESSURE: 127 MMHG | DIASTOLIC BLOOD PRESSURE: 75 MMHG | BODY MASS INDEX: 39.17 KG/M2 | OXYGEN SATURATION: 100 %

## 2025-04-27 DIAGNOSIS — R79.89 ELEVATED TROPONIN: ICD-10-CM

## 2025-04-27 DIAGNOSIS — R42 DIZZINESS: ICD-10-CM

## 2025-04-27 DIAGNOSIS — R07.9 CHEST PAIN, UNSPECIFIED TYPE: Primary | ICD-10-CM

## 2025-04-27 DIAGNOSIS — I48.11 LONGSTANDING PERSISTENT ATRIAL FIBRILLATION (HCC): ICD-10-CM

## 2025-04-27 LAB
ANION GAP SERPL CALCULATED.3IONS-SCNC: 13 MMOL/L (ref 9–17)
BASOPHILS # BLD: 0.05 K/UL
BASOPHILS NFR BLD: 1 % (ref 0–1)
BUN SERPL-MCNC: 22 MG/DL (ref 7–20)
CALCIUM SERPL-MCNC: 9.2 MG/DL (ref 8.3–10.6)
CHLORIDE SERPL-SCNC: 101 MMOL/L (ref 99–110)
CO2 SERPL-SCNC: 25 MMOL/L (ref 21–32)
CREAT SERPL-MCNC: 0.8 MG/DL (ref 0.8–1.3)
EOSINOPHIL # BLD: 0.29 K/UL
EOSINOPHILS RELATIVE PERCENT: 3 % (ref 0–3)
ERYTHROCYTE [DISTWIDTH] IN BLOOD BY AUTOMATED COUNT: 14.5 % (ref 11.7–14.9)
GFR, ESTIMATED: >90 ML/MIN/1.73M2
GLUCOSE SERPL-MCNC: 160 MG/DL (ref 74–99)
HCT VFR BLD AUTO: 39.4 % (ref 42–52)
HGB BLD-MCNC: 12.9 G/DL (ref 13.5–18)
IMM GRANULOCYTES # BLD AUTO: 0.05 K/UL
IMM GRANULOCYTES NFR BLD: 1 %
INR PPP: 2.2
LYMPHOCYTES NFR BLD: 3.65 K/UL
LYMPHOCYTES RELATIVE PERCENT: 40 % (ref 24–44)
MCH RBC QN AUTO: 29.9 PG (ref 27–31)
MCHC RBC AUTO-ENTMCNC: 32.7 G/DL (ref 32–36)
MCV RBC AUTO: 91.4 FL (ref 78–100)
MONOCYTES NFR BLD: 0.84 K/UL
MONOCYTES NFR BLD: 9 % (ref 0–5)
NEUTROPHILS NFR BLD: 47 % (ref 36–66)
NEUTS SEG NFR BLD: 4.26 K/UL
PLATELET # BLD AUTO: 292 K/UL (ref 140–440)
PMV BLD AUTO: 9.3 FL (ref 7.5–11.1)
POTASSIUM SERPL-SCNC: 3.9 MMOL/L (ref 3.5–5.1)
PROTHROMBIN TIME: 24.9 SEC (ref 11.7–14.5)
RBC # BLD AUTO: 4.31 M/UL (ref 4.6–6.2)
SODIUM SERPL-SCNC: 139 MMOL/L (ref 136–145)
TROPONIN I SERPL HS-MCNC: 25 NG/L (ref 0–22)
TROPONIN I SERPL HS-MCNC: 31 NG/L (ref 0–22)
WBC OTHER # BLD: 9.1 K/UL (ref 4–10.5)

## 2025-04-27 PROCEDURE — 99285 EMERGENCY DEPT VISIT HI MDM: CPT

## 2025-04-27 PROCEDURE — 84484 ASSAY OF TROPONIN QUANT: CPT

## 2025-04-27 PROCEDURE — 96360 HYDRATION IV INFUSION INIT: CPT

## 2025-04-27 PROCEDURE — 71046 X-RAY EXAM CHEST 2 VIEWS: CPT

## 2025-04-27 PROCEDURE — 93005 ELECTROCARDIOGRAM TRACING: CPT | Performed by: STUDENT IN AN ORGANIZED HEALTH CARE EDUCATION/TRAINING PROGRAM

## 2025-04-27 PROCEDURE — 85610 PROTHROMBIN TIME: CPT

## 2025-04-27 PROCEDURE — 2580000003 HC RX 258: Performed by: STUDENT IN AN ORGANIZED HEALTH CARE EDUCATION/TRAINING PROGRAM

## 2025-04-27 PROCEDURE — 85025 COMPLETE CBC W/AUTO DIFF WBC: CPT

## 2025-04-27 PROCEDURE — 36415 COLL VENOUS BLD VENIPUNCTURE: CPT

## 2025-04-27 PROCEDURE — 80048 BASIC METABOLIC PNL TOTAL CA: CPT

## 2025-04-27 RX ORDER — 0.9 % SODIUM CHLORIDE 0.9 %
500 INTRAVENOUS SOLUTION INTRAVENOUS ONCE
Status: COMPLETED | OUTPATIENT
Start: 2025-04-27 | End: 2025-04-27

## 2025-04-27 RX ADMIN — SODIUM CHLORIDE 500 ML: 0.9 INJECTION, SOLUTION INTRAVENOUS at 02:26

## 2025-04-27 ASSESSMENT — PAIN - FUNCTIONAL ASSESSMENT
PAIN_FUNCTIONAL_ASSESSMENT: 0-10
PAIN_FUNCTIONAL_ASSESSMENT: 0-10

## 2025-04-27 ASSESSMENT — PAIN SCALES - GENERAL
PAINLEVEL_OUTOF10: 0
PAINLEVEL_OUTOF10: 0

## 2025-04-27 ASSESSMENT — LIFESTYLE VARIABLES
HOW MANY STANDARD DRINKS CONTAINING ALCOHOL DO YOU HAVE ON A TYPICAL DAY: PATIENT DOES NOT DRINK
HOW OFTEN DO YOU HAVE A DRINK CONTAINING ALCOHOL: NEVER

## 2025-04-27 NOTE — ED PROVIDER NOTES
Emergency Department Encounter    Patient: Hay Boyer  MRN: [id number]  : 1950  Date of Evaluation: 2025  ED Provider:  Satya Salazar DO    Triage Chief Complaint:   Chest Pain (Flutter in his chest)    [place]:  Hay Boyer is a 74 y.o. male that presents with complaints of a fluttering sensation in his chest.  Reports it came on suddenly when he laid down at 11 PM tonight.  Nonexertional.  It is resolved now.  He also describes a dizziness sensation where he describes it as he was falling.  No syncopal episode.  This has resolved now as well.  He has not had any recent illness.  He does have a history of atrial fibrillation and is getting a Watchman procedure next week.  Denies any shortness of breath.  No cough or any other recent illness no nausea or vomiting.    MDM:    History from : Patient    On arrival patient well-appearing no acute distress.  He has reassuring vital signs.  He is currently asymptomatic.  NIH is 0 no skew deviation.  My suspicion for central cause of vertigo is quite low.  He is no longer dizzy either.  His EKG showed atrial fibrillation without any signs of acute ischemia.  His troponin was elevated compared to his baseline although the number is still low.  Repeat troponin essentially stable without any increase.  I recommended the patient that he have admission given his elevated troponin and due to the sensation he was having.  He does have high risk factors for developing coronary artery disease.  However, he had a left heart cath and February, just 2 months ago and at that time he did not have any atherosclerotic disease noted.  He reported that because of this he feels much better if he is discharged and follows up with his cardiologist next week instead.  I did advise him that given the elevated troponin I do not have a definitive exclusion of coronary artery disease and that he could have alternative outcome such as an MI.  He understands these risks and with

## 2025-04-27 NOTE — ED TRIAGE NOTES
Patient presents to the ED via EMS from home with complaints of chest pain/flutter. Patient states it started when he laid down around 11pm. Rates pain 0/10 at this time. Has cardiac hx. States he has a watchman procedure scheduled.

## 2025-04-28 ENCOUNTER — HOSPITAL ENCOUNTER (OUTPATIENT)
Age: 75
Discharge: HOME | End: 2025-04-28
Payer: MEDICARE

## 2025-04-28 ENCOUNTER — HOSPITAL ENCOUNTER (OUTPATIENT)
Dept: GENERAL RADIOLOGY | Age: 75
Discharge: HOME | End: 2025-04-28
Payer: MEDICARE

## 2025-04-28 ENCOUNTER — TRANSCRIBE ORDERS (OUTPATIENT)
Dept: GENERAL RADIOLOGY | Age: 75
End: 2025-04-28

## 2025-04-28 ENCOUNTER — CLINICAL SUPPORT (OUTPATIENT)
Age: 75
End: 2025-04-28

## 2025-04-28 DIAGNOSIS — I48.11 LONGSTANDING PERSISTENT ATRIAL FIBRILLATION (HCC): Primary | ICD-10-CM

## 2025-04-28 DIAGNOSIS — I48.11 LONGSTANDING PERSISTENT ATRIAL FIBRILLATION (HCC): ICD-10-CM

## 2025-04-28 DIAGNOSIS — I48.91 ATRIAL FIBRILLATION, UNSPECIFIED TYPE (HCC): Primary | ICD-10-CM

## 2025-04-28 LAB
ABO + RH BLD: NORMAL
ANION GAP SERPL CALCULATED.3IONS-SCNC: 13 MMOL/L (ref 9–17)
BLOOD BANK COMMENT: NORMAL
BLOOD BANK SAMPLE EXPIRATION: NORMAL
BLOOD GROUP ANTIBODIES SERPL: NEGATIVE
BUN SERPL-MCNC: 21 MG/DL (ref 7–20)
CALCIUM SERPL-MCNC: 9.5 MG/DL (ref 8.3–10.6)
CHLORIDE SERPL-SCNC: 100 MMOL/L (ref 99–110)
CO2 SERPL-SCNC: 24 MMOL/L (ref 21–32)
CREAT SERPL-MCNC: 0.9 MG/DL (ref 0.8–1.3)
EKG ATRIAL RATE: 91 BPM
EKG DIAGNOSIS: NORMAL
EKG Q-T INTERVAL: 414 MS
EKG QRS DURATION: 92 MS
EKG QTC CALCULATION (BAZETT): 402 MS
EKG R AXIS: -49 DEGREES
EKG T AXIS: 33 DEGREES
EKG VENTRICULAR RATE: 57 BPM
ERYTHROCYTE [DISTWIDTH] IN BLOOD BY AUTOMATED COUNT: 14.4 % (ref 11.7–14.9)
GFR, ESTIMATED: 83 ML/MIN/1.73M2
GLUCOSE SERPL-MCNC: 103 MG/DL (ref 74–99)
HCT VFR BLD AUTO: 44.8 % (ref 42–52)
HGB BLD-MCNC: 14.3 G/DL (ref 13.5–18)
INR PPP: 2
MAGNESIUM SERPL-MCNC: 1.9 MG/DL (ref 1.8–2.4)
MCH RBC QN AUTO: 30.2 PG (ref 27–31)
MCHC RBC AUTO-ENTMCNC: 31.9 G/DL (ref 32–36)
MCV RBC AUTO: 94.7 FL (ref 78–100)
PARTIAL THROMBOPLASTIN TIME: 32.8 SEC (ref 25.1–37.1)
PHOSPHATE SERPL-MCNC: 3.5 MG/DL (ref 2.5–4.9)
PLATELET # BLD AUTO: 262 K/UL (ref 140–440)
PMV BLD AUTO: 10.4 FL (ref 7.5–11.1)
POTASSIUM SERPL-SCNC: 3.8 MMOL/L (ref 3.5–5.1)
PROTHROMBIN TIME: 23.2 SEC (ref 11.7–14.5)
RBC # BLD AUTO: 4.73 M/UL (ref 4.6–6.2)
SODIUM SERPL-SCNC: 136 MMOL/L (ref 136–145)
WBC OTHER # BLD: 11.1 K/UL (ref 4–10.5)

## 2025-04-28 PROCEDURE — 93010 ELECTROCARDIOGRAM REPORT: CPT | Performed by: INTERNAL MEDICINE

## 2025-04-28 PROCEDURE — 84100 ASSAY OF PHOSPHORUS: CPT

## 2025-04-28 PROCEDURE — 86901 BLOOD TYPING SEROLOGIC RH(D): CPT

## 2025-04-28 PROCEDURE — 86900 BLOOD TYPING SEROLOGIC ABO: CPT

## 2025-04-28 PROCEDURE — 85027 COMPLETE CBC AUTOMATED: CPT

## 2025-04-28 PROCEDURE — 71046 X-RAY EXAM CHEST 2 VIEWS: CPT

## 2025-04-28 PROCEDURE — 86850 RBC ANTIBODY SCREEN: CPT

## 2025-04-28 PROCEDURE — 80048 BASIC METABOLIC PNL TOTAL CA: CPT

## 2025-04-28 PROCEDURE — 83735 ASSAY OF MAGNESIUM: CPT

## 2025-04-28 PROCEDURE — 85730 THROMBOPLASTIN TIME PARTIAL: CPT

## 2025-04-28 PROCEDURE — 85610 PROTHROMBIN TIME: CPT

## 2025-04-28 NOTE — PROGRESS NOTES
Patient here in office and educated on 04/28/25, scheduled for Transesophageal Echocardiogram on 05/01/25 @ 0800, with arrival @ 0700, @ [facility]; consents signed. Copy of orders given for labs and CXR due 04/28/25 at [facility]. Instructions given to patient to :NPO after midnight including water the night before procedure. May take rest of morning medications day of procedure except the following;  Hold Hydrochlorothiazide the morning of the procedure. Please continue taking Coumadin as directed. Patient voiced understanding. Copies of consent & info scanned in chart.    Patient here in office and educated on 04/28/25, scheduled for Watchman Implant on 05/08/25 @ 0700, with arrival @ 0530, @ [facility]; consents signed. Copy of orders given for labs and CXR due 04/28/25 at [facility]. Instructions given to patient to :NPO after midnight including water the night before procedure. May take rest of morning medications day of procedure except the following;  Continue taking Aspirin as directed. Hold Diuretics the morning of the procedure. If taking Coumadin patient is to call the office after labs drawn, for results and instructions. Hold ALL blood pressure medications morning of procedure. Advised patient to plan for an overnight stay in the hospital. Patient voiced understanding. Copies of consent & info scanned in chart.

## 2025-04-29 ENCOUNTER — TELEPHONE (OUTPATIENT)
Dept: CARDIOLOGY CLINIC | Age: 75
End: 2025-04-29

## 2025-04-29 DIAGNOSIS — I48.19 ATRIAL FIBRILLATION, PERSISTENT (HCC): Primary | ICD-10-CM

## 2025-04-29 NOTE — TELEPHONE ENCOUNTER
Called patient and advised to continue coumadin for NIA this week and to have PT/PTT-INR repeat labs on Monday 5/5/2025. Patient stated understanding.

## 2025-05-01 ENCOUNTER — HOSPITAL ENCOUNTER (OUTPATIENT)
Dept: NON INVASIVE DIAGNOSTICS | Age: 75
Discharge: HOME | End: 2025-05-03
Attending: INTERNAL MEDICINE
Payer: MEDICARE

## 2025-05-01 VITALS
OXYGEN SATURATION: 99 % | TEMPERATURE: 98.1 F | RESPIRATION RATE: 14 BRPM | HEART RATE: 59 BPM | DIASTOLIC BLOOD PRESSURE: 64 MMHG | SYSTOLIC BLOOD PRESSURE: 120 MMHG

## 2025-05-01 DIAGNOSIS — I48.19 PERSISTENT ATRIAL FIBRILLATION (HCC): ICD-10-CM

## 2025-05-01 PROCEDURE — 7100000011 HC PHASE II RECOVERY - ADDTL 15 MIN: Performed by: INTERNAL MEDICINE

## 2025-05-01 PROCEDURE — 99152 MOD SED SAME PHYS/QHP 5/>YRS: CPT | Performed by: INTERNAL MEDICINE

## 2025-05-01 PROCEDURE — 6360000002 HC RX W HCPCS: Performed by: INTERNAL MEDICINE

## 2025-05-01 PROCEDURE — 7100000010 HC PHASE II RECOVERY - FIRST 15 MIN: Performed by: INTERNAL MEDICINE

## 2025-05-01 PROCEDURE — 93325 DOPPLER ECHO COLOR FLOW MAPG: CPT

## 2025-05-01 PROCEDURE — 6370000000 HC RX 637 (ALT 250 FOR IP): Performed by: INTERNAL MEDICINE

## 2025-05-01 RX ORDER — MIDAZOLAM HYDROCHLORIDE 1 MG/ML
INJECTION, SOLUTION INTRAMUSCULAR; INTRAVENOUS PRN
Status: COMPLETED | OUTPATIENT
Start: 2025-05-01 | End: 2025-05-01

## 2025-05-01 RX ORDER — FENTANYL CITRATE 50 UG/ML
INJECTION, SOLUTION INTRAMUSCULAR; INTRAVENOUS PRN
Status: COMPLETED | OUTPATIENT
Start: 2025-05-01 | End: 2025-05-01

## 2025-05-01 RX ORDER — LIDOCAINE HYDROCHLORIDE 20 MG/ML
SOLUTION OROPHARYNGEAL PRN
Status: COMPLETED | OUTPATIENT
Start: 2025-05-01 | End: 2025-05-01

## 2025-05-01 RX ADMIN — BENZOCAINE 1 PACKAGE: 200 SPRAY DENTAL; ORAL; PERIODONTAL at 07:38

## 2025-05-01 RX ADMIN — MIDAZOLAM 1 MG: 1 INJECTION INTRAMUSCULAR; INTRAVENOUS at 07:39

## 2025-05-01 RX ADMIN — MIDAZOLAM 1 MG: 1 INJECTION INTRAMUSCULAR; INTRAVENOUS at 07:35

## 2025-05-01 RX ADMIN — FENTANYL CITRATE 25 MCG: 50 INJECTION, SOLUTION INTRAMUSCULAR; INTRAVENOUS at 07:35

## 2025-05-01 RX ADMIN — LIDOCAINE HYDROCHLORIDE 15 ML: 20 SOLUTION ORAL at 07:33

## 2025-05-01 ASSESSMENT — ENCOUNTER SYMPTOMS
CHEST TIGHTNESS: 0
WHEEZING: 0
COUGH: 0
BACK PAIN: 0
SHORTNESS OF BREATH: 0
BLOOD IN STOOL: 0
PHOTOPHOBIA: 0
NAUSEA: 0
CONSTIPATION: 0
COLOR CHANGE: 0
COUGH: 0
CHEST TIGHTNESS: 0
BLOOD IN STOOL: 0
WHEEZING: 0
EYE PAIN: 0
PHOTOPHOBIA: 0
CONSTIPATION: 0
NAUSEA: 0
ABDOMINAL PAIN: 0
EYE PAIN: 0
VOMITING: 0
BACK PAIN: 0
COLOR CHANGE: 0
ABDOMINAL PAIN: 0
DIARRHEA: 0
SHORTNESS OF BREATH: 0
VOMITING: 0
DIARRHEA: 0

## 2025-05-01 NOTE — H&P
05/01/25 0800 05/01/25 0808 05/01/25 0820 05/01/25 0824   BP: 96/68 97/67 117/76 120/64   Pulse: 51 58 55 59   Resp: 11 18 13 14   Temp:       TempSrc:       SpO2: 95% 97% 99% 99%        IMPRESSION / RECOMMENDATIONS:       Assessment & Plan    Long standing persistent atrial fibrillation (non valvular) sp previous ablation x 2 at [facility] -patient on Coumadin, metoprolol  DM-2 mentioned in history but appears to be not on any medications at this time -diet controlled  Obesity BMI 40  HTN on losartan, hydrochlorothiazide amlodipine blood pressure is controlled  HLD not on any statins at this time diet control  SEDRICK  Osteoarthritis  Hypercoagulable secondary to atrial fibrillation   9.   Hematuria - multiple episodes needing interruption of coumadin  10. CAD non obstructive based on Cardiac CT at [facility]    Patient is in longstanding persistent atrial fibrillation with previous 2 ablations at [facility].  Patient has not been symptomatic from atrial fibrillation but he has been having hematuria multiple episodes of despite evaluation on Coumadin and he is needing to interrupt the Coumadin and is worried about stroke risk and bleeding risk and wants to consider about watchman.    Atrial Fibrillation CHADSVASC2 Score Stroke Risk:   74 y.o. 65-74 - 1   male Male - 0   CHF HX: No - 0   HTN HX: Yes - 1   Stroke/TIA/Thromboembolism No - 0   Vascular Disease HX: Yes - 1   Diabetes Mellitus Yes - 1   CHADSVASC 2 Score 4      Annual Stroke Risk 4.8%- moderate-high           HAS-BLED Score                            Risk Factors      Points   Hypertension  Uncontrolled, >160 mmHg systolic   No=0   Renal disease  Dialysis, transplant, Cr>2.26 mg/dL or >200 µmol/L   No=0   Liver disease   Cirrhosis or bilirubin >2x normal with AST/ALT/AP >3x normal   No=0   Stroke history   No=0   Prior major bleeding or predisposition to bleeding     Yes=1   Labile  INR  Unstable/high INRs, time in therapeutic range <60%   Yes=1   Age >65   Yes=1   Medication

## 2025-05-01 NOTE — PROCEDURES
PROCEDURE NOTE  Date: 5/1/2025   Name: Hay Boyer  YOB: 1950    Procedures      Procedure     Conscious sedation for NIA     ASA 3  Mallampati 3     After obtaining form consent patient was brought to the holding area and underwent conscious sedation with Versed , fentanyl, remained hemodynamically stable  Patient is hemodynamic status, neuro status was evaluated before conscious sedation    Patient's blood pressure, heart rate, pulse ox, neuro status was monitored for 30 minutes post procedure and she remained stable    The details of the conscious sedation is in the flowsheet in epic

## 2025-05-05 ENCOUNTER — HOSPITAL ENCOUNTER (OUTPATIENT)
Age: 75
Discharge: HOME | End: 2025-05-05
Payer: MEDICARE

## 2025-05-05 ENCOUNTER — TELEPHONE (OUTPATIENT)
Dept: CARDIOLOGY CLINIC | Age: 75
End: 2025-05-05

## 2025-05-05 DIAGNOSIS — I48.19 ATRIAL FIBRILLATION, PERSISTENT (HCC): ICD-10-CM

## 2025-05-05 LAB
INR PPP: 2.2
PARTIAL THROMBOPLASTIN TIME: 34.3 SEC (ref 25.1–37.1)
PROTHROMBIN TIME: 24.4 SEC (ref 11.7–14.5)

## 2025-05-05 PROCEDURE — 85610 PROTHROMBIN TIME: CPT

## 2025-05-05 PROCEDURE — 85730 THROMBOPLASTIN TIME PARTIAL: CPT

## 2025-05-05 NOTE — TELEPHONE ENCOUNTER
Called patient and advised to continue taking coumadin and Angelina CUELLO RN will call patient with final medication instructions. Patient stated understanding.

## 2025-05-06 ENCOUNTER — TELEPHONE (OUTPATIENT)
Age: 75
End: 2025-05-06

## 2025-05-06 RX ORDER — CLOPIDOGREL BISULFATE 75 MG/1
75 TABLET ORAL DAILY
Qty: 90 TABLET | Refills: 0 | Status: ON HOLD | OUTPATIENT
Start: 2025-05-06 | End: 2025-05-08 | Stop reason: HOSPADM

## 2025-05-06 NOTE — TELEPHONE ENCOUNTER
Attempted to call the patient regarding medication addition prior to Watchman.  No answer. Message left for call back. Patient instructed medication will be started 5/7/2025 PM.  Will follow up  Electronically signed by Kristina Bazzi RN on 5/6/2025 at 4:03 PM Watchman Care Coordinator

## 2025-05-06 NOTE — TELEPHONE ENCOUNTER
Felicita pre call done. Spoke to Elizabeth lisa wife.    Medication changes made:    Plavix: Start PM 5/7/2025 (Take dose am 5/8/2025 with sip of water)  ASA: Take dose am 5/8/2025 with sip of water  Coumadin: Last dose 5/7/2025 PM    Patient verbalizes understanding of these changes    Insurance approval obtained:  [x] Yes     [] No    Shared Decision  Date obtained: 4/21/2025  Physician: Dr Beltran  [] Office     [x] Media tab    All questions verbalized answered.   Will follow.    Electronically signed by Kristina Bazzi RN on 5/6/2025 at 5:33 PM [place] Care Coordinator

## 2025-05-07 ENCOUNTER — ANESTHESIA EVENT (OUTPATIENT)
Age: 75
End: 2025-05-07
Payer: MEDICARE

## 2025-05-07 RX ORDER — SODIUM CHLORIDE 9 MG/ML
INJECTION, SOLUTION INTRAVENOUS PRN
Status: CANCELLED | OUTPATIENT
Start: 2025-05-07

## 2025-05-07 RX ORDER — SODIUM CHLORIDE 0.9 % (FLUSH) 0.9 %
5-40 SYRINGE (ML) INJECTION EVERY 12 HOURS SCHEDULED
Status: CANCELLED | OUTPATIENT
Start: 2025-05-07

## 2025-05-07 RX ORDER — SODIUM CHLORIDE 0.9 % (FLUSH) 0.9 %
5-40 SYRINGE (ML) INJECTION PRN
Status: CANCELLED | OUTPATIENT
Start: 2025-05-07

## 2025-05-07 ASSESSMENT — LIFESTYLE VARIABLES: SMOKING_STATUS: 0

## 2025-05-07 NOTE — ANESTHESIA PRE PROCEDURE
Department of Anesthesiology  Preprocedure Note       Name:  Hay Boyer   Age:  74 y.o.  :  1950                                          MRN:  [id number]         Date:  2025      Surgeon: Surgeon(s):  Carlos Hillman MD    Procedure: Procedure(s):  Watchman jennifer closure device    Medications prior to admission:   Prior to Admission medications    Medication Sig Start Date End Date Taking? Authorizing Provider   clopidogrel (PLAVIX) 75 MG tablet Take 1 tablet by mouth daily 25   Ranta Ferrer, APRN - CNP   amLODIPine (NORVASC) 5 MG tablet Take 1 tablet by mouth once daily 3/26/25   Rivas Beltran MD   hydroCHLOROthiazide (HYDRODIURIL) 25 MG tablet Take 1 tablet by mouth once daily 3/26/25   Rivas Beltran MD   losartan (COZAAR) 100 MG tablet Take 1 tablet by mouth once daily 3/26/25   Rivas Beltran MD   potassium chloride (KLOR-CON M) 20 MEQ extended release tablet Take 1 tablet by mouth once daily 3/26/25   Rivas Beltran MD   metoprolol tartrate (LOPRESSOR) 25 MG tablet Take 1 tablet by mouth 2 times daily 3/18/25   Kizzy Macias MD   warfarin (COUMADIN) 2.5 MG tablet TAKE ONE TABLET BY MOUTH ONCE DAILY IN THE EVENING EXCEPT ON , NO THERAPY 10/4/24   Rivas Beltran MD   azelastine (OPTIVAR) 0.05 % ophthalmic solution Place 1 drop into both eyes 2 times daily 11/10/23   Rivas Beltarn MD   Aspirin Buf,CaCarb-MgCarb-MgO, 81 MG TABS Take 1 tablet by mouth daily    Provider, MD Lizette       Current medications:    No current facility-administered medications for this encounter.     Current Outpatient Medications   Medication Sig Dispense Refill   • clopidogrel (PLAVIX) 75 MG tablet Take 1 tablet by mouth daily 90 tablet 0   • amLODIPine (NORVASC) 5 MG tablet Take 1 tablet by mouth once daily 90 tablet 0   • hydroCHLOROthiazide (HYDRODIURIL) 25 MG tablet Take 1 tablet by mouth once daily 90 tablet 0   • losartan (COZAAR) 100 MG

## 2025-05-08 ENCOUNTER — HOSPITAL ENCOUNTER (INPATIENT)
Age: 75
LOS: 1 days | Discharge: HOME | End: 2025-05-08
Attending: INTERNAL MEDICINE | Admitting: INTERNAL MEDICINE
Payer: MEDICARE

## 2025-05-08 ENCOUNTER — ANESTHESIA (OUTPATIENT)
Age: 75
End: 2025-05-08
Payer: MEDICARE

## 2025-05-08 ENCOUNTER — APPOINTMENT (OUTPATIENT)
Dept: NON INVASIVE DIAGNOSTICS | Age: 75
End: 2025-05-08
Attending: INTERNAL MEDICINE
Payer: MEDICARE

## 2025-05-08 VITALS
RESPIRATION RATE: 20 BRPM | SYSTOLIC BLOOD PRESSURE: 121 MMHG | DIASTOLIC BLOOD PRESSURE: 70 MMHG | OXYGEN SATURATION: 98 % | TEMPERATURE: 97.6 F | HEART RATE: 80 BPM | BODY MASS INDEX: 39.17 KG/M2 | HEIGHT: 75 IN | WEIGHT: 315 LBS

## 2025-05-08 DIAGNOSIS — I48.19 ATRIAL FIBRILLATION, PERSISTENT (HCC): ICD-10-CM

## 2025-05-08 DIAGNOSIS — I48.91 UNSPECIFIED ATRIAL FIBRILLATION (HCC): Primary | ICD-10-CM

## 2025-05-08 PROBLEM — Z95.818 PRESENCE OF WATCHMAN LEFT ATRIAL APPENDAGE CLOSURE DEVICE: Status: ACTIVE | Noted: 2025-05-08

## 2025-05-08 LAB
ABO/RH: NORMAL
ANTIBODY SCREEN: NEGATIVE
BLOOD BANK COMMENT: NORMAL
BLOOD BANK DISPENSE STATUS: NORMAL
BLOOD BANK SAMPLE EXPIRATION: NORMAL
BPU ID: NORMAL
COMPONENT: NORMAL
CROSSMATCH RESULT: NORMAL
ECHO BSA: 2.78 M2
ECHO BSA: 2.78 M2
INR PPP: 2.4
PROTHROMBIN TIME: 26 SEC (ref 11.7–14.5)
TRANSFUSION STATUS: NORMAL
UNIT DIVISION: 0
UNIT TAG COMMENT: NORMAL

## 2025-05-08 PROCEDURE — 33340 PERQ CLSR TCAT L ATR APNDGE: CPT | Performed by: INTERNAL MEDICINE

## 2025-05-08 PROCEDURE — C1760 CLOSURE DEV, VASC: HCPCS | Performed by: INTERNAL MEDICINE

## 2025-05-08 PROCEDURE — 1200000000 HC SEMI PRIVATE

## 2025-05-08 PROCEDURE — 2580000003 HC RX 258

## 2025-05-08 PROCEDURE — 7100000001 HC PACU RECOVERY - ADDTL 15 MIN: Performed by: INTERNAL MEDICINE

## 2025-05-08 PROCEDURE — 2709999900 HC NON-CHARGEABLE SUPPLY: Performed by: INTERNAL MEDICINE

## 2025-05-08 PROCEDURE — 2500000003 HC RX 250 WO HCPCS

## 2025-05-08 PROCEDURE — 93320 DOPPLER ECHO COMPLETE: CPT | Performed by: INTERNAL MEDICINE

## 2025-05-08 PROCEDURE — 02L73DK OCCLUSION OF LEFT ATRIAL APPENDAGE WITH INTRALUMINAL DEVICE, PERCUTANEOUS APPROACH: ICD-10-PCS | Performed by: INTERNAL MEDICINE

## 2025-05-08 PROCEDURE — 93325 DOPPLER ECHO COLOR FLOW MAPG: CPT

## 2025-05-08 PROCEDURE — 85610 PROTHROMBIN TIME: CPT

## 2025-05-08 PROCEDURE — 3700000001 HC ADD 15 MINUTES (ANESTHESIA): Performed by: INTERNAL MEDICINE

## 2025-05-08 PROCEDURE — 7100000010 HC PHASE II RECOVERY - FIRST 15 MIN: Performed by: INTERNAL MEDICINE

## 2025-05-08 PROCEDURE — 6360000004 HC RX CONTRAST MEDICATION: Performed by: INTERNAL MEDICINE

## 2025-05-08 PROCEDURE — 7100000011 HC PHASE II RECOVERY - ADDTL 15 MIN: Performed by: INTERNAL MEDICINE

## 2025-05-08 PROCEDURE — 7100000000 HC PACU RECOVERY - FIRST 15 MIN: Performed by: INTERNAL MEDICINE

## 2025-05-08 PROCEDURE — C1889 IMPLANT/INSERT DEVICE, NOC: HCPCS | Performed by: INTERNAL MEDICINE

## 2025-05-08 PROCEDURE — B24BZZ4 ULTRASONOGRAPHY OF HEART WITH AORTA, TRANSESOPHAGEAL: ICD-10-PCS | Performed by: INTERNAL MEDICINE

## 2025-05-08 PROCEDURE — C1894 INTRO/SHEATH, NON-LASER: HCPCS | Performed by: INTERNAL MEDICINE

## 2025-05-08 PROCEDURE — 6360000002 HC RX W HCPCS: Performed by: INTERNAL MEDICINE

## 2025-05-08 PROCEDURE — 3700000000 HC ANESTHESIA ATTENDED CARE: Performed by: INTERNAL MEDICINE

## 2025-05-08 PROCEDURE — 93319 3D ECHO IMG CGEN CAR ANOMAL: CPT | Performed by: INTERNAL MEDICINE

## 2025-05-08 PROCEDURE — 99238 HOSP IP/OBS DSCHRG MGMT 30/<: CPT | Performed by: NURSE PRACTITIONER

## 2025-05-08 PROCEDURE — C1769 GUIDE WIRE: HCPCS | Performed by: INTERNAL MEDICINE

## 2025-05-08 PROCEDURE — 93312 ECHO TRANSESOPHAGEAL: CPT | Performed by: INTERNAL MEDICINE

## 2025-05-08 PROCEDURE — 6360000002 HC RX W HCPCS

## 2025-05-08 DEVICE — LEFT ATRIAL APPENDAGE CLOSURE DEVICE WITH DELIVERY SYSTEM
Type: IMPLANTABLE DEVICE | Site: HEART | Status: FUNCTIONAL
Brand: WATCHMAN FLX™ PRO

## 2025-05-08 RX ORDER — LABETALOL HYDROCHLORIDE 5 MG/ML
10 INJECTION, SOLUTION INTRAVENOUS
Status: DISCONTINUED | OUTPATIENT
Start: 2025-05-08 | End: 2025-05-08 | Stop reason: HOSPADM

## 2025-05-08 RX ORDER — SODIUM CHLORIDE 9 MG/ML
INJECTION, SOLUTION INTRAVENOUS PRN
Status: DISCONTINUED | OUTPATIENT
Start: 2025-05-08 | End: 2025-05-08 | Stop reason: HOSPADM

## 2025-05-08 RX ORDER — ONDANSETRON 2 MG/ML
4 INJECTION INTRAMUSCULAR; INTRAVENOUS EVERY 6 HOURS PRN
Status: DISCONTINUED | OUTPATIENT
Start: 2025-05-08 | End: 2025-05-08 | Stop reason: HOSPADM

## 2025-05-08 RX ORDER — LIDOCAINE HYDROCHLORIDE 20 MG/ML
INJECTION, SOLUTION EPIDURAL; INFILTRATION; INTRACAUDAL; PERINEURAL
Status: DISCONTINUED | OUTPATIENT
Start: 2025-05-08 | End: 2025-05-08 | Stop reason: SDUPTHER

## 2025-05-08 RX ORDER — SODIUM CHLORIDE 9 MG/ML
INJECTION, SOLUTION INTRAVENOUS
Status: DISCONTINUED | OUTPATIENT
Start: 2025-05-08 | End: 2025-05-08 | Stop reason: SDUPTHER

## 2025-05-08 RX ORDER — PROPOFOL 10 MG/ML
INJECTION, EMULSION INTRAVENOUS
Status: DISCONTINUED | OUTPATIENT
Start: 2025-05-08 | End: 2025-05-08 | Stop reason: SDUPTHER

## 2025-05-08 RX ORDER — HEPARIN SODIUM 1000 [USP'U]/ML
INJECTION, SOLUTION INTRAVENOUS; SUBCUTANEOUS
Status: DISCONTINUED | OUTPATIENT
Start: 2025-05-08 | End: 2025-05-08 | Stop reason: SDUPTHER

## 2025-05-08 RX ORDER — ONDANSETRON 2 MG/ML
4 INJECTION INTRAMUSCULAR; INTRAVENOUS
Status: DISCONTINUED | OUTPATIENT
Start: 2025-05-08 | End: 2025-05-08 | Stop reason: HOSPADM

## 2025-05-08 RX ORDER — ONDANSETRON 4 MG/1
4 TABLET, ORALLY DISINTEGRATING ORAL EVERY 8 HOURS PRN
Status: DISCONTINUED | OUTPATIENT
Start: 2025-05-08 | End: 2025-05-08 | Stop reason: HOSPADM

## 2025-05-08 RX ORDER — SODIUM CHLORIDE 0.9 % (FLUSH) 0.9 %
5-40 SYRINGE (ML) INJECTION EVERY 12 HOURS SCHEDULED
Status: DISCONTINUED | OUTPATIENT
Start: 2025-05-08 | End: 2025-05-08 | Stop reason: HOSPADM

## 2025-05-08 RX ORDER — SODIUM CHLORIDE 0.9 % (FLUSH) 0.9 %
5-40 SYRINGE (ML) INJECTION PRN
Status: DISCONTINUED | OUTPATIENT
Start: 2025-05-08 | End: 2025-05-08 | Stop reason: HOSPADM

## 2025-05-08 RX ORDER — ACETAMINOPHEN 325 MG/1
650 TABLET ORAL EVERY 4 HOURS PRN
Status: DISCONTINUED | OUTPATIENT
Start: 2025-05-08 | End: 2025-05-08 | Stop reason: HOSPADM

## 2025-05-08 RX ORDER — PROTAMINE SULFATE 10 MG/ML
INJECTION, SOLUTION INTRAVENOUS
Status: DISCONTINUED | OUTPATIENT
Start: 2025-05-08 | End: 2025-05-08 | Stop reason: SDUPTHER

## 2025-05-08 RX ORDER — METOCLOPRAMIDE HYDROCHLORIDE 5 MG/ML
10 INJECTION INTRAMUSCULAR; INTRAVENOUS
Status: DISCONTINUED | OUTPATIENT
Start: 2025-05-08 | End: 2025-05-08 | Stop reason: HOSPADM

## 2025-05-08 RX ORDER — NALOXONE HYDROCHLORIDE 0.4 MG/ML
INJECTION, SOLUTION INTRAMUSCULAR; INTRAVENOUS; SUBCUTANEOUS PRN
Status: DISCONTINUED | OUTPATIENT
Start: 2025-05-08 | End: 2025-05-08 | Stop reason: HOSPADM

## 2025-05-08 RX ORDER — ONDANSETRON 2 MG/ML
INJECTION INTRAMUSCULAR; INTRAVENOUS
Status: DISCONTINUED | OUTPATIENT
Start: 2025-05-08 | End: 2025-05-08 | Stop reason: SDUPTHER

## 2025-05-08 RX ORDER — CEFAZOLIN SODIUM 1 G/3ML
INJECTION, POWDER, FOR SOLUTION INTRAMUSCULAR; INTRAVENOUS
Status: DISCONTINUED | OUTPATIENT
Start: 2025-05-08 | End: 2025-05-08 | Stop reason: SDUPTHER

## 2025-05-08 RX ORDER — ROCURONIUM BROMIDE 10 MG/ML
INJECTION, SOLUTION INTRAVENOUS
Status: DISCONTINUED | OUTPATIENT
Start: 2025-05-08 | End: 2025-05-08 | Stop reason: SDUPTHER

## 2025-05-08 RX ORDER — FENTANYL CITRATE 50 UG/ML
25 INJECTION, SOLUTION INTRAMUSCULAR; INTRAVENOUS EVERY 5 MIN PRN
Status: DISCONTINUED | OUTPATIENT
Start: 2025-05-08 | End: 2025-05-08 | Stop reason: HOSPADM

## 2025-05-08 RX ORDER — IOPAMIDOL 755 MG/ML
INJECTION, SOLUTION INTRAVASCULAR PRN
Status: DISCONTINUED | OUTPATIENT
Start: 2025-05-08 | End: 2025-05-08 | Stop reason: HOSPADM

## 2025-05-08 RX ORDER — DEXAMETHASONE SODIUM PHOSPHATE 4 MG/ML
INJECTION, SOLUTION INTRA-ARTICULAR; INTRALESIONAL; INTRAMUSCULAR; INTRAVENOUS; SOFT TISSUE
Status: DISCONTINUED | OUTPATIENT
Start: 2025-05-08 | End: 2025-05-08 | Stop reason: SDUPTHER

## 2025-05-08 RX ADMIN — SUGAMMADEX 400 MG: 100 INJECTION, SOLUTION INTRAVENOUS at 08:34

## 2025-05-08 RX ADMIN — PROPOFOL 200 MG: 10 INJECTION, EMULSION INTRAVENOUS at 07:17

## 2025-05-08 RX ADMIN — ROCURONIUM BROMIDE 50 MG: 10 INJECTION INTRAVENOUS at 07:20

## 2025-05-08 RX ADMIN — DEXAMETHASONE SODIUM PHOSPHATE 8 MG: 4 INJECTION, SOLUTION INTRAMUSCULAR; INTRAVENOUS at 07:30

## 2025-05-08 RX ADMIN — PROTAMINE SULFATE 30 MG: 10 INJECTION, SOLUTION INTRAVENOUS at 08:21

## 2025-05-08 RX ADMIN — ROCURONIUM BROMIDE 100 MG: 10 INJECTION INTRAVENOUS at 07:17

## 2025-05-08 RX ADMIN — HEPARIN SODIUM 12000 UNITS: 1000 INJECTION, SOLUTION INTRAVENOUS; SUBCUTANEOUS at 07:55

## 2025-05-08 RX ADMIN — SODIUM CHLORIDE: 9 INJECTION, SOLUTION INTRAVENOUS at 07:04

## 2025-05-08 RX ADMIN — CEFAZOLIN 3 G: 1 INJECTION, POWDER, FOR SOLUTION INTRAMUSCULAR; INTRAVENOUS; PARENTERAL at 07:39

## 2025-05-08 RX ADMIN — LIDOCAINE HYDROCHLORIDE 100 MG: 20 INJECTION, SOLUTION EPIDURAL; INFILTRATION; INTRACAUDAL; PERINEURAL at 07:17

## 2025-05-08 RX ADMIN — ONDANSETRON 4 MG: 2 INJECTION INTRAMUSCULAR; INTRAVENOUS at 07:30

## 2025-05-08 ASSESSMENT — ENCOUNTER SYMPTOMS
SHORTNESS OF BREATH: 0
DIARRHEA: 0
BLOOD IN STOOL: 0
NAUSEA: 0
CONSTIPATION: 0
PHOTOPHOBIA: 0
CHEST TIGHTNESS: 0
EYE PAIN: 0
BACK PAIN: 0
ABDOMINAL PAIN: 0
VOMITING: 0
WHEEZING: 0
COLOR CHANGE: 0
COUGH: 0

## 2025-05-08 NOTE — PROGRESS NOTES
Received from PACU.  Monitor and alarms on. Call Light in reach. Pt c/o numbness mid lower lip. Denies any other facial numbness. Patient alert and oriented with symmetrical smile and facial movements.   and push pulls equal on bilat upper and lower extremities. Patient sts made staff aware of numbness prior to returning to Cath Lab.  Family at bedside.  Call light in reach.

## 2025-05-08 NOTE — ANESTHESIA POSTPROCEDURE EVALUATION
Department of Anesthesiology  Postprocedure Note    Patient: Hay Boyer  MRN: [id number]  YOB: 1950  Date of evaluation: 5/8/2025    Procedure Summary       Date: 05/08/25 Room / Location: [facility] CATH LAB  / [facility] CARDIAC CATH LAB    Anesthesia Start: 0704 Anesthesia Stop: 0849    Procedure: Watchman jennifer closure device Diagnosis:       Atrial fibrillation, persistent (HCC)      (Atrial fibrillation, persistent (HCC) [I48.19])    Providers: Carlos Hillman MD Responsible Provider: Aiden Garcia MD    Anesthesia Type: general ASA Status: 3            Anesthesia Type: No value filed.    Estrella Phase I: Estrella Score: 8    Estrella Phase II:      Anesthesia Post Evaluation    Patient location during evaluation: PACU  Patient participation: complete - patient participated  Level of consciousness: awake and alert  Airway patency: patent  Nausea & Vomiting: no nausea and no vomiting  Cardiovascular status: hemodynamically stable  Respiratory status: spontaneous ventilation and room air  Hydration status: euvolemic  Pain management: adequate    No notable events documented.  
Dr. Alex Lora [phone]

## 2025-05-08 NOTE — DISCHARGE INSTRUCTIONS
Discharge Home Instuctions  Name:Hay Boyer  :1950    Your instructions:    Shower only for the first 5 days, NO TUB BATHS.      Wash procedure site with mild soap, rinse and pat dry.  Do not rub over the procedure site for two (2) days.  Remove dressing and replace with a band-aid in 2 days.    Site observations-Observe the area for bleeding or hematoma (lump under the skin caused by bleeding.)      A.  Painless bruising is normal.  B.  If a firmness/swelling seems to be increasing or there is bright red bleeding from site       (hold pressure over procedure site) and  CALL 911 IMMEDIATELY.    What to do after you leave the hospital:    Recommended activity: no lifting, Driving, or Strenuous exercise for 3 days.  DO NOT lift more than 10lbs.    For your procedure you may have been given a sedative to help you relax. This drug will make you sleepy. It is usually given in a vein (by IV).  Don't do anything for 24 hours that requires attention to detail. It takes time for the medicine effects to completely wear off.  Do not sign any legally binding contracts or documents over the next 24 hours.  For your safety, you should not drive or operate any machinery that could be dangerous until the medicine wears off and you can think clearly and react easily.    Follow-up with physician in 7-10 days.    Take your medication as ordered.      If you have any questions, you may call [phone] or your physicians office

## 2025-05-08 NOTE — H&P
Electrophysiology h&P Note      Reason for consultation:  atrial fibrillation    Chief complaint : here for watchman implantation    Referring physician:       Primary care physician: Rivas Beltran MD      History of Present Illness:     Today visit (05/08/25)    Patient here for watchman implantation. No change in H&P noted from previous clinic visit.       Previous visit:   History of Present Illness    Patient is a 74-year-old male with a history of hypertension, hyperlipidemia, longstanding persistent atrial fibrillation status post 2 ablations at [facility], obstructive sleep apnea, cardiomyopathy, nonobstructive coronary artery referred by  for watchman assessment.    He has been experiencing episodes of bleeding over the past few months, which is concerning given his current anticoagulant therapy with Coumadin.  Patient reports he has intermittent hematuria episodes and he has to have interruption in Coumadin.  He is worried about risk of bleeding and risk of stroke long-term.  He discussed with Dr. Meadows about coming off of Coumadin and then  referred him to us.      He has not had any prior discussions regarding the Watchman device. His medical history includes 2 ablation procedures performed at [facility] to manage his atrial fibrillation, the first of which was effective for a year, while the second provided relief for only 30 days. He reports no history of cardiac stent placement.    Patient denies alcohol, tobacco. Patient drinks Tea occasionally. Patient denies exercise.    Denies chest pain, shortness of breath at rest, palpitations, syncope or presyncope, edema   Has some chronic fatigue      MEDICATIONS  Coumadin         Pastmedical history:   Past Medical History:   Diagnosis Date    Allergic rhinitis 05/24/2019    Atrial fibrillation (HCC)     Atrial fibrillation (HCC)     Cancer (HCC)     skin

## 2025-05-08 NOTE — DISCHARGE SUMMARY
[facility]  Discharge Summary    Hay Boyer  :  1950  MRN:  [id number]    Admit date:  2025  Discharge date:      Admitting Physician:  Carlos Hillman MD    Discharge Diagnoses:     1. Sp Watchman device procedure  2. Atrial fibrillation  3. Hypercoagulable due to atrial fibrillation         Admission Condition:  fair    Discharged Condition:  good    Hospital Course:   Patient with hx of Long standing persistent atrial fibrillation (non valvular), DM-2, Obesity, HTN, HLD, SEDRICK, Osteoarthritis, Hypercoagulable secondary to atrial fibrillation, Hematuria - multiple episodes needing interruption of coumadin CAD non obstructive based on Cardiac CT at [facility] with CHADS-VAS score of 4 and Has bled score of 4 with high risk of bleeding with anticoagulation and high risk of stroke with out anticoagulation here for MARYLOU closure device watchman implantation . Patient tolerated the procedure well. Observed overnight. Patient groin access site was clean, no hematoma and no tenderness, No bruit. Echo showed no pericardial effusion.  Patient stable for discharge with out patient follow up.    Patient given instructions of continuing anticoagulation and aspirin for 45 days  Then he will get a NIA at 45 days and based on the NIA results his anticoagulation course will be decided    Patient is doing well following WATCHMAN implant.  Ambulating without any issues      Discharge Medications:      Current Discharge Medication List             Details   amLODIPine (NORVASC) 5 MG tablet Take 1 tablet by mouth once daily  Qty: 90 tablet, Refills: 0    Associated Diagnoses: Essential hypertension      hydroCHLOROthiazide (HYDRODIURIL) 25 MG tablet Take 1 tablet by mouth once daily  Qty: 90 tablet, Refills: 0      losartan (COZAAR) 100 MG tablet Take 1 tablet by mouth once daily  Qty: 90 tablet, Refills: 0      potassium chloride (KLOR-CON M) 20 MEQ extended release tablet Take 1 tablet by mouth once daily  Qty: 90 tablet,

## 2025-05-08 NOTE — ANESTHESIA PROCEDURE NOTES
Airway  Date/Time: 5/8/2025 7:22 AM  Urgency: elective    Airway not difficult    General Information and Staff    Patient location during procedure: OR  Resident/CRNA: Richard Salvador APRN - CRNA  Performed: resident/CRNA/CAA   Performed by: Richard Salvador APRN - CRNA  Authorized by: Aiden Garcia MD      Indications and Patient Condition  Indications for airway management: anesthesia and airway protection  Spontaneous Ventilation: absent  Sedation level: deep  Preoxygenated: yes  Patient position: reverse Trendelenburg  MILS not maintained throughout  Mask difficulty assessment: vent by bag mask + OA or adjuvant +/- NMBA    Final Airway Details  Final airway type: endotracheal airway      Successful airway: ETT  Cuffed: yes   Successful intubation technique: video laryngoscopy  Facilitating devices/methods: intubating stylet  Endotracheal tube insertion site: oral  Blade: Vang  Blade size: #4  ETT size (mm): 7.5  Cormack-Lehane Classification: grade I - full view of glottis  Placement verified by: chest auscultation, bronchoscopy and capnometry   Measured from: lips  ETT to lips (cm): 23  Number of attempts at approach: 1  Ventilation between attempts: bag mask  Number of other approaches attempted: 0    no

## 2025-05-08 NOTE — PROGRESS NOTES
Dc instructions reviewed with pt he verbalizes understanding.  R groin drsg dry and intact no hematoma.  Pt changed into clothes.  Pt instructed to resume all meds

## 2025-05-08 NOTE — PROCEDURES
PROCEDURE NOTE  Date: 5/8/2025   Name: Hay Boyer  YOB: 1950    Procedures        Procedure     Conscious sedation for NIA     ASA 3  Mallampati 3     After obtaining form consent patient was brought to the holding area and underwent conscious sedation with Versed , fentanyl, remained hemodynamically stable  Patient is hemodynamic status, neuro status was evaluated before conscious sedation    Patient's blood pressure, heart rate, pulse ox, neuro status was monitored for 30 minutes post procedure and she remained stable    The details of the conscious sedation is in the flowsheet in epic

## 2025-05-08 NOTE — FLOWSHEET NOTE
Assisted pt up to BR. Ambulated with standby assist of RN without difficulties. Right groin remains free of bleeding/hematoma

## 2025-05-08 NOTE — PROGRESS NOTES
0845- pt received from EP Lab. Monitors placed and alarms on. Report received from Richard DAVALOS. Pt drowsy but arouses to name being called.  0900- pt resting comfortably. Denies any pain or nausea.  0910- pt resting comfortably. Denies any needs.  0920- pt resting comfortably. Denies any pain or nausea.  0929- pt transported to Cath Lab Holding by RN and bedside report given to Anabel NIX and Rosemary NIX.

## 2025-05-12 ENCOUNTER — OFFICE VISIT (OUTPATIENT)
Dept: FAMILY MEDICINE CLINIC | Age: 75
End: 2025-05-12
Payer: MEDICARE

## 2025-05-12 VITALS
HEART RATE: 45 BPM | HEIGHT: 75 IN | BODY MASS INDEX: 39.17 KG/M2 | WEIGHT: 315 LBS | SYSTOLIC BLOOD PRESSURE: 112 MMHG | OXYGEN SATURATION: 100 % | RESPIRATION RATE: 16 BRPM | DIASTOLIC BLOOD PRESSURE: 70 MMHG

## 2025-05-12 DIAGNOSIS — Z95.818 PRESENCE OF WATCHMAN LEFT ATRIAL APPENDAGE CLOSURE DEVICE: ICD-10-CM

## 2025-05-12 DIAGNOSIS — I10 ESSENTIAL HYPERTENSION: ICD-10-CM

## 2025-05-12 DIAGNOSIS — E66.813 CLASS 3 SEVERE OBESITY DUE TO EXCESS CALORIES WITHOUT SERIOUS COMORBIDITY WITH BODY MASS INDEX (BMI) OF 40.0 TO 44.9 IN ADULT (HCC): ICD-10-CM

## 2025-05-12 DIAGNOSIS — E78.5 HYPERLIPIDEMIA, UNSPECIFIED HYPERLIPIDEMIA TYPE: ICD-10-CM

## 2025-05-12 DIAGNOSIS — G47.33 OBSTRUCTIVE SLEEP APNEA SYNDROME: ICD-10-CM

## 2025-05-12 DIAGNOSIS — I10 PRIMARY HYPERTENSION: ICD-10-CM

## 2025-05-12 DIAGNOSIS — M1A.09X0 IDIOPATHIC CHRONIC GOUT OF MULTIPLE SITES WITHOUT TOPHUS: ICD-10-CM

## 2025-05-12 DIAGNOSIS — E11.9 TYPE 2 DIABETES MELLITUS WITHOUT COMPLICATION, WITHOUT LONG-TERM CURRENT USE OF INSULIN (HCC): ICD-10-CM

## 2025-05-12 DIAGNOSIS — I48.91 ATRIAL FIBRILLATION, UNSPECIFIED TYPE (HCC): ICD-10-CM

## 2025-05-12 DIAGNOSIS — I48.19 ATRIAL FIBRILLATION, PERSISTENT (HCC): ICD-10-CM

## 2025-05-12 DIAGNOSIS — E11.9 TYPE 2 DIABETES MELLITUS WITHOUT COMPLICATION, WITHOUT LONG-TERM CURRENT USE OF INSULIN (HCC): Primary | ICD-10-CM

## 2025-05-12 LAB
INTERNATIONAL NORMALIZATION RATIO, POC: 2.2
PROTHROMBIN TIME, POC: 26.8

## 2025-05-12 PROCEDURE — 99214 OFFICE O/P EST MOD 30 MIN: CPT | Performed by: FAMILY MEDICINE

## 2025-05-12 PROCEDURE — 36415 COLL VENOUS BLD VENIPUNCTURE: CPT | Performed by: FAMILY MEDICINE

## 2025-05-12 PROCEDURE — 3078F DIAST BP <80 MM HG: CPT | Performed by: FAMILY MEDICINE

## 2025-05-12 PROCEDURE — 85610 PROTHROMBIN TIME: CPT | Performed by: FAMILY MEDICINE

## 2025-05-12 PROCEDURE — 3074F SYST BP LT 130 MM HG: CPT | Performed by: FAMILY MEDICINE

## 2025-05-12 PROCEDURE — 1159F MED LIST DOCD IN RCRD: CPT | Performed by: FAMILY MEDICINE

## 2025-05-12 PROCEDURE — 1123F ACP DISCUSS/DSCN MKR DOCD: CPT | Performed by: FAMILY MEDICINE

## 2025-05-12 RX ORDER — HYDROCHLOROTHIAZIDE 25 MG/1
25 TABLET ORAL DAILY
Qty: 90 TABLET | Refills: 0 | Status: SHIPPED | OUTPATIENT
Start: 2025-05-12

## 2025-05-12 RX ORDER — AZELASTINE HYDROCHLORIDE 0.5 MG/ML
1 SOLUTION/ DROPS OPHTHALMIC 2 TIMES DAILY
Qty: 1 EACH | Refills: 5 | Status: SHIPPED | OUTPATIENT
Start: 2025-05-12

## 2025-05-12 RX ORDER — WARFARIN SODIUM 2.5 MG/1
TABLET ORAL
Qty: 90 TABLET | Refills: 1 | Status: SHIPPED | OUTPATIENT
Start: 2025-05-12

## 2025-05-12 RX ORDER — LOSARTAN POTASSIUM 100 MG/1
100 TABLET ORAL DAILY
Qty: 90 TABLET | Refills: 0 | Status: SHIPPED | OUTPATIENT
Start: 2025-05-12

## 2025-05-12 RX ORDER — AMLODIPINE BESYLATE 5 MG/1
5 TABLET ORAL DAILY
Qty: 90 TABLET | Refills: 0 | Status: SHIPPED | OUTPATIENT
Start: 2025-05-12

## 2025-05-12 RX ORDER — ROSUVASTATIN CALCIUM 5 MG/1
5 TABLET, COATED ORAL NIGHTLY
Qty: 30 TABLET | Refills: 3 | Status: SHIPPED | OUTPATIENT
Start: 2025-05-12

## 2025-05-12 RX ORDER — POTASSIUM CHLORIDE 1500 MG/1
20 TABLET, EXTENDED RELEASE ORAL DAILY
Qty: 90 TABLET | Refills: 0 | Status: SHIPPED | OUTPATIENT
Start: 2025-05-12

## 2025-05-12 ASSESSMENT — PATIENT HEALTH QUESTIONNAIRE - PHQ9
5. POOR APPETITE OR OVEREATING: NOT AT ALL
6. FEELING BAD ABOUT YOURSELF - OR THAT YOU ARE A FAILURE OR HAVE LET YOURSELF OR YOUR FAMILY DOWN: NOT AT ALL
SUM OF ALL RESPONSES TO PHQ QUESTIONS 1-9: 1
10. IF YOU CHECKED OFF ANY PROBLEMS, HOW DIFFICULT HAVE THESE PROBLEMS MADE IT FOR YOU TO DO YOUR WORK, TAKE CARE OF THINGS AT HOME, OR GET ALONG WITH OTHER PEOPLE: NOT DIFFICULT AT ALL
SUM OF ALL RESPONSES TO PHQ QUESTIONS 1-9: 1
8. MOVING OR SPEAKING SO SLOWLY THAT OTHER PEOPLE COULD HAVE NOTICED. OR THE OPPOSITE, BEING SO FIGETY OR RESTLESS THAT YOU HAVE BEEN MOVING AROUND A LOT MORE THAN USUAL: NOT AT ALL
4. FEELING TIRED OR HAVING LITTLE ENERGY: NOT AT ALL
1. LITTLE INTEREST OR PLEASURE IN DOING THINGS: NOT AT ALL
3. TROUBLE FALLING OR STAYING ASLEEP: SEVERAL DAYS
7. TROUBLE CONCENTRATING ON THINGS, SUCH AS READING THE NEWSPAPER OR WATCHING TELEVISION: NOT AT ALL
9. THOUGHTS THAT YOU WOULD BE BETTER OFF DEAD, OR OF HURTING YOURSELF: NOT AT ALL
2. FEELING DOWN, DEPRESSED OR HOPELESS: NOT AT ALL
SUM OF ALL RESPONSES TO PHQ QUESTIONS 1-9: 1
SUM OF ALL RESPONSES TO PHQ QUESTIONS 1-9: 1

## 2025-05-12 ASSESSMENT — ENCOUNTER SYMPTOMS
NAUSEA: 0
DIARRHEA: 0
EYE PAIN: 0
BLOOD IN STOOL: 0
ABDOMINAL PAIN: 0
SHORTNESS OF BREATH: 0
CHEST TIGHTNESS: 0
TROUBLE SWALLOWING: 0
WHEEZING: 0
APNEA: 1
VOMITING: 0

## 2025-05-12 NOTE — PROGRESS NOTES
5/12/2025    Hay Boyer    Chief Complaint   Patient presents with    6 Month Follow-Up    Coagulation Disorder     INR    Other     Since January had hospital stay, bladder stone removed, bleeds and watchman put in.     Health Maintenance     Declines AWV.   Declines colonoscopy.   Has not had eye exam.   Declines foot exam.        HPI  History was obtained from the patient.  Hay is a 74 y.o. male who presents today with routine recheck.  Patient with history of hyperlipidemia, allergies, venous insufficiency, history of ED, history of A-fib status post Watchman procedure, had previous bladder surgery that led to increased bleeding on blood thinner.  Other issues include sleep apnea, obesity, increased gout symptoms, diabetes mellitus type 2, as well as arthralgia and hypertension.  Patient had recently placed Watchman procedure because of bladder bleeding.  Patient declines colonoscopy and annual wellness Medicare.  On review she he is due for statin we will place him on a moderately low-dose Crestor to reduce his cardiovascular risk factors and a type II diabetic.  Patient does need to get a diabetic eye exam.  Otherwise refills provided and we will check lab work.    REVIEW OF SYMPTOMS    Review of Systems   Constitutional:  Negative for activity change and fatigue.   HENT:  Negative for congestion, hearing loss, mouth sores and trouble swallowing.    Eyes:  Negative for pain and visual disturbance.   Respiratory:  Positive for apnea. Negative for chest tightness, shortness of breath and wheezing.    Cardiovascular:  Positive for leg swelling. Negative for chest pain and palpitations.        Recent Watchman procedure because of blood loss and urinary tract.  Doing well currently.  Has past history of A-fib, venous insufficiency, hypertension hyperlipidemia.   Gastrointestinal:  Negative for abdominal pain, blood in stool, diarrhea, nausea and vomiting.   Endocrine: Negative for cold intolerance,

## 2025-05-12 NOTE — PROGRESS NOTES
confirmed current warfarin 2.5 mg x 6 days and 0 on Monday.  INR= 2.2. keep same and recheck in 4 weeks.

## 2025-05-13 ENCOUNTER — RESULTS FOLLOW-UP (OUTPATIENT)
Dept: FAMILY MEDICINE CLINIC | Age: 75
End: 2025-05-13

## 2025-05-13 LAB
ALBUMIN SERPL-MCNC: 3.9 G/DL (ref 3.4–5)
ALBUMIN/GLOB SERPL: 1.4 {RATIO} (ref 1.1–2.2)
ALP SERPL-CCNC: 66 U/L (ref 40–129)
ALT SERPL-CCNC: 27 U/L (ref 10–40)
ANION GAP SERPL CALCULATED.3IONS-SCNC: 10 MMOL/L (ref 3–16)
AST SERPL-CCNC: 30 U/L (ref 15–37)
BILIRUB SERPL-MCNC: 0.9 MG/DL (ref 0–1)
BUN SERPL-MCNC: 22 MG/DL (ref 7–20)
CALCIUM SERPL-MCNC: 9.5 MG/DL (ref 8.3–10.6)
CHLORIDE SERPL-SCNC: 100 MMOL/L (ref 99–110)
CO2 SERPL-SCNC: 28 MMOL/L (ref 21–32)
CREAT SERPL-MCNC: 0.9 MG/DL (ref 0.8–1.3)
CREAT UR-MCNC: 56.6 MG/DL (ref 39–259)
DEPRECATED RDW RBC AUTO: 15.1 % (ref 12.4–15.4)
EST. AVERAGE GLUCOSE BLD GHB EST-MCNC: 131.2 MG/DL
GFR SERPLBLD CREATININE-BSD FMLA CKD-EPI: >90 ML/MIN/{1.73_M2}
GLUCOSE SERPL-MCNC: 130 MG/DL (ref 70–99)
HBA1C MFR BLD: 6.2 %
HCT VFR BLD AUTO: 42.8 % (ref 40.5–52.5)
HGB BLD-MCNC: 14.4 G/DL (ref 13.5–17.5)
MCH RBC QN AUTO: 30.2 PG (ref 26–34)
MCHC RBC AUTO-ENTMCNC: 33.6 G/DL (ref 31–36)
MCV RBC AUTO: 90 FL (ref 80–100)
MICROALBUMIN UR DL<=1MG/L-MCNC: <1.2 MG/DL
MICROALBUMIN/CREAT UR: NORMAL MG/G (ref 0–30)
PLATELET # BLD AUTO: 271 K/UL (ref 135–450)
PMV BLD AUTO: 8.3 FL (ref 5–10.5)
POTASSIUM SERPL-SCNC: 4.3 MMOL/L (ref 3.5–5.1)
PROT SERPL-MCNC: 6.7 G/DL (ref 6.4–8.2)
RBC # BLD AUTO: 4.76 M/UL (ref 4.2–5.9)
SODIUM SERPL-SCNC: 138 MMOL/L (ref 136–145)
WBC # BLD AUTO: 8.9 K/UL (ref 4–11)

## 2025-05-15 ENCOUNTER — OFFICE VISIT (OUTPATIENT)
Age: 75
End: 2025-05-15
Payer: MEDICARE

## 2025-05-15 VITALS
HEIGHT: 75 IN | DIASTOLIC BLOOD PRESSURE: 76 MMHG | BODY MASS INDEX: 39.17 KG/M2 | WEIGHT: 315 LBS | SYSTOLIC BLOOD PRESSURE: 110 MMHG | HEART RATE: 51 BPM

## 2025-05-15 DIAGNOSIS — I48.19 PERSISTENT ATRIAL FIBRILLATION (HCC): ICD-10-CM

## 2025-05-15 DIAGNOSIS — I10 PRIMARY HYPERTENSION: ICD-10-CM

## 2025-05-15 DIAGNOSIS — I48.19 HYPERCOAGULABLE STATE DUE TO PERSISTENT ATRIAL FIBRILLATION (HCC): ICD-10-CM

## 2025-05-15 DIAGNOSIS — Z95.818 PRESENCE OF WATCHMAN LEFT ATRIAL APPENDAGE CLOSURE DEVICE: Primary | ICD-10-CM

## 2025-05-15 DIAGNOSIS — D68.69 HYPERCOAGULABLE STATE DUE TO PERSISTENT ATRIAL FIBRILLATION (HCC): ICD-10-CM

## 2025-05-15 PROCEDURE — 1159F MED LIST DOCD IN RCRD: CPT | Performed by: NURSE PRACTITIONER

## 2025-05-15 PROCEDURE — 3078F DIAST BP <80 MM HG: CPT | Performed by: NURSE PRACTITIONER

## 2025-05-15 PROCEDURE — 1123F ACP DISCUSS/DSCN MKR DOCD: CPT | Performed by: NURSE PRACTITIONER

## 2025-05-15 PROCEDURE — 99214 OFFICE O/P EST MOD 30 MIN: CPT | Performed by: NURSE PRACTITIONER

## 2025-05-15 PROCEDURE — 93000 ELECTROCARDIOGRAM COMPLETE: CPT | Performed by: NURSE PRACTITIONER

## 2025-05-15 PROCEDURE — 3074F SYST BP LT 130 MM HG: CPT | Performed by: NURSE PRACTITIONER

## 2025-05-15 ASSESSMENT — ENCOUNTER SYMPTOMS
COLOR CHANGE: 0
SINUS PRESSURE: 0
SINUS PAIN: 0
ABDOMINAL DISTENTION: 0
BLOOD IN STOOL: 0
ABDOMINAL PAIN: 0
COUGH: 0
BACK PAIN: 0
PHOTOPHOBIA: 0
SHORTNESS OF BREATH: 0

## 2025-05-15 NOTE — PROGRESS NOTES
Electrophysiology follow up Note      Reason for consultation:  atrial fibrillation    Chief complaint: 1 week follow up s/p watchman    Referring physician:       Primary care physician: Rivas Beltran MD      History of Present Illness:     This visit 5/15/2025  Patient here today for follow-up on on recent watchman  Patient reports he has been feeling well.  He denies chest pain, palpitations, shortness of breath, lightheadedness, dizziness, edema or syncope    Previous visit:   History of Present Illness    Patient is a 74-year-old male with a history of hypertension, hyperlipidemia, longstanding persistent atrial fibrillation status post 2 ablations at [facility], obstructive sleep apnea, cardiomyopathy, nonobstructive coronary artery referred by  for watchman assessment.    He has been experiencing episodes of bleeding over the past few months, which is concerning given his current anticoagulant therapy with Coumadin.  Patient reports he has intermittent hematuria episodes and he has to have interruption in Coumadin.  He is worried about risk of bleeding and risk of stroke long-term.  He discussed with Dr. Meadows about coming off of Coumadin and then  referred him to us.      He has not had any prior discussions regarding the Watchman device. His medical history includes 2 ablation procedures performed at [facility] to manage his atrial fibrillation, the first of which was effective for a year, while the second provided relief for only 30 days. He reports no history of cardiac stent placement.    Patient denies alcohol, tobacco. Patient drinks Tea occasionally. Patient denies exercise.    Denies chest pain, shortness of breath at rest, palpitations, syncope or presyncope, edema   Has some chronic fatigue      MEDICATIONS  Coumadin         Pastmedical history:   Past Medical History:   Diagnosis Date    Allergic rhinitis

## 2025-05-16 ENCOUNTER — TELEPHONE (OUTPATIENT)
Dept: CARDIOLOGY CLINIC | Age: 75
End: 2025-05-16

## 2025-05-16 DIAGNOSIS — I48.19 PERSISTENT ATRIAL FIBRILLATION (HCC): Primary | ICD-10-CM

## 2025-05-16 NOTE — TELEPHONE ENCOUNTER
----- Message from ZI Peace CNP sent at 5/15/2025 11:58 AM EDT -----  Follow up NIA in 45 to 59 days

## 2025-05-16 NOTE — TELEPHONE ENCOUNTER
Called patient and advised of procedure dates and times of procedure;    PPW 6/23/2025 @ 1330    NIA 6/24/2025 @ 1300

## 2025-05-16 NOTE — TELEPHONE ENCOUNTER
Called patient and r/s procedure dates and times d/t patients daughter will be in town. New dates are;    PPW 6/30/2025 @ 0900    NIA 7/1/2025 @ 1000

## 2025-05-22 LAB
ACTIVATED CLOTTING TIME, LOW RANGE: 177 SEC (ref 89–169)
ACTIVATED CLOTTING TIME, LOW RANGE: >400 SEC (ref 89–169)

## 2025-06-09 ENCOUNTER — CLINICAL SUPPORT (OUTPATIENT)
Dept: FAMILY MEDICINE CLINIC | Age: 75
End: 2025-06-09

## 2025-06-09 DIAGNOSIS — I48.19 ATRIAL FIBRILLATION, PERSISTENT (HCC): Primary | ICD-10-CM

## 2025-06-09 LAB — INTERNATIONAL NORMALIZATION RATIO, POC: 2.6

## 2025-06-09 NOTE — PROGRESS NOTES
confirmed current warfarin 2.5 mg x 6 days and 0 on Monday.  INR= 2.6 keep same and recheck in 4 weeks if not starting Plavix.

## 2025-06-20 DIAGNOSIS — I10 ESSENTIAL HYPERTENSION: ICD-10-CM

## 2025-06-20 RX ORDER — LOSARTAN POTASSIUM 100 MG/1
100 TABLET ORAL DAILY
Qty: 90 TABLET | Refills: 0 | Status: SHIPPED | OUTPATIENT
Start: 2025-06-20

## 2025-06-20 RX ORDER — AMLODIPINE BESYLATE 5 MG/1
5 TABLET ORAL DAILY
Qty: 90 TABLET | Refills: 0 | Status: SHIPPED | OUTPATIENT
Start: 2025-06-20

## 2025-06-20 RX ORDER — HYDROCHLOROTHIAZIDE 25 MG/1
25 TABLET ORAL DAILY
Qty: 90 TABLET | Refills: 0 | Status: SHIPPED | OUTPATIENT
Start: 2025-06-20

## 2025-06-20 NOTE — TELEPHONE ENCOUNTER
Appt 0  Lv 5/12/25    Requested Prescriptions     Signed Prescriptions Disp Refills    amLODIPine (NORVASC) 5 MG tablet 90 tablet 0     Sig: Take 1 tablet by mouth once daily     Authorizing Provider: DEVI QUIÑONEZ     Ordering User: OMER LUCAS    hydroCHLOROthiazide (HYDRODIURIL) 25 MG tablet 90 tablet 0     Sig: Take 1 tablet by mouth once daily     Authorizing Provider: DEVI QUIÑONEZ     Ordering User: OMER LUCAS    losartan (COZAAR) 100 MG tablet 90 tablet 0     Sig: Take 1 tablet by mouth once daily     Authorizing Provider: DEVI QUIÑONEZ     Ordering User: OMER LUCAS

## 2025-06-30 ENCOUNTER — HOSPITAL ENCOUNTER (OUTPATIENT)
Age: 75
Discharge: HOME | End: 2025-06-30
Payer: MEDICARE

## 2025-06-30 ENCOUNTER — CLINICAL SUPPORT (OUTPATIENT)
Age: 75
End: 2025-06-30

## 2025-06-30 DIAGNOSIS — I48.19 PERSISTENT ATRIAL FIBRILLATION (HCC): ICD-10-CM

## 2025-06-30 DIAGNOSIS — Z95.818 PRESENCE OF WATCHMAN LEFT ATRIAL APPENDAGE CLOSURE DEVICE: Primary | ICD-10-CM

## 2025-06-30 LAB
ANION GAP SERPL CALCULATED.3IONS-SCNC: 12 MMOL/L (ref 9–17)
BUN SERPL-MCNC: 17 MG/DL (ref 7–20)
CALCIUM SERPL-MCNC: 9.2 MG/DL (ref 8.3–10.6)
CHLORIDE SERPL-SCNC: 100 MMOL/L (ref 99–110)
CO2 SERPL-SCNC: 25 MMOL/L (ref 21–32)
CREAT SERPL-MCNC: 1 MG/DL (ref 0.8–1.3)
ERYTHROCYTE [DISTWIDTH] IN BLOOD BY AUTOMATED COUNT: 13.2 % (ref 11.7–14.9)
GFR, ESTIMATED: 75 ML/MIN/1.73M2
GLUCOSE SERPL-MCNC: 156 MG/DL (ref 74–99)
HCT VFR BLD AUTO: 46.6 % (ref 42–52)
HGB BLD-MCNC: 15.3 G/DL (ref 13.5–18)
MAGNESIUM SERPL-MCNC: 1.8 MG/DL (ref 1.8–2.4)
MCH RBC QN AUTO: 29.5 PG (ref 27–31)
MCHC RBC AUTO-ENTMCNC: 32.8 G/DL (ref 32–36)
MCV RBC AUTO: 90 FL (ref 78–100)
PHOSPHATE SERPL-MCNC: 3.1 MG/DL (ref 2.5–4.9)
PLATELET # BLD AUTO: 254 K/UL (ref 140–440)
PMV BLD AUTO: 10.3 FL (ref 7.5–11.1)
POTASSIUM SERPL-SCNC: 4.1 MMOL/L (ref 3.5–5.1)
RBC # BLD AUTO: 5.18 M/UL (ref 4.6–6.2)
SODIUM SERPL-SCNC: 137 MMOL/L (ref 136–145)
WBC OTHER # BLD: 10.2 K/UL (ref 4–10.5)

## 2025-06-30 PROCEDURE — 84100 ASSAY OF PHOSPHORUS: CPT

## 2025-06-30 PROCEDURE — 85027 COMPLETE CBC AUTOMATED: CPT

## 2025-06-30 PROCEDURE — 80048 BASIC METABOLIC PNL TOTAL CA: CPT

## 2025-06-30 PROCEDURE — 83735 ASSAY OF MAGNESIUM: CPT

## 2025-06-30 NOTE — PROGRESS NOTES
Patient here in office and educated on 06/30/25, scheduled for Transesophageal Echocardiogram on 07/01/25 @ 1000, with arrival @ 0900, @ [facility]; consents signed. Copy of orders given for labs due 06/30/25 at [facility]. Instructions given to patient to :NPO after midnight including water the night before procedure. May take rest of morning medications day of procedure except the following; Please continue to take Coumadin (Warfarin) as directed. If you are taking Hydrochlorothiazide, please hold the morning of the procedure. Patient voiced understanding. Copies of consent & info scanned in chart.

## 2025-07-01 ENCOUNTER — HOSPITAL ENCOUNTER (OUTPATIENT)
Dept: NON INVASIVE DIAGNOSTICS | Age: 75
Discharge: HOME | End: 2025-07-03
Attending: INTERNAL MEDICINE
Payer: MEDICARE

## 2025-07-01 VITALS
RESPIRATION RATE: 13 BRPM | SYSTOLIC BLOOD PRESSURE: 105 MMHG | HEART RATE: 43 BPM | HEIGHT: 75 IN | OXYGEN SATURATION: 96 % | DIASTOLIC BLOOD PRESSURE: 58 MMHG | BODY MASS INDEX: 39.17 KG/M2 | WEIGHT: 315 LBS

## 2025-07-01 DIAGNOSIS — I48.19 PERSISTENT ATRIAL FIBRILLATION (HCC): ICD-10-CM

## 2025-07-01 LAB — ECHO BSA: 2.77 M2

## 2025-07-01 PROCEDURE — 6360000002 HC RX W HCPCS: Performed by: INTERNAL MEDICINE

## 2025-07-01 PROCEDURE — 93312 ECHO TRANSESOPHAGEAL: CPT | Performed by: INTERNAL MEDICINE

## 2025-07-01 PROCEDURE — 7100000011 HC PHASE II RECOVERY - ADDTL 15 MIN: Performed by: INTERNAL MEDICINE

## 2025-07-01 PROCEDURE — 6370000000 HC RX 637 (ALT 250 FOR IP): Performed by: INTERNAL MEDICINE

## 2025-07-01 PROCEDURE — 7100000010 HC PHASE II RECOVERY - FIRST 15 MIN: Performed by: INTERNAL MEDICINE

## 2025-07-01 PROCEDURE — 99152 MOD SED SAME PHYS/QHP 5/>YRS: CPT | Performed by: INTERNAL MEDICINE

## 2025-07-01 PROCEDURE — 93325 DOPPLER ECHO COLOR FLOW MAPG: CPT | Performed by: INTERNAL MEDICINE

## 2025-07-01 PROCEDURE — 93325 DOPPLER ECHO COLOR FLOW MAPG: CPT

## 2025-07-01 RX ORDER — CLOPIDOGREL BISULFATE 75 MG/1
75 TABLET ORAL DAILY
Qty: 90 TABLET | Refills: 1 | Status: SHIPPED | OUTPATIENT
Start: 2025-07-01

## 2025-07-01 RX ORDER — FENTANYL CITRATE 50 UG/ML
INJECTION, SOLUTION INTRAMUSCULAR; INTRAVENOUS PRN
Status: COMPLETED | OUTPATIENT
Start: 2025-07-01 | End: 2025-07-01

## 2025-07-01 RX ORDER — MIDAZOLAM HYDROCHLORIDE 1 MG/ML
INJECTION, SOLUTION INTRAMUSCULAR; INTRAVENOUS PRN
Status: COMPLETED | OUTPATIENT
Start: 2025-07-01 | End: 2025-07-01

## 2025-07-01 RX ADMIN — MIDAZOLAM 1 MG: 1 INJECTION INTRAMUSCULAR; INTRAVENOUS at 10:04

## 2025-07-01 RX ADMIN — FENTANYL CITRATE 25 MCG: 50 INJECTION, SOLUTION INTRAMUSCULAR; INTRAVENOUS at 10:03

## 2025-07-01 RX ADMIN — MIDAZOLAM 1 MG: 1 INJECTION INTRAMUSCULAR; INTRAVENOUS at 10:03

## 2025-07-01 RX ADMIN — BENZOCAINE 1 SPRAY: 200 SPRAY DENTAL; ORAL; PERIODONTAL at 09:59

## 2025-07-01 ASSESSMENT — ENCOUNTER SYMPTOMS
ABDOMINAL PAIN: 0
SINUS PAIN: 0
ABDOMINAL DISTENTION: 0
PHOTOPHOBIA: 0
SHORTNESS OF BREATH: 0
SINUS PRESSURE: 0
COUGH: 0
BLOOD IN STOOL: 0
BACK PAIN: 0
COLOR CHANGE: 0

## 2025-07-01 NOTE — H&P
Electrophysiology H&P Note      Reason for consultation:  atrial fibrillation    Chief complaint: POST WATCHMAN ENEIDA > 45DAYS    Referring physician:       Primary care physician: Rivas Beltran MD      History of Present Illness:     Today visit (07/01/25)    Patient here for POSTWATCHMAN eneida>45days. No change in H&P noted from previous clinic visit.     Previous visit 5/15/2025  Patient here today for follow-up on on recent watchman  Patient reports he has been feeling well.  He denies chest pain, palpitations, shortness of breath, lightheadedness, dizziness, edema or syncope    Previous visit:   History of Present Illness    Patient is a 74-year-old male with a history of hypertension, hyperlipidemia, longstanding persistent atrial fibrillation status post 2 ablations at [facility], obstructive sleep apnea, cardiomyopathy, nonobstructive coronary artery referred by  for watchman assessment.    He has been experiencing episodes of bleeding over the past few months, which is concerning given his current anticoagulant therapy with Coumadin.  Patient reports he has intermittent hematuria episodes and he has to have interruption in Coumadin.  He is worried about risk of bleeding and risk of stroke long-term.  He discussed with Dr. Meadows about coming off of Coumadin and then  referred him to us.      He has not had any prior discussions regarding the Watchman device. His medical history includes 2 ablation procedures performed at [facility] to manage his atrial fibrillation, the first of which was effective for a year, while the second provided relief for only 30 days. He reports no history of cardiac stent placement.    Patient denies alcohol, tobacco. Patient drinks Tea occasionally. Patient denies exercise.    Denies chest pain, shortness of breath at rest, palpitations, syncope or presyncope, edema   Has some chronic

## 2025-07-01 NOTE — PROCEDURES
PROCEDURE NOTE  Date: 7/1/2025   Name: Hay Boyer  YOB: 1950    Procedures      Procedure     Conscious sedation for NIA     ASA 3  Mallampati 3     After obtaining form consent patient was brought to the holding area and underwent conscious sedation with Versed , fentanyl, remained hemodynamically stable  Patient is hemodynamic status, neuro status was evaluated before conscious sedation    Patient's blood pressure, heart rate, pulse ox, neuro status was monitored for 30 minutes post procedure and she remained stable    The details of the conscious sedation is in the flowsheet in epic

## 2025-07-28 ENCOUNTER — APPOINTMENT (OUTPATIENT)
Dept: CT IMAGING | Age: 75
End: 2025-07-28
Payer: MEDICARE

## 2025-07-28 ENCOUNTER — HOSPITAL ENCOUNTER (INPATIENT)
Age: 75
LOS: 4 days | Discharge: HOME | End: 2025-08-01
Attending: STUDENT IN AN ORGANIZED HEALTH CARE EDUCATION/TRAINING PROGRAM | Admitting: STUDENT IN AN ORGANIZED HEALTH CARE EDUCATION/TRAINING PROGRAM
Payer: MEDICARE

## 2025-07-28 ENCOUNTER — CARE COORDINATION (OUTPATIENT)
Dept: CARE COORDINATION | Age: 75
End: 2025-07-28

## 2025-07-28 DIAGNOSIS — I26.99 ACUTE PULMONARY EMBOLISM, UNSPECIFIED PULMONARY EMBOLISM TYPE, UNSPECIFIED WHETHER ACUTE COR PULMONALE PRESENT (HCC): Primary | ICD-10-CM

## 2025-07-28 LAB
ALBUMIN SERPL-MCNC: 4 G/DL (ref 3.4–5)
ALBUMIN/GLOB SERPL: 1.4 {RATIO} (ref 1.1–2.2)
ALP SERPL-CCNC: 76 U/L (ref 40–129)
ALT SERPL-CCNC: 15 U/L (ref 10–40)
ANION GAP SERPL CALCULATED.3IONS-SCNC: 12 MMOL/L (ref 9–17)
AST SERPL-CCNC: 22 U/L (ref 15–37)
BASOPHILS # BLD: 0.05 K/UL
BASOPHILS NFR BLD: 0 % (ref 0–1)
BILIRUB SERPL-MCNC: 1.1 MG/DL (ref 0–1)
BILIRUB UR QL STRIP: NEGATIVE
BUN SERPL-MCNC: 19 MG/DL (ref 7–20)
CALCIUM SERPL-MCNC: 9.2 MG/DL (ref 8.3–10.6)
CHARACTER UR: ABNORMAL
CHLORIDE SERPL-SCNC: 101 MMOL/L (ref 99–110)
CLARITY UR: CLEAR
CO2 SERPL-SCNC: 26 MMOL/L (ref 21–32)
COLOR UR: YELLOW
CREAT SERPL-MCNC: 0.9 MG/DL (ref 0.8–1.3)
EOSINOPHIL # BLD: 0.14 K/UL
EOSINOPHILS RELATIVE PERCENT: 1 % (ref 0–3)
ERYTHROCYTE [DISTWIDTH] IN BLOOD BY AUTOMATED COUNT: 14.6 % (ref 11.7–14.9)
GFR, ESTIMATED: 79 ML/MIN/1.73M2
GLUCOSE SERPL-MCNC: 144 MG/DL (ref 74–99)
GLUCOSE UR STRIP-MCNC: NEGATIVE MG/DL
HCT VFR BLD AUTO: 45 % (ref 42–52)
HGB BLD-MCNC: 15.1 G/DL (ref 13.5–18)
HGB UR QL STRIP.AUTO: ABNORMAL
IMM GRANULOCYTES # BLD AUTO: 0.05 K/UL
IMM GRANULOCYTES NFR BLD: 0 %
KETONES UR STRIP-MCNC: NEGATIVE MG/DL
LEUKOCYTE ESTERASE UR QL STRIP: NEGATIVE
LYMPHOCYTES NFR BLD: 2.69 K/UL
LYMPHOCYTES RELATIVE PERCENT: 23 % (ref 24–44)
MCH RBC QN AUTO: 29.4 PG (ref 27–31)
MCHC RBC AUTO-ENTMCNC: 33.6 G/DL (ref 32–36)
MCV RBC AUTO: 87.7 FL (ref 78–100)
MONOCYTES NFR BLD: 1.34 K/UL
MONOCYTES NFR BLD: 11 % (ref 0–5)
MUCOUS THREADS URNS QL MICRO: ABNORMAL
NEUTROPHILS NFR BLD: 64 % (ref 36–66)
NEUTS SEG NFR BLD: 7.48 K/UL
NITRITE UR QL STRIP: NEGATIVE
PH UR STRIP: 6 [PH] (ref 5–8)
PLATELET # BLD AUTO: 237 K/UL (ref 140–440)
PMV BLD AUTO: 9.8 FL (ref 7.5–11.1)
POTASSIUM SERPL-SCNC: 4.4 MMOL/L (ref 3.5–5.1)
PROT SERPL-MCNC: 7 G/DL (ref 6.4–8.2)
PROT UR STRIP-MCNC: ABNORMAL MG/DL
RBC # BLD AUTO: 5.13 M/UL (ref 4.6–6.2)
RBC #/AREA URNS HPF: 2 /HPF (ref 0–2)
SODIUM SERPL-SCNC: 138 MMOL/L (ref 136–145)
SP GR UR STRIP: >1.03 (ref 1–1.03)
TROPONIN I SERPL HS-MCNC: 24 NG/L (ref 0–22)
UROBILINOGEN UR STRIP-ACNC: 0.2 EU/DL (ref 0–1)
WBC #/AREA URNS HPF: 1 /HPF (ref 0–5)
WBC OTHER # BLD: 11.8 K/UL (ref 4–10.5)

## 2025-07-28 PROCEDURE — 85025 COMPLETE CBC W/AUTO DIFF WBC: CPT

## 2025-07-28 PROCEDURE — 80053 COMPREHEN METABOLIC PANEL: CPT

## 2025-07-28 PROCEDURE — 71275 CT ANGIOGRAPHY CHEST: CPT

## 2025-07-28 PROCEDURE — 6360000004 HC RX CONTRAST MEDICATION: Performed by: PHYSICIAN ASSISTANT

## 2025-07-28 PROCEDURE — 81001 URINALYSIS AUTO W/SCOPE: CPT

## 2025-07-28 PROCEDURE — 93005 ELECTROCARDIOGRAM TRACING: CPT | Performed by: PHYSICIAN ASSISTANT

## 2025-07-28 PROCEDURE — 84484 ASSAY OF TROPONIN QUANT: CPT

## 2025-07-28 PROCEDURE — 94761 N-INVAS EAR/PLS OXIMETRY MLT: CPT

## 2025-07-28 PROCEDURE — 74177 CT ABD & PELVIS W/CONTRAST: CPT

## 2025-07-28 PROCEDURE — 6360000002 HC RX W HCPCS: Performed by: PHYSICIAN ASSISTANT

## 2025-07-28 PROCEDURE — 99285 EMERGENCY DEPT VISIT HI MDM: CPT

## 2025-07-28 PROCEDURE — 2500000003 HC RX 250 WO HCPCS: Performed by: PHYSICIAN ASSISTANT

## 2025-07-28 PROCEDURE — 1200000000 HC SEMI PRIVATE

## 2025-07-28 RX ORDER — HEPARIN SODIUM 1000 [USP'U]/ML
5000 INJECTION, SOLUTION INTRAVENOUS; SUBCUTANEOUS PRN
Status: DISCONTINUED | OUTPATIENT
Start: 2025-07-28 | End: 2025-08-01

## 2025-07-28 RX ORDER — ROSUVASTATIN CALCIUM 5 MG/1
5 TABLET, COATED ORAL NIGHTLY
Status: DISCONTINUED | OUTPATIENT
Start: 2025-07-28 | End: 2025-08-01 | Stop reason: HOSPADM

## 2025-07-28 RX ORDER — AMLODIPINE BESYLATE 5 MG/1
5 TABLET ORAL DAILY
Status: DISCONTINUED | OUTPATIENT
Start: 2025-07-29 | End: 2025-08-01 | Stop reason: HOSPADM

## 2025-07-28 RX ORDER — LOSARTAN POTASSIUM 100 MG/1
100 TABLET ORAL DAILY
Status: DISCONTINUED | OUTPATIENT
Start: 2025-07-29 | End: 2025-08-01 | Stop reason: HOSPADM

## 2025-07-28 RX ORDER — HEPARIN SODIUM 10000 [USP'U]/100ML
5-30 INJECTION, SOLUTION INTRAVENOUS CONTINUOUS
Status: DISCONTINUED | OUTPATIENT
Start: 2025-07-28 | End: 2025-08-01

## 2025-07-28 RX ORDER — METOPROLOL TARTRATE 25 MG/1
25 TABLET, FILM COATED ORAL 2 TIMES DAILY
Status: DISCONTINUED | OUTPATIENT
Start: 2025-07-28 | End: 2025-08-01 | Stop reason: HOSPADM

## 2025-07-28 RX ORDER — AZELASTINE HYDROCHLORIDE 0.5 MG/ML
1 SOLUTION/ DROPS OPHTHALMIC 2 TIMES DAILY
Status: DISCONTINUED | OUTPATIENT
Start: 2025-07-28 | End: 2025-08-01 | Stop reason: HOSPADM

## 2025-07-28 RX ORDER — IOPAMIDOL 755 MG/ML
75 INJECTION, SOLUTION INTRAVASCULAR
Status: COMPLETED | OUTPATIENT
Start: 2025-07-28 | End: 2025-07-28

## 2025-07-28 RX ORDER — ASPIRIN 81 MG/1
81 TABLET, CHEWABLE ORAL DAILY
Status: DISCONTINUED | OUTPATIENT
Start: 2025-07-29 | End: 2025-08-01 | Stop reason: HOSPADM

## 2025-07-28 RX ORDER — CLOPIDOGREL BISULFATE 75 MG/1
75 TABLET ORAL DAILY
Status: CANCELLED | OUTPATIENT
Start: 2025-07-29

## 2025-07-28 RX ORDER — HEPARIN SODIUM 1000 [USP'U]/ML
10000 INJECTION, SOLUTION INTRAVENOUS; SUBCUTANEOUS PRN
Status: DISCONTINUED | OUTPATIENT
Start: 2025-07-28 | End: 2025-08-01

## 2025-07-28 RX ORDER — HEPARIN SODIUM 1000 [USP'U]/ML
10000 INJECTION, SOLUTION INTRAVENOUS; SUBCUTANEOUS ONCE
Status: COMPLETED | OUTPATIENT
Start: 2025-07-28 | End: 2025-07-28

## 2025-07-28 RX ORDER — POTASSIUM CHLORIDE 1500 MG/1
20 TABLET, EXTENDED RELEASE ORAL DAILY
Status: DISCONTINUED | OUTPATIENT
Start: 2025-07-29 | End: 2025-08-01 | Stop reason: HOSPADM

## 2025-07-28 RX ORDER — HYDROCHLOROTHIAZIDE 25 MG/1
25 TABLET ORAL DAILY
Status: DISCONTINUED | OUTPATIENT
Start: 2025-07-29 | End: 2025-08-01 | Stop reason: HOSPADM

## 2025-07-28 RX ADMIN — HEPARIN SODIUM 14 UNITS/KG/HR: 10000 INJECTION, SOLUTION INTRAVENOUS at 23:55

## 2025-07-28 RX ADMIN — HEPARIN SODIUM 10000 UNITS: 1000 INJECTION INTRAVENOUS; SUBCUTANEOUS at 23:55

## 2025-07-28 RX ADMIN — IOPAMIDOL 75 ML: 755 INJECTION, SOLUTION INTRAVENOUS at 22:07

## 2025-07-28 ASSESSMENT — PAIN DESCRIPTION - DESCRIPTORS: DESCRIPTORS: ACHING

## 2025-07-28 ASSESSMENT — PAIN - FUNCTIONAL ASSESSMENT
PAIN_FUNCTIONAL_ASSESSMENT: ACTIVITIES ARE NOT PREVENTED
PAIN_FUNCTIONAL_ASSESSMENT: 0-10
PAIN_FUNCTIONAL_ASSESSMENT: 0-10

## 2025-07-28 ASSESSMENT — PAIN DESCRIPTION - PAIN TYPE: TYPE: ACUTE PAIN

## 2025-07-28 ASSESSMENT — PAIN SCALES - GENERAL
PAINLEVEL_OUTOF10: 6
PAINLEVEL_OUTOF10: 6

## 2025-07-28 ASSESSMENT — PAIN DESCRIPTION - ORIENTATION: ORIENTATION: RIGHT

## 2025-07-28 ASSESSMENT — PAIN DESCRIPTION - LOCATION: LOCATION: FLANK

## 2025-07-28 NOTE — CARE COORDINATION
Ambulatory Care Coordination Note     2025 3:15 PM     Patient Current Location:  Home: [address] [id number]     This patient was received as a referral from [facility] .    ACM contacted the patient by telephone. Verified name and  with patient as identifiers. Provided introduction to self, and explanation of the ACM role.   Patient declined care management services at this time.          ACM: Tatianna Lang RN     Challenges to be reviewed by the provider   Additional needs identified to be addressed with provider Yes  medications- need refill for Metoprolol.                  Method of communication with provider: chart routing.    Utilization: Initial Call - N/A    Care Summary Note: ACM reach out to the patient for Care Management. Patient concerned his PCP is retiring and he received a letter in the mail but does not know who his new PCP is at this time.  ACM sent a secure E-Mail to  Kesha Sharp asking her to reach out to this patient.    Offered patient enrollment in the Remote Patient Monitoring (RPM) program for in-home monitoring: Deferred at this time because declined ; will discuss at next outreach.     Assessments Completed:   General Assessment              Medications Reviewed:   no    Advance Care Planning:   no     Care Planning:   Not completed during this call    PCP/Specialist follow up:   Future Appointments         Provider Specialty Dept Phone    2025 11:00 AM Juan Manuel Meadows MD Cardiology [phone]    2025 12:20 PM Ratna Ferrer APRN - CNP Cardiac Electrophysiology [phone]    2026 1:00 PM uMriel Shelley APRN - CNP Cardiac Electrophysiology [phone]            Follow Up:   Plan for next ACM outreach in approximately none to complete:  - none.   Patient  is agreeable to this plan.

## 2025-07-28 NOTE — ED PROVIDER NOTES
EMERGENCY DEPARTMENT ENCOUNTER        Pt Name: Hay Boyer  MRN: [id number]  Birthdate 1950  Date of evaluation: 7/28/2025  Provider: Darlene Guy PA-C  PCP: Rivas Beltran MD    RUSTAM. I have evaluated this patient.        Triage CHIEF COMPLAINT       Chief Complaint   Patient presents with    Flank Pain     And coughing up bight red blood. Started this afternoon. States his throat feels sore.          HISTORY OF PRESENT ILLNESS      Chief Complaint: Right flank pain, hemoptysis    Hay Boyer is a 74 y.o. male who presents for right flank pain and hemoptysis.  Patient states he has had a pain in his right side/flank since this morning.  He thinks it is just from sleeping on different beds this past week while on vacation.  Shortly before arrival, he coughed and brought up some bright red blood.  He says his throat does feel sore/hoarse.  He denies SOB.  Denies fever or chills.  Denies leg pain or swelling.  Denies dysuria, hematuria, frequency, urgency.  They did drive to and from the [place], [place].  He is on Plavix.       Nursing Notes were all reviewed and agreed with or any disagreements were addressed in the HPI.    REVIEW OF SYSTEMS     CONSTITUTIONAL:  Denies fever.  EYES:  Denies visual changes.  HEAD:  Denies headache.  ENT:  Denies earache, nasal congestion, sore throat.  NECK:  Denies neck pain.  RESPIRATORY:  Denies any shortness of breath.  CARDIOVASCULAR:  Denies chest pain.  GI:  Denies nausea or vomiting.    :  Denies urinary symptoms.  + flank pain.  MUSCULOSKELETAL:  Denies extremity pain or swelling.  BACK:  Denies back pain.  INTEGUMENT:  Denies skin changes.  LYMPHATIC:  Denies lymphadenopathy.  NEUROLOGIC:  Denies any numbness/tingling.  PSYCHIATRIC:  Denies SI/HI.    PAST MEDICAL HISTORY     Past Medical History:   Diagnosis Date    Allergic rhinitis 05/24/2019    Atrial fibrillation (HCC)     Atrial fibrillation (HCC)     Cancer (HCC)     skin    Cardiomyopathy

## 2025-07-29 ENCOUNTER — APPOINTMENT (OUTPATIENT)
Dept: NON INVASIVE DIAGNOSTICS | Age: 75
End: 2025-07-29
Attending: STUDENT IN AN ORGANIZED HEALTH CARE EDUCATION/TRAINING PROGRAM
Payer: MEDICARE

## 2025-07-29 LAB
ALBUMIN SERPL-MCNC: 3.8 G/DL (ref 3.4–5)
ALBUMIN/GLOB SERPL: 1.1 {RATIO} (ref 1.1–2.2)
ALP SERPL-CCNC: 69 U/L (ref 40–129)
ALT SERPL-CCNC: 9 U/L (ref 10–40)
ANION GAP SERPL CALCULATED.3IONS-SCNC: 13 MMOL/L (ref 9–17)
ANTI-XA UNFRAC HEPARIN: 0.35 IU/L
ANTI-XA UNFRAC HEPARIN: 0.36 IU/L
ANTI-XA UNFRAC HEPARIN: 0.77 IU/L
ANTI-XA UNFRAC HEPARIN: <0.1 IU/L
AST SERPL-CCNC: 18 U/L (ref 15–37)
BASOPHILS # BLD: 0.04 K/UL
BASOPHILS NFR BLD: 0 % (ref 0–1)
BILIRUB SERPL-MCNC: 2 MG/DL (ref 0–1)
BNP SERPL-MCNC: 881 PG/ML (ref 0–125)
BUN SERPL-MCNC: 19 MG/DL (ref 7–20)
CALCIUM SERPL-MCNC: 9.2 MG/DL (ref 8.3–10.6)
CHLORIDE SERPL-SCNC: 97 MMOL/L (ref 99–110)
CO2 SERPL-SCNC: 25 MMOL/L (ref 21–32)
CREAT SERPL-MCNC: 0.9 MG/DL (ref 0.8–1.3)
ECHO AO ASC DIAM: 4.2 CM
ECHO AO ASCENDING AORTA INDEX: 1.56 CM/M2
ECHO AO ROOT DIAM: 3.9 CM
ECHO AO ROOT INDEX: 1.44 CM/M2
ECHO AR MAX VEL PISA: 4.2 M/S
ECHO AV AREA PEAK VELOCITY: 4.4 CM2
ECHO AV AREA VTI: 3.7 CM2
ECHO AV AREA/BSA PEAK VELOCITY: 1.6 CM2/M2
ECHO AV AREA/BSA VTI: 1.4 CM2/M2
ECHO AV MEAN GRADIENT: 6 MMHG
ECHO AV MEAN VELOCITY: 1.2 M/S
ECHO AV PEAK GRADIENT: 13 MMHG
ECHO AV PEAK VELOCITY: 1.8 M/S
ECHO AV REGURGITANT PHT: 637 MS
ECHO AV VELOCITY RATIO: 0.94
ECHO AV VTI: 34.5 CM
ECHO BSA: 2.79 M2
ECHO EST RA PRESSURE: 3 MMHG
ECHO LA AREA 4C: 60.8 CM2
ECHO LA DIAMETER INDEX: 2.37 CM/M2
ECHO LA DIAMETER: 6.4 CM
ECHO LA MAJOR AXIS: 10.4 CM
ECHO LA TO AORTIC ROOT RATIO: 1.64
ECHO LA VOL MOD A4C: 273 ML (ref 18–58)
ECHO LA VOLUME INDEX MOD A4C: 101 ML/M2 (ref 16–34)
ECHO LV E' LATERAL VELOCITY: 7.3 CM/S
ECHO LV E' SEPTAL VELOCITY: 4.6 CM/S
ECHO LV EDV A4C: 136 ML
ECHO LV EDV INDEX A4C: 50 ML/M2
ECHO LV EF PHYSICIAN: 55 %
ECHO LV EJECTION FRACTION A4C: 70 %
ECHO LV ESV A4C: 42 ML
ECHO LV ESV INDEX A4C: 16 ML/M2
ECHO LV FRACTIONAL SHORTENING: 39 % (ref 28–44)
ECHO LV INTERNAL DIMENSION DIASTOLE INDEX: 1.81 CM/M2
ECHO LV INTERNAL DIMENSION DIASTOLIC: 4.9 CM (ref 4.2–5.9)
ECHO LV INTERNAL DIMENSION SYSTOLIC INDEX: 1.11 CM/M2
ECHO LV INTERNAL DIMENSION SYSTOLIC: 3 CM
ECHO LV IVSD: 1.2 CM (ref 0.6–1)
ECHO LV MASS 2D: 226.4 G (ref 88–224)
ECHO LV MASS INDEX 2D: 83.8 G/M2 (ref 49–115)
ECHO LV POSTERIOR WALL DIASTOLIC: 1.2 CM (ref 0.6–1)
ECHO LV RELATIVE WALL THICKNESS RATIO: 0.49
ECHO LVOT AREA: 4.5 CM2
ECHO LVOT AV VTI INDEX: 0.82
ECHO LVOT DIAM: 2.4 CM
ECHO LVOT MEAN GRADIENT: 6 MMHG
ECHO LVOT PEAK GRADIENT: 12 MMHG
ECHO LVOT PEAK VELOCITY: 1.7 M/S
ECHO LVOT STROKE VOLUME INDEX: 47.4 ML/M2
ECHO LVOT SV: 128 ML
ECHO LVOT VTI: 28.3 CM
ECHO MV A VELOCITY: 0.37 M/S
ECHO MV E VELOCITY: 1.43 M/S
ECHO MV E/A RATIO: 3.86
ECHO MV E/E' LATERAL: 19.59
ECHO MV E/E' RATIO (AVERAGED): 25.34
ECHO MV E/E' SEPTAL: 31.09
ECHO RIGHT VENTRICULAR SYSTOLIC PRESSURE (RVSP): 30 MMHG
ECHO RV FREE WALL PEAK S': 13.7 CM/S
ECHO RV MID DIMENSION: 4.7 CM
ECHO TV REGURGITANT MAX VELOCITY: 2.58 M/S
ECHO TV REGURGITANT PEAK GRADIENT: 27 MMHG
EKG DIAGNOSIS: NORMAL
EKG DIAGNOSIS: NORMAL
EKG Q-T INTERVAL: 398 MS
EKG Q-T INTERVAL: 408 MS
EKG QRS DURATION: 112 MS
EKG QRS DURATION: 116 MS
EKG QTC CALCULATION (BAZETT): 459 MS
EKG QTC CALCULATION (BAZETT): 467 MS
EKG R AXIS: -40 DEGREES
EKG R AXIS: -51 DEGREES
EKG T AXIS: 22 DEGREES
EKG T AXIS: 65 DEGREES
EKG VENTRICULAR RATE: 76 BPM
EKG VENTRICULAR RATE: 83 BPM
EOSINOPHIL # BLD: 0.1 K/UL
EOSINOPHILS RELATIVE PERCENT: 1 % (ref 0–3)
ERYTHROCYTE [DISTWIDTH] IN BLOOD BY AUTOMATED COUNT: 14.3 % (ref 11.7–14.9)
ERYTHROCYTE [DISTWIDTH] IN BLOOD BY AUTOMATED COUNT: 14.3 % (ref 11.7–14.9)
GFR, ESTIMATED: 79 ML/MIN/1.73M2
GLUCOSE SERPL-MCNC: 158 MG/DL (ref 74–99)
HCT VFR BLD AUTO: 44.8 % (ref 42–52)
HCT VFR BLD AUTO: 47.7 % (ref 42–52)
HGB BLD-MCNC: 15 G/DL (ref 13.5–18)
HGB BLD-MCNC: 15.6 G/DL (ref 13.5–18)
IMM GRANULOCYTES # BLD AUTO: 0.06 K/UL
IMM GRANULOCYTES NFR BLD: 1 %
INR PPP: 1.2
LYMPHOCYTES NFR BLD: 2.22 K/UL
LYMPHOCYTES RELATIVE PERCENT: 18 % (ref 24–44)
MCH RBC QN AUTO: 29.2 PG (ref 27–31)
MCH RBC QN AUTO: 29.8 PG (ref 27–31)
MCHC RBC AUTO-ENTMCNC: 32.7 G/DL (ref 32–36)
MCHC RBC AUTO-ENTMCNC: 33.5 G/DL (ref 32–36)
MCV RBC AUTO: 89.1 FL (ref 78–100)
MCV RBC AUTO: 89.2 FL (ref 78–100)
MONOCYTES NFR BLD: 1.35 K/UL
MONOCYTES NFR BLD: 11 % (ref 0–5)
NEUTROPHILS NFR BLD: 70 % (ref 36–66)
NEUTS SEG NFR BLD: 8.84 K/UL
PARTIAL THROMBOPLASTIN TIME: 26 SEC (ref 25.1–37.1)
PLATELET # BLD AUTO: 214 K/UL (ref 140–440)
PLATELET # BLD AUTO: 230 K/UL (ref 140–440)
PMV BLD AUTO: 9.9 FL (ref 7.5–11.1)
PMV BLD AUTO: 9.9 FL (ref 7.5–11.1)
POTASSIUM SERPL-SCNC: 3.9 MMOL/L (ref 3.5–5.1)
PROT SERPL-MCNC: 7.2 G/DL (ref 6.4–8.2)
PROTHROMBIN TIME: 15.5 SEC (ref 11.7–14.5)
RBC # BLD AUTO: 5.03 M/UL (ref 4.6–6.2)
RBC # BLD AUTO: 5.35 M/UL (ref 4.6–6.2)
SODIUM SERPL-SCNC: 136 MMOL/L (ref 136–145)
TROPONIN I SERPL HS-MCNC: 23 NG/L (ref 0–22)
TROPONIN I SERPL HS-MCNC: 23 NG/L (ref 0–22)
WBC OTHER # BLD: 12.1 K/UL (ref 4–10.5)
WBC OTHER # BLD: 12.6 K/UL (ref 4–10.5)

## 2025-07-29 PROCEDURE — 6370000000 HC RX 637 (ALT 250 FOR IP): Performed by: STUDENT IN AN ORGANIZED HEALTH CARE EDUCATION/TRAINING PROGRAM

## 2025-07-29 PROCEDURE — 85730 THROMBOPLASTIN TIME PARTIAL: CPT

## 2025-07-29 PROCEDURE — 93306 TTE W/DOPPLER COMPLETE: CPT

## 2025-07-29 PROCEDURE — 99223 1ST HOSP IP/OBS HIGH 75: CPT | Performed by: INTERNAL MEDICINE

## 2025-07-29 PROCEDURE — 36415 COLL VENOUS BLD VENIPUNCTURE: CPT

## 2025-07-29 PROCEDURE — 2500000003 HC RX 250 WO HCPCS: Performed by: STUDENT IN AN ORGANIZED HEALTH CARE EDUCATION/TRAINING PROGRAM

## 2025-07-29 PROCEDURE — 93005 ELECTROCARDIOGRAM TRACING: CPT | Performed by: STUDENT IN AN ORGANIZED HEALTH CARE EDUCATION/TRAINING PROGRAM

## 2025-07-29 PROCEDURE — 83880 ASSAY OF NATRIURETIC PEPTIDE: CPT

## 2025-07-29 PROCEDURE — 85025 COMPLETE CBC W/AUTO DIFF WBC: CPT

## 2025-07-29 PROCEDURE — 85610 PROTHROMBIN TIME: CPT

## 2025-07-29 PROCEDURE — 2500000003 HC RX 250 WO HCPCS: Performed by: PHYSICIAN ASSISTANT

## 2025-07-29 PROCEDURE — 80053 COMPREHEN METABOLIC PANEL: CPT

## 2025-07-29 PROCEDURE — 6370000000 HC RX 637 (ALT 250 FOR IP)

## 2025-07-29 PROCEDURE — 84484 ASSAY OF TROPONIN QUANT: CPT

## 2025-07-29 PROCEDURE — 94761 N-INVAS EAR/PLS OXIMETRY MLT: CPT

## 2025-07-29 PROCEDURE — 85520 HEPARIN ASSAY: CPT

## 2025-07-29 PROCEDURE — 1200000000 HC SEMI PRIVATE

## 2025-07-29 PROCEDURE — 93306 TTE W/DOPPLER COMPLETE: CPT | Performed by: INTERNAL MEDICINE

## 2025-07-29 PROCEDURE — 2580000003 HC RX 258: Performed by: STUDENT IN AN ORGANIZED HEALTH CARE EDUCATION/TRAINING PROGRAM

## 2025-07-29 PROCEDURE — 85027 COMPLETE CBC AUTOMATED: CPT

## 2025-07-29 PROCEDURE — 93010 ELECTROCARDIOGRAM REPORT: CPT | Performed by: INTERNAL MEDICINE

## 2025-07-29 RX ORDER — SODIUM CHLORIDE 0.9 % (FLUSH) 0.9 %
5-40 SYRINGE (ML) INJECTION PRN
Status: DISCONTINUED | OUTPATIENT
Start: 2025-07-29 | End: 2025-08-01 | Stop reason: HOSPADM

## 2025-07-29 RX ORDER — MAGNESIUM SULFATE IN WATER 40 MG/ML
2000 INJECTION, SOLUTION INTRAVENOUS PRN
Status: DISCONTINUED | OUTPATIENT
Start: 2025-07-29 | End: 2025-08-01 | Stop reason: HOSPADM

## 2025-07-29 RX ORDER — SODIUM CHLORIDE 9 MG/ML
INJECTION, SOLUTION INTRAVENOUS PRN
Status: DISCONTINUED | OUTPATIENT
Start: 2025-07-29 | End: 2025-08-01 | Stop reason: HOSPADM

## 2025-07-29 RX ORDER — SODIUM CHLORIDE, SODIUM LACTATE, POTASSIUM CHLORIDE, CALCIUM CHLORIDE 600; 310; 30; 20 MG/100ML; MG/100ML; MG/100ML; MG/100ML
INJECTION, SOLUTION INTRAVENOUS CONTINUOUS
Status: DISPENSED | OUTPATIENT
Start: 2025-07-29 | End: 2025-07-29

## 2025-07-29 RX ORDER — ACETAMINOPHEN 325 MG/1
650 TABLET ORAL EVERY 6 HOURS PRN
Status: DISCONTINUED | OUTPATIENT
Start: 2025-07-29 | End: 2025-07-29

## 2025-07-29 RX ORDER — OXYCODONE AND ACETAMINOPHEN 5; 325 MG/1; MG/1
1 TABLET ORAL EVERY 4 HOURS PRN
Refills: 0 | Status: DISCONTINUED | OUTPATIENT
Start: 2025-07-29 | End: 2025-08-01 | Stop reason: HOSPADM

## 2025-07-29 RX ORDER — SODIUM CHLORIDE 0.9 % (FLUSH) 0.9 %
5-40 SYRINGE (ML) INJECTION EVERY 12 HOURS SCHEDULED
Status: DISCONTINUED | OUTPATIENT
Start: 2025-07-29 | End: 2025-08-01 | Stop reason: HOSPADM

## 2025-07-29 RX ORDER — ONDANSETRON 2 MG/ML
4 INJECTION INTRAMUSCULAR; INTRAVENOUS EVERY 6 HOURS PRN
Status: DISCONTINUED | OUTPATIENT
Start: 2025-07-29 | End: 2025-08-01 | Stop reason: HOSPADM

## 2025-07-29 RX ORDER — ONDANSETRON 4 MG/1
4 TABLET, ORALLY DISINTEGRATING ORAL EVERY 8 HOURS PRN
Status: DISCONTINUED | OUTPATIENT
Start: 2025-07-29 | End: 2025-08-01 | Stop reason: HOSPADM

## 2025-07-29 RX ORDER — POLYETHYLENE GLYCOL 3350 17 G/17G
17 POWDER, FOR SOLUTION ORAL DAILY PRN
Status: DISCONTINUED | OUTPATIENT
Start: 2025-07-29 | End: 2025-08-01 | Stop reason: HOSPADM

## 2025-07-29 RX ORDER — ACETAMINOPHEN 650 MG/1
650 SUPPOSITORY RECTAL EVERY 6 HOURS PRN
Status: DISCONTINUED | OUTPATIENT
Start: 2025-07-29 | End: 2025-07-29

## 2025-07-29 RX ORDER — WARFARIN SODIUM 2 MG/1
4 TABLET ORAL
Status: COMPLETED | OUTPATIENT
Start: 2025-07-29 | End: 2025-07-29

## 2025-07-29 RX ORDER — POTASSIUM CHLORIDE 7.45 MG/ML
10 INJECTION INTRAVENOUS PRN
Status: DISCONTINUED | OUTPATIENT
Start: 2025-07-29 | End: 2025-08-01 | Stop reason: HOSPADM

## 2025-07-29 RX ADMIN — SODIUM CHLORIDE, PRESERVATIVE FREE 10 ML: 5 INJECTION INTRAVENOUS at 21:04

## 2025-07-29 RX ADMIN — METOPROLOL TARTRATE 25 MG: 25 TABLET, FILM COATED ORAL at 09:55

## 2025-07-29 RX ADMIN — HYDROCHLOROTHIAZIDE 25 MG: 25 TABLET ORAL at 09:56

## 2025-07-29 RX ADMIN — AMLODIPINE BESYLATE 5 MG: 5 TABLET ORAL at 09:55

## 2025-07-29 RX ADMIN — ROSUVASTATIN CALCIUM 5 MG: 5 TABLET, FILM COATED ORAL at 01:43

## 2025-07-29 RX ADMIN — ASPIRIN 81 MG: 81 TABLET, CHEWABLE ORAL at 09:55

## 2025-07-29 RX ADMIN — METOPROLOL TARTRATE 25 MG: 25 TABLET, FILM COATED ORAL at 21:03

## 2025-07-29 RX ADMIN — HEPARIN SODIUM 14 UNITS/KG/HR: 10000 INJECTION, SOLUTION INTRAVENOUS at 12:44

## 2025-07-29 RX ADMIN — WARFARIN SODIUM 4 MG: 2 TABLET ORAL at 17:42

## 2025-07-29 RX ADMIN — POTASSIUM CHLORIDE 20 MEQ: 1500 TABLET, EXTENDED RELEASE ORAL at 09:55

## 2025-07-29 RX ADMIN — METOPROLOL TARTRATE 25 MG: 25 TABLET, FILM COATED ORAL at 01:43

## 2025-07-29 RX ADMIN — LOSARTAN POTASSIUM 100 MG: 100 TABLET, FILM COATED ORAL at 09:55

## 2025-07-29 RX ADMIN — SODIUM CHLORIDE, SODIUM LACTATE, POTASSIUM CHLORIDE, AND CALCIUM CHLORIDE: .6; .31; .03; .02 INJECTION, SOLUTION INTRAVENOUS at 06:26

## 2025-07-29 ASSESSMENT — PAIN DESCRIPTION - DESCRIPTORS
DESCRIPTORS: JABBING;SHARP
DESCRIPTORS: SHARP

## 2025-07-29 ASSESSMENT — PAIN SCALES - GENERAL
PAINLEVEL_OUTOF10: 6
PAINLEVEL_OUTOF10: 6

## 2025-07-29 ASSESSMENT — PAIN DESCRIPTION - ORIENTATION
ORIENTATION: RIGHT
ORIENTATION: RIGHT

## 2025-07-29 ASSESSMENT — PAIN DESCRIPTION - LOCATION
LOCATION: FLANK
LOCATION: ABDOMEN

## 2025-07-29 ASSESSMENT — PAIN - FUNCTIONAL ASSESSMENT: PAIN_FUNCTIONAL_ASSESSMENT: ACTIVITIES ARE NOT PREVENTED

## 2025-07-29 ASSESSMENT — PAIN DESCRIPTION - PAIN TYPE: TYPE: ACUTE PAIN

## 2025-07-29 NOTE — CARE COORDINATION
07/29/25 0858   Service Assessment   Patient Orientation Alert and Oriented   Cognition Alert   History Provided By Patient;Medical Record   Primary Caregiver Self   Support Systems Spouse/Significant Other;Family Members   Patient's Healthcare Decision Maker is: Legal Next of Kin   PCP Verified by CM Yes   Last Visit to PCP Within last 6 months   Prior Functional Level Independent in ADLs/IADLs   Current Functional Level Independent in ADLs/IADLs   Can patient return to prior living arrangement Yes   Ability to make needs known: Good   Family able to assist with home care needs: Yes   Would you like for me to discuss the discharge plan with any other family members/significant others, and if so, who? Yes   Financial Resources Medicare   Community Resources None     Discharge planning initiated. Patient is from home with wife in 1 story home. Home has an entry ramp. Patient independent prior to admission. Patient has PCP and insurance. Patient has walk in shower and walker at home. Patient has reliable transportation to appointments. No HHC at this time. Has had [facility] In the past. No needs at this time. CM following for any new needs that may arise.

## 2025-07-29 NOTE — CARE COORDINATION
ACM reached out to the patient spouse Elizabeth HIPAA verified,  ACM advise Elizabeth that the PCP called in his refill request for Metoprolol, and the  Kesha Sharp will be reaching out to them in reference to who the new PCP will be after their current PCP retires.  Patient spouse ARELI.

## 2025-07-29 NOTE — PROGRESS NOTES
4 Eyes Skin Assessment     NAME:  Hay Boyer  YOB: 1950  MEDICAL RECORD NUMBER:  [id number]    The patient is being assessed for  Admission    I agree that at least one RN has performed a thorough Head to Toe Skin Assessment on the patient. ALL assessment sites listed below have been assessed.      Areas assessed by both nurses:    Head, Face, Ears, Shoulders, Back, Chest, Arms, Elbows, Hands, Sacrum. Buttock, Coccyx, Ischium, Legs. Feet and Heels, and Under Medical Devices         Does the Patient have a Wound? No noted wound(s)       Oz Prevention initiated by RN: No  Wound Care Orders initiated by RN: No    For hospital-acquired stage 1 & 2 and ALL Stage 3,4, Unstageable, DTI, NWPT, and Complex wounds: place order “IP Wound Care/Ostomy Nurse Eval and Treat” by RN under : NA    New Ostomies, if present place, Ostomy referral order under : No     Nurse 1 eSignature: Electronically signed by Maggy Andre RN on 7/29/25 at 2:38 AM EDT    **SHARE this note so that the co-signing nurse can place an eSignature**    Nurse 2 eSignature: Electronically signed by Eladia Michelle LPN on 7/29/25 at 2:45 AM EDT eyes

## 2025-07-29 NOTE — PROGRESS NOTES
Patient had been previously maintained on Coumadin.    We will restart Coumadin.  Pharmacy to dose.    Will continue aspirin.  Discontinue Plavix.    Discussed with CARLOS.    Eric Amaya PA-C 07/29/25 3:27 PM

## 2025-07-29 NOTE — CONSULTS
PHARMACY ANTICOAGULATION MONITORING SERVICE    Hay Boyer is a 74 y.o. male on warfarin therapy. Pharmacy consulted by DOMO Little for monitoring and adjustment of treatment.    Indication for anticoagulation: Afib  INR goal: 2.0 - 3.0  Warfarin dose prior to admission: Tuesday through Sun Warfarin 2.5mg daily.  Hold warfarin on Mondays.    Pertinent Laboratory Values   Recent Labs     07/28/25  1924 07/29/25  0000 07/29/25  0110   INR  --  1.2  --    HGB 15.1 15.6 15.0   HCT 45.0 47.7 44.8    230 214     Recent Warfarin doses given this admission...  Date INR Result Warfarin Dose Given   7/29 1.2 4 mg (ordered)                    Assessment/Plan:  Drug Interactions:   Home meds: Rosuvastatin may increase the anticoagulant effects of warfarin. Patient has been on both medications previous to this admission.  No significant drug interactions noted.  INR 1.2  Patient home regimen is warfarin 2.5mg daily Tuesday through Sunday, hold warfarin on Mondays.  Give warfarin 4mg po tonight and follow INR trends tomorrow.   Pharmacy will continue to monitor and adjust warfarin therapy as indicated    Thank you for the consult.  Sergey Vincent RP, PharmD  7/29/2025 5:05 PM

## 2025-07-29 NOTE — PROGRESS NOTES
V2.0  [facility] Hospitalist Progress Note      Name:  Hay Boyer /Age/Sex: 1950  (74 y.o. male)   MRN & CSN:  [id number] & [id number] Encounter Date/Time: 2025 9:28 AM EDT    Location:  [address] [place], [place] [address] PCP: Rivas Beltran MD       Hospital Day: 2    Assessment and Plan:   Hay Boyer is a 74 y.o. male who presents with Acute pulmonary embolism without acute cor pulmonale, unspecified pulmonary embolism type (HCC)    Assessment and Plan:  Provoked pulmonary embolism to the right lung with mild troponin elevation.  Symptoms include pleuritic chest pain with shortness of breath and minimal hemoptysis.  Symptoms are associated with a recent long travel from [place].  Watchman device in place on Plavix after Watchman completed 45 days recently post watchman with warfarin.  Will discuss with cardiology after echocardiogram when to start warfarin as this is the patient's preference.  We will try to do warfarin bridging with heparin when INR is above 2 will likely plan for discharge.  Daily INRs to be checked.  Currently on heparin drip.  Telemetry in place  Persistent atrial fibrillation s/p watchman on 2025 okay to hold off on Plavix while on heparin.  Underlying history of bleeding  Benign essential hypertension on Norvasc losartan hydrochlorothiazide and Lopressor  Hyperlipidemia on Crestor  SEDRICK on CPAP at home  Morbid obesity recommend lifestyle modifications    Medical Decision Making:  The following items were considered in medical decision making:  Discussion of patient care with other providers  Reviewed clinical lab tests if any  Reviewed radiology tests if any  Reviewed other diagnostic tests/interventions  Independent review of radiologic images if any  Microbiology cultures and other micro tests if any    Estimated time spent for medical decision-making encompassing complexity of the case, history taking, medication review, physical examination, communication with family, RN,  CONTRAST  Result Date: 7/28/2025  CTA PULMONARY W CONTRAST cough, hemoptysis CT radiation dose optimization techniques (automated exposure control, and use of iterative reconstruction techniques, or adjustment of the mA  and/or kV according to patient size) were used to limit patient radiation dose. Technique:  Axial and coronal images were performed. IOPAMIDOL 76 % IV SOLN 75mL Comparison: None. Findings:  Mediastinum: Heart and pericardium are unremarkable.         Normal caliber aorta. There is an occlusion device in the left atrial appendage. Thyroid lobes are bilaterally symmetric. There is a filling defect in the right lower lobe pulmonary artery branches and in the lingular branches indicating pulmonary emboli. Calcification along the aortic valve is noted. Lymph nodes: No mediastinal, hilar nor axillary adenopathy are seen. Lungs: Moderate infiltrate in the right lower lobe. Mild increased interstitial No pulmonary nodules.  No tracheobronchial masses are seen.  No pleural effusion  is seen. Musculoskeletal: No compression vertebral body deformities are seen.  No destructive bony changes are seen.  Upper abdominal images are unremarkable. IMPRESSION:  Right lower lobe and lingular branch pulmonary emboli. Pulmonary infiltrates more so at the right than left lung base. Left atrial appendage occlusion device. Findings were called to Dr. Guy by telephone on 7/28/2025 at 10:27 PM. If there is persistent clinical concern, appropriate additional investigations maybe performed. This dictation was created with voice recognition software. Although every effort is made to review the dictation as it is transcribed, on occasion spoken words, phrases, names, numbers, punctations etc. can be misinterpreted by the technology leading to omissions or inappropriate outcomes.  Dictated and Electronically Signed By: Daniel Bradley MD [facility] 7/28/2025 22:27          Electronically signed by Kizzy

## 2025-07-29 NOTE — PROGRESS NOTES
ED TO INPATIENT SBAR HANDOFF    Patient Name: Hay Boyer   :  1950  74 y.o.   Preferred Name  Hay  Family/Caregiver Present no   Restraints no   C-SSRS: Risk of Suicide: No Risk  Sitter no   Sepsis Risk Score Sepsis V2 Risk Score: 13.4    PLEASE NOTE--Encounter / Re-Admission Within 30 Days  This patient has had another encounter or admission within the last 30 days.      Readmission Risk Score: 11.7      Situation  Chief Complaint   Patient presents with    Flank Pain     And coughing up bight red blood. Started this afternoon. States his throat feels sore.      Brief Description of Patient's Condition: Pt presented with flank pain and coughing up blood- CT and CTA performed, RLL infiltrate/PE  Mental Status: oriented, alert, coherent, logical, thought processes intact, and able to concentrate and follow conversation  Arrived from: home    Imaging:   CTA PULMONARY W CONTRAST   Final Result      CT ABDOMEN PELVIS W IV CONTRAST Additional Contrast? None   Final Result        Abnormal labs:   Abnormal Labs Reviewed   URINALYSIS WITH REFLEX TO CULTURE - Abnormal; Notable for the following components:       Result Value    Specific Gravity, UA >1.030 (*)     Urine Hgb MODERATE (*)     Protein, UA TRACE (*)     All other components within normal limits   CBC WITH AUTO DIFFERENTIAL - Abnormal; Notable for the following components:    WBC 11.8 (*)     Lymphocytes % 23 (*)     Monocytes % 11 (*)     All other components within normal limits   COMPREHENSIVE METABOLIC PANEL - Abnormal; Notable for the following components:    Glucose 144 (*)     Total Bilirubin 1.1 (*)     All other components within normal limits   TROPONIN - Abnormal; Notable for the following components:    Troponin, High Sensitivity 24 (*)     All other components within normal limits   MICROSCOPIC URINALYSIS - Abnormal; Notable for the following components:    Mucus, UA OCCASIONAL (*)     Other Observations UA Culture not indicated. (*)

## 2025-07-29 NOTE — CONSULTS
Name:  Hay Boyer /Age/Sex: 1950  (74 y.o. male)   MRN & CSN:  [id number] & [id number] Admission Date/Time: 2025  7:08 PM   Location:  1107/1107-A PCP: Rivas Beltran MD       Hospital Day: 2          Referring physician:  José Miguel Aguero MD         Reason for consultation: Pulmonary embolism            Thanks for referral.    Information source: Patient    CC;          HPI:   Thank you for involving me in taking  care of Hay Boyer who  is a 74 y.o.year  Old male  Presents with history of PAF had been having issues with the anticoagulation was on Coumadin had hematuria and had falls and has been off anticoagulation was watchman placed in May 2025, hypertension, hyperlipidemia, had provoked PE now with submassive pulmonary embolism of the right lung given that he had been traveling to [place] now presents with right-sided chest pain and shortness of breath CTA was positive                     Past medical history:    has a past medical history of Allergic rhinitis, Atrial fibrillation (HCC), Atrial fibrillation (HCC), Cancer (HCC), Cardiomyopathy (HCC), Hilar lymphadenopathy, History of left heart catheterization (LHC), Hyperlipidemia, Hypertension, Impotence, Obstructive sleep apnea syndrome, Peripheral venous insufficiency, and Sleep apnea.  Past surgical history:   has a past surgical history that includes Cardioversion; Knee arthroscopy; Appendectomy; Cardiac procedure (N/A, 2025); left atrial appendage occluder device (N/A, 2025); and ep device procedure (N/A, 2025).  Social History:   reports that he has never smoked. He has never used smokeless tobacco. He reports that he does not drink alcohol and does not use drugs.  Family history:  family history includes Arthritis in his mother; Cancer in his brother; Clotting Disorder in his father; Dementia in his mother; Diabetes in his brother and sister; Gait Disorder in his brother; High Blood

## 2025-07-29 NOTE — PLAN OF CARE
Problem: Chronic Conditions and Co-morbidities  Goal: Patient's chronic conditions and co-morbidity symptoms are monitored and maintained or improved  7/29/2025 1009 by Kristina Godinez RN  Outcome: Progressing  7/29/2025 0057 by Eladia Michelle LPN  Outcome: Progressing     Problem: Discharge Planning  Goal: Discharge to home or other facility with appropriate resources  7/29/2025 1009 by Kristina Godinez RN  Outcome: Progressing  7/29/2025 0057 by Eladia Michelle LPN  Outcome: Progressing     Problem: Pain  Goal: Verbalizes/displays adequate comfort level or baseline comfort level  Outcome: Progressing     Problem: Safety - Adult  Goal: Free from fall injury  Outcome: Progressing

## 2025-07-29 NOTE — H&P
History and Physical      Name:  Hay Boyer /Age/Sex: 1950  (74 y.o. male)   MRN & CSN:  [id number] & [id number] Encounter Date/Time: 2025 11:09 PM   Location:  ED05/ED-05 PCP: Rivas Beltran MD       Hospital Day: 1    Assessment and Plan:     Patient is a 74 y.o. male who presented with SOB.     # Acute provoked submassive pulmonary embolism of right lung  # Troponin elevation  - Reported worsening pleuritic chest pain and SOB with minimal hemoptysis (~ single tablespoon) over past day, no LE swelling, presyncope, syncope, orthopnea, PND or LE edema. No previous VTE. Had recent long drive to [place].    - HDS, remains on RA, initial Tn mildly positive. ECG without acute ischemic changes. CTPA showed right lower lobe and lingular pulmonary artery branches without evidence of right heart strain.  - PESI 85, started on heparin gtt in ED, cardiology consulted, repeat Tn, telemetry, TTE ordered.     # Persistent atrial fibrillation s/p watchman on 2025   - Continue ASA and Lopressor, ok to hold off Plavix while on heparin gtt.     # Essential hypertension  - Continue Norvasc, losartan, HCTZ and Lopressor with holding parameters.     # Mixed hyperlipidemia  - Continue Crestor.    # SEDRICK  - Continue CPAP qhs.     # Class III obesity  - BMI 40.6.    Checklist:  Advanced care planning: full  Diet: NPO past midnight pending cardiology evaluation  VTE ppx: heparin      Disposition: admit to inpatient.  Estimated discharge: 2-3 day(s).  Current living situation: home.  Expected disposition: home.    Spoke with ED provider who recommended admission for the patient and I agree with that plan.  Personally reviewed lab studies and imaging.  Imaging that was interpreted personally and results as stated above.    History of Present Illness:     Chief Complaint: shortness of breath    Patient is a 74 y.o. male with a PMHx as above who presented to the ED with worsening pleuritic chest pain and  SOB with minimal hemoptysis (~ single tablespoon) over past day, no LE swelling, presyncope, syncope, orthopnea, PND or LE edema. No previous VTE. Had recent long drive to [place]. Denied any fevers, chills, CP, N/V, abdominal pain, C/D or urinary changes. Denied any tobacco or alcohol use.    History obtained from: patient, wife and ED provider.    ROS:     Pertinent positives and negatives discussed in HPI above.    Objective:     No intake or output data in the 24 hours ending 07/28/25 2309     Vitals:   Vitals:    07/28/25 1941 07/28/25 2002 07/28/25 2032 07/28/25 2103   BP: (!) 143/87 (!) 147/97 (!) 144/100 126/89   Pulse: 93 75 85 76   Resp: 23 19 14 19   Temp:       TempSrc:       SpO2: 94% 94% 96% 96%   Weight:       Height:         BMI: Body mass index is 40.62 kg/m².  General: Awake, obese.    HEENT: PERRLA. Vision grossly intact. Normal hearing. Oropharynx clear.  Neck: Supple. No JVD.   CV: RRR. NL S1/S2. No BLE pitting edema.   Pulm: Increased effort on RA. CTAB.   GI: +BS x4. Soft. NT/ND.  : No CVA tenderness. No Renner catheter.  Skin: Intact, warm and dry.  MSK: No gross joint deformities. Full ROM.   Neuro: AAOx3. CNs grossly intact. Normal speech. No focal deficit.   Psych: Calm.    Past History:     PMHx:   Past Medical History:   Diagnosis Date    Allergic rhinitis 05/24/2019    Atrial fibrillation (HCC)     Atrial fibrillation (HCC)     Cancer (HCC)     skin    Cardiomyopathy (HCC) 05/24/2019    Hilar lymphadenopathy 05/24/2019    calcified    History of left heart catheterization (LHC) 02/06/2025    Hyperlipidemia 05/24/2019    Hypertension     Impotence 05/24/2019    Obstructive sleep apnea syndrome 05/24/2019    Peripheral venous insufficiency 05/24/2019    Sleep apnea        PSHx:   Past Surgical History:   Procedure Laterality Date    APPENDECTOMY      CARDIAC PROCEDURE N/A 02/06/2025    Left heart cath / coronary angiography performed by Juan Manuel Meadows MD at [facility] CARDIAC CATH LAB

## 2025-07-30 ENCOUNTER — APPOINTMENT (OUTPATIENT)
Dept: ULTRASOUND IMAGING | Age: 75
End: 2025-07-30
Payer: MEDICARE

## 2025-07-30 LAB
ANTI-XA UNFRAC HEPARIN: 0.17 IU/L
ANTI-XA UNFRAC HEPARIN: 0.29 IU/L
ANTI-XA UNFRAC HEPARIN: 0.5 IU/L
ANTI-XA UNFRAC HEPARIN: 0.77 IU/L
INR PPP: 1.3
PROTHROMBIN TIME: 16.5 SEC (ref 11.7–14.5)

## 2025-07-30 PROCEDURE — 6360000002 HC RX W HCPCS: Performed by: PHYSICIAN ASSISTANT

## 2025-07-30 PROCEDURE — 99232 SBSQ HOSP IP/OBS MODERATE 35: CPT | Performed by: INTERNAL MEDICINE

## 2025-07-30 PROCEDURE — 6370000000 HC RX 637 (ALT 250 FOR IP): Performed by: STUDENT IN AN ORGANIZED HEALTH CARE EDUCATION/TRAINING PROGRAM

## 2025-07-30 PROCEDURE — 5A09357 ASSISTANCE WITH RESPIRATORY VENTILATION, LESS THAN 24 CONSECUTIVE HOURS, CONTINUOUS POSITIVE AIRWAY PRESSURE: ICD-10-PCS | Performed by: STUDENT IN AN ORGANIZED HEALTH CARE EDUCATION/TRAINING PROGRAM

## 2025-07-30 PROCEDURE — 36415 COLL VENOUS BLD VENIPUNCTURE: CPT

## 2025-07-30 PROCEDURE — APPNB15 APP NON BILLABLE TIME 0-15 MINS

## 2025-07-30 PROCEDURE — 6370000000 HC RX 637 (ALT 250 FOR IP)

## 2025-07-30 PROCEDURE — 85520 HEPARIN ASSAY: CPT

## 2025-07-30 PROCEDURE — 93970 EXTREMITY STUDY: CPT

## 2025-07-30 PROCEDURE — 85610 PROTHROMBIN TIME: CPT

## 2025-07-30 PROCEDURE — 2500000003 HC RX 250 WO HCPCS: Performed by: PHYSICIAN ASSISTANT

## 2025-07-30 PROCEDURE — 1200000000 HC SEMI PRIVATE

## 2025-07-30 PROCEDURE — 6370000000 HC RX 637 (ALT 250 FOR IP): Performed by: PHYSICIAN ASSISTANT

## 2025-07-30 PROCEDURE — 94761 N-INVAS EAR/PLS OXIMETRY MLT: CPT

## 2025-07-30 RX ORDER — METOPROLOL TARTRATE 25 MG/1
25 TABLET, FILM COATED ORAL 2 TIMES DAILY
Qty: 180 TABLET | Refills: 1 | Status: SHIPPED | OUTPATIENT
Start: 2025-07-30

## 2025-07-30 RX ORDER — WARFARIN SODIUM 2 MG/1
4 TABLET ORAL
Status: COMPLETED | OUTPATIENT
Start: 2025-07-30 | End: 2025-07-30

## 2025-07-30 RX ORDER — ACETAMINOPHEN 325 MG/1
650 TABLET ORAL 4 TIMES DAILY PRN
Status: DISCONTINUED | OUTPATIENT
Start: 2025-07-30 | End: 2025-08-01 | Stop reason: HOSPADM

## 2025-07-30 RX ADMIN — POTASSIUM CHLORIDE 20 MEQ: 1500 TABLET, EXTENDED RELEASE ORAL at 09:38

## 2025-07-30 RX ADMIN — HEPARIN SODIUM 5000 UNITS: 1000 INJECTION INTRAVENOUS; SUBCUTANEOUS at 14:39

## 2025-07-30 RX ADMIN — ACETAMINOPHEN 650 MG: 325 TABLET ORAL at 20:28

## 2025-07-30 RX ADMIN — WARFARIN SODIUM 4 MG: 2 TABLET ORAL at 18:27

## 2025-07-30 RX ADMIN — HEPARIN SODIUM 5000 UNITS: 1000 INJECTION INTRAVENOUS; SUBCUTANEOUS at 03:33

## 2025-07-30 RX ADMIN — HEPARIN SODIUM 14 UNITS/KG/HR: 10000 INJECTION, SOLUTION INTRAVENOUS at 01:49

## 2025-07-30 RX ADMIN — METOPROLOL TARTRATE 25 MG: 25 TABLET, FILM COATED ORAL at 20:28

## 2025-07-30 RX ADMIN — ASPIRIN 81 MG: 81 TABLET, CHEWABLE ORAL at 09:40

## 2025-07-30 RX ADMIN — HEPARIN SODIUM 18 UNITS/KG/HR: 10000 INJECTION, SOLUTION INTRAVENOUS at 14:44

## 2025-07-30 NOTE — PROGRESS NOTES
V2.0  [facility] Hospitalist Progress Note      Name:  Hay Boyer /Age/Sex: 1950  (74 y.o. male)   MRN & CSN:  [id number] & [id number] Encounter Date/Time: 2025 9:28 AM EDT    Location:  [address] [place], [place] [address]A PCP: Rivas Beltran MD       Hospital Day: 3    Assessment and Plan:   Hay Boyer is a 74 y.o. male who presents with Acute pulmonary embolism (HCC)    Assessment and Plan:  Provoked pulmonary embolism to the right lung with mild troponin elevation.  Symptoms include pleuritic chest pain with shortness of breath and minimal hemoptysis.  Symptoms are associated with a recent long travel from [place].  Watchman device in place on Plavix after Watchman completed 45 days recently post watchman with warfarin.  Warfarin has restarted INR is 1.3 will check INR again today currently on heparin drip for bridging purposes.    Persistent atrial fibrillation s/p watchman on 2025 okay to hold off on Plavix while on heparin. Underlying history of bleeding  Benign essential hypertension on Norvasc losartan hydrochlorothiazide and Lopressor  Hyperlipidemia on Crestor  SEDRICK on CPAP at home  Morbid obesity recommend lifestyle modifications    Medical Decision Making:  The following items were considered in medical decision making:  Discussion of patient care with other providers  Reviewed clinical lab tests if any  Reviewed radiology tests if any  Reviewed other diagnostic tests/interventions  Independent review of radiologic images if any  Microbiology cultures and other micro tests if any    Estimated time spent for medical decision-making encompassing complexity of the case, history taking, medication review, physical examination, communication with family, RN, , discussion with specialists, and ancillary staff members to provide accurate care for the patient was around 35 minutes.    MDM (any 2 required for High level billing)     A. Problems (any 1)  [x] Acute/Chronic Illness/injury

## 2025-07-30 NOTE — PROGRESS NOTES
At 1444 gave pt 5,000 mg of heparin bolus, they right at 1512 lab came to room and patti an Ant-XA. This was drawn too early just increased the dose and gave the bolus. Explained to oncoming nurse will redraw tonight 6 hours after adjusting dose, called lab to notify them.

## 2025-07-30 NOTE — PROGRESS NOTES
PHARMACY ANTICOAGULATION MONITORING SERVICE    Hay Boyer is a 74 y.o. male on warfarin therapy. Pharmacy consulted by DOMO Little for monitoring and adjustment of treatment.    Indication for anticoagulation: Afib  INR goal: 2.0 - 3.0  Warfarin dose prior to admission: Tuesday through Sun Warfarin 2.5mg daily.  Hold warfarin on Mondays.    Pertinent Laboratory Values   Recent Labs     07/28/25  1924 07/28/25  1924 07/29/25  0000 07/29/25  0110 07/30/25  0119   INR  --    < > 1.2  --  1.3   HGB 15.1  --  15.6 15.0  --    HCT 45.0  --  47.7 44.8  --      --  230 214  --     < > = values in this interval not displayed.     Recent Warfarin doses given this admission...  Date INR Result Warfarin Dose Given   7/29 1.2 4 mg   7/30 1.3 4 mg (ordered)               Assessment/Plan:  Drug Interactions:   Home meds: Rosuvastatin may increase the anticoagulant effects of warfarin. Patient has been on both medications previous to this admission.  No significant drug interactions noted.  INR 1.3 - subtherapeutic to goal of 2.0 - 3.0  Patient home regimen is warfarin 2.5mg daily Tuesday through Sunday, hold warfarin on Mondays.  Repeat warfarin 4mg po tonight.  Anticipate moving to home regimen dosing tomorrow based on INR results.   Pharmacy will continue to monitor and adjust warfarin therapy as indicated    Thank you for the consult.  Sergey Vincent [facility], PharmD  7/30/2025 3:59 PM

## 2025-07-30 NOTE — PROGRESS NOTES
[place] [address]  Phone: [phone]    Fax [phone]                  Domonique Payton MD, [facility]       Juan Manuel Meadows MD, [facility]  Abdiel Storm MD, [facility]    MD Karena Powell MD Tariq Rizvi, MD Bilal Alam, MD Dr. Waseem Sajjad MD Melissa Kellis, APRN      Annamaria Smith, APRDERRICK Ferrer, APRDERRICK Andres, APRN  Eric Amaya PA-C    CARDIOLOGY  NOTE      Name:  Hay Boyer /Age/Sex: 1950  (74 y.o. male)   MRN & CSN:  [id number] & [id number] Admission Date/Time: 2025  7:08 PM   Location:  [address] [place] [address]A PCP: Rivas Beltran MD       Hospital Day: 3    - Cardiology consult is for:  PE      ASSESSMENT/ PLAN:  Provoked Pulmonary Embolism   - Symptoms have significantly improved.  - No evidence of heart strain on echocardiogram.  - Patient will be reinitiated on warfarin for cost reasons.  Was unable to afford NOAC in the past.  - Pharmacy to dose.  Goal INR 2-3.  -Will likely need anticoagulation for at least 3 months  Persistent Atrial Fibrillation s/p Watchman 25  - Remains in atrial fibrillation however rate controlled.  - Plavix withheld.  - Outpatient follow-up with EP  -Continue metoprolol tartrate 25 mg twice daily  Hypertension  -Blood pressure stable at this time  - Continue losartan 100 mg daily and amlodipine 5 mg daily.  SEDRICK on CPAP          Subjective:  Hay is a 74 y.o.year old     Patient still has some pleuritic chest discomfort but overall his breathing and chest discomfort have improved.  No significant lower extremity edema at this time.    Patient aware of need for Coumadin.    Objective: Temperature:  Current - Temp: 98.2 °F (36.8 °C); Max - Temp  Av °F (37.2 °C)  Min: 97.9 °F (36.6 °C)  Max: 100.1 °F (37.8 °C)    Respiratory Rate : Resp  Av.5  Min: 15  Max: 20    Pulse Range: Pulse  Av.5  Min: 69  Max: 130    Blood

## 2025-07-31 LAB
ANTI-XA UNFRAC HEPARIN: 0.32 IU/L
ANTI-XA UNFRAC HEPARIN: 0.35 IU/L
INR PPP: 1.3
PROTHROMBIN TIME: 16.7 SEC (ref 11.7–14.5)

## 2025-07-31 PROCEDURE — 94761 N-INVAS EAR/PLS OXIMETRY MLT: CPT

## 2025-07-31 PROCEDURE — 2500000003 HC RX 250 WO HCPCS: Performed by: PHYSICIAN ASSISTANT

## 2025-07-31 PROCEDURE — 85520 HEPARIN ASSAY: CPT

## 2025-07-31 PROCEDURE — 6370000000 HC RX 637 (ALT 250 FOR IP): Performed by: STUDENT IN AN ORGANIZED HEALTH CARE EDUCATION/TRAINING PROGRAM

## 2025-07-31 PROCEDURE — 36415 COLL VENOUS BLD VENIPUNCTURE: CPT

## 2025-07-31 PROCEDURE — 6370000000 HC RX 637 (ALT 250 FOR IP)

## 2025-07-31 PROCEDURE — 2500000003 HC RX 250 WO HCPCS: Performed by: STUDENT IN AN ORGANIZED HEALTH CARE EDUCATION/TRAINING PROGRAM

## 2025-07-31 PROCEDURE — 6370000000 HC RX 637 (ALT 250 FOR IP): Performed by: PHYSICIAN ASSISTANT

## 2025-07-31 PROCEDURE — 85610 PROTHROMBIN TIME: CPT

## 2025-07-31 PROCEDURE — 1200000000 HC SEMI PRIVATE

## 2025-07-31 RX ORDER — WARFARIN SODIUM 2 MG/1
4 TABLET ORAL
Status: COMPLETED | OUTPATIENT
Start: 2025-07-31 | End: 2025-07-31

## 2025-07-31 RX ADMIN — HYDROCHLOROTHIAZIDE 25 MG: 25 TABLET ORAL at 10:26

## 2025-07-31 RX ADMIN — ACETAMINOPHEN 650 MG: 325 TABLET ORAL at 20:50

## 2025-07-31 RX ADMIN — HEPARIN SODIUM 18 UNITS/KG/HR: 10000 INJECTION, SOLUTION INTRAVENOUS at 11:38

## 2025-07-31 RX ADMIN — WARFARIN SODIUM 4 MG: 2 TABLET ORAL at 18:27

## 2025-07-31 RX ADMIN — ASPIRIN 81 MG: 81 TABLET, CHEWABLE ORAL at 10:26

## 2025-07-31 RX ADMIN — SODIUM CHLORIDE, PRESERVATIVE FREE 10 ML: 5 INJECTION INTRAVENOUS at 20:52

## 2025-07-31 RX ADMIN — METOPROLOL TARTRATE 25 MG: 25 TABLET, FILM COATED ORAL at 20:50

## 2025-07-31 RX ADMIN — HEPARIN SODIUM 18 UNITS/KG/HR: 10000 INJECTION, SOLUTION INTRAVENOUS at 00:20

## 2025-07-31 RX ADMIN — POTASSIUM CHLORIDE 20 MEQ: 1500 TABLET, EXTENDED RELEASE ORAL at 10:25

## 2025-07-31 RX ADMIN — AMLODIPINE BESYLATE 5 MG: 5 TABLET ORAL at 10:26

## 2025-07-31 RX ADMIN — LOSARTAN POTASSIUM 100 MG: 100 TABLET, FILM COATED ORAL at 10:25

## 2025-07-31 RX ADMIN — METOPROLOL TARTRATE 25 MG: 25 TABLET, FILM COATED ORAL at 10:26

## 2025-07-31 NOTE — PROGRESS NOTES
PHARMACY ANTICOAGULATION MONITORING SERVICE    Hay Boyer is a 74 y.o. male on warfarin therapy. Pharmacy consulted by DOMO Little for monitoring and adjustment of treatment.    Indication for anticoagulation: Afib  INR goal: 2.0 - 3.0  Warfarin dose prior to admission: Tuesday through Sun Warfarin 2.5mg daily.  Hold warfarin on Mondays.    Pertinent Laboratory Values   Recent Labs     07/28/25  1924 07/29/25  0000 07/29/25  0110 07/30/25  0119 07/31/25  0158   INR  --  1.2  --    < > 1.3   HGB 15.1 15.6 15.0  --   --    HCT 45.0 47.7 44.8  --   --     230 214  --   --     < > = values in this interval not displayed.     Recent Warfarin doses given this admission...  Date INR Result Warfarin Dose Given   7/29 1.2 4 mg   7/30 1.3 4 mg   7/31 1.3 4 mg (ordered)          Assessment/Plan:  Drug Interactions:   Home meds: Rosuvastatin may increase the anticoagulant effects of warfarin. Patient has been on both medications previous to this admission.  Heparin drip bridge until INR is therapeutic.  INR 1.3 remains subtherapeutic to goal of 2.0 - 3.0.  Higher than normal warfarin 4mg doses given the last 2 nights.  Patient home regimen is warfarin 2.5mg daily Tuesday through Sunday, hold warfarin on Mondays.  Repeat warfarin 4mg po again tonight.  If no significant upward movement in INR by tomorrow, will consider another dose increase.   Pharmacy will continue to monitor and adjust warfarin therapy as indicated    Thank you for the consult.  Dante Magana RPH  7/31/2025 5:01 PM

## 2025-07-31 NOTE — PLAN OF CARE
Problem: Chronic Conditions and Co-morbidities  Goal: Patient's chronic conditions and co-morbidity symptoms are monitored and maintained or improved  7/31/2025 0025 by Sergey Maldonado LPN  Outcome: Progressing  7/30/2025 1554 by Annamaria Zeng RN  Outcome: Progressing     Problem: Discharge Planning  Goal: Discharge to home or other facility with appropriate resources  7/31/2025 0025 by Sergey Maldonado LPN  Outcome: Progressing  7/30/2025 1554 by Annamaria Zeng RN  Outcome: Progressing     Problem: Pain  Goal: Verbalizes/displays adequate comfort level or baseline comfort level  7/31/2025 0025 by Sergey Maldonado LPN  Outcome: Progressing  7/30/2025 1554 by Annamaria Zeng RN  Outcome: Progressing     Problem: Safety - Adult  Goal: Free from fall injury  7/31/2025 0025 by Sergey Maldonado LPN  Outcome: Progressing  7/30/2025 1554 by Annamaria Zeng RN  Outcome: Progressing

## 2025-07-31 NOTE — PROGRESS NOTES
V2.0  [facility] Hospitalist Progress Note      Name:  Hay Boyer /Age/Sex: 1950  (74 y.o. male)   MRN & CSN:  [id number] & [id number] Encounter Date/Time: 2025 9:28 AM EDT    Location:  [address] [place] [address]-A PCP: Rivas Beltran MD       Hospital Day: 4    Assessment and Plan:   Hay Boyer is a 74 y.o. male who presents with Acute pulmonary embolism (HCC)    Assessment and Plan:  Provoked pulmonary embolism to the right lung with mild troponin elevation.  Symptoms include pleuritic chest pain with shortness of breath and minimal hemoptysis.  Symptoms are associated with a recent long travel from [place].  Watchman device in place on Plavix after Watchman completed 45 days recently post watchman with warfarin.  Warfarin has restarted INR is 1.3 will check INR again today currently on heparin drip for bridging purposes.    Persistent atrial fibrillation s/p watchman on 2025 okay to hold off on Plavix while on heparin. Underlying history of bleeding  Benign essential hypertension on Norvasc losartan hydrochlorothiazide and Lopressor  Hyperlipidemia on Crestor  SEDRICK on CPAP at home  Morbid obesity recommend lifestyle modifications    Medical Decision Making:  The following items were considered in medical decision making:  Discussion of patient care with other providers  Reviewed clinical lab tests if any  Reviewed radiology tests if any  Reviewed other diagnostic tests/interventions  Independent review of radiologic images if any  Microbiology cultures and other micro tests if any    Estimated time spent for medical decision-making encompassing complexity of the case, history taking, medication review, physical examination, communication with family, RN, , discussion with specialists, and ancillary staff members to provide accurate care for the patient was around 35 minutes.    MDM (any 2 required for High level billing)     A. Problems (any 1)  [x] Acute/Chronic Illness/injury  Anti-XA Unfrac Heparin 0.32 IU/L        Imaging/Diagnostics Last 24 Hours   CT ABDOMEN PELVIS W IV CONTRAST Additional Contrast? None  Result Date: 7/28/2025  CT ABDOMEN PELVIS W IV CONTRAST right flank pain CT radiation dose optimization techniques (automated exposure control, and use of iterative reconstruction techniques, or adjustment of the mA  and/or kV according to patient size) were used to limit patient radiation dose. Technique:  Axial and coronal images were performed. IOPAMIDOL 76 % IV SOLN 75mL Comparison: None Findings:  CT ABDOMEN: Liver appears unremarkable. The spleen, adrenals, pancreas   are unremarkable. Gallbladder demonstrates no calcifications.  Common bile duct is not particularly distended. No hydronephrosis. No calculi seen in either kidney. Abdominal aorta is of normal caliber.  Atherosclerotic vascular calcifications are noted. No significant dilated loops of bowel are seen. No significant para-aortic nor mesenteric adenopathy are seen. CT PELVIS: CT images of the pelvis demonstrate No free pelvic fluid.  The prostate is Not particularly enlarged. Seminal vesicles are unremarkable. Diverticular disease Sigmoid colon is noted. Normal appendix is not visualized but no pericecal inflammation nor fluid is seen. Bone windows demonstrate moderate discogenic disease and facet arthropathy in the lower lumbar region.  No destructive bony changes are seen. Lung bases demonstrate infiltrate more so on right than left.  IMPRESSION:  Nonacute CT of the abdomen and pelvis. Pulmonary infiltrate worse at the right than left lung base. If there is persistent clinical concern, appropriate additional investigations maybe performed. This dictation was created with voice recognition software. Although every effort is made to review the dictation as it is transcribed, on occasion spoken words, phrases, names, numbers, punctations etc. can be misinterpreted by the technology leading to omissions or

## 2025-08-01 VITALS
OXYGEN SATURATION: 97 % | DIASTOLIC BLOOD PRESSURE: 76 MMHG | TEMPERATURE: 98.4 F | BODY MASS INDEX: 39.17 KG/M2 | RESPIRATION RATE: 17 BRPM | WEIGHT: 315 LBS | HEART RATE: 66 BPM | SYSTOLIC BLOOD PRESSURE: 129 MMHG | HEIGHT: 75 IN

## 2025-08-01 LAB
ANTI-XA UNFRAC HEPARIN: <0.1 IU/L
INR PPP: 1.3
PROTHROMBIN TIME: 16.6 SEC (ref 11.7–14.5)

## 2025-08-01 PROCEDURE — 85520 HEPARIN ASSAY: CPT

## 2025-08-01 PROCEDURE — 85610 PROTHROMBIN TIME: CPT

## 2025-08-01 PROCEDURE — 94761 N-INVAS EAR/PLS OXIMETRY MLT: CPT

## 2025-08-01 PROCEDURE — 6370000000 HC RX 637 (ALT 250 FOR IP): Performed by: PHYSICIAN ASSISTANT

## 2025-08-01 PROCEDURE — 36415 COLL VENOUS BLD VENIPUNCTURE: CPT

## 2025-08-01 PROCEDURE — 2500000003 HC RX 250 WO HCPCS: Performed by: PHYSICIAN ASSISTANT

## 2025-08-01 PROCEDURE — 6370000000 HC RX 637 (ALT 250 FOR IP): Performed by: STUDENT IN AN ORGANIZED HEALTH CARE EDUCATION/TRAINING PROGRAM

## 2025-08-01 PROCEDURE — 6360000002 HC RX W HCPCS: Performed by: PHYSICIAN ASSISTANT

## 2025-08-01 RX ORDER — WARFARIN SODIUM 2 MG/1
4 TABLET ORAL DAILY
Qty: 30 TABLET | Refills: 3 | Status: SHIPPED | OUTPATIENT
Start: 2025-08-01

## 2025-08-01 RX ORDER — ENOXAPARIN SODIUM 100 MG/ML
1 INJECTION SUBCUTANEOUS 2 TIMES DAILY
Qty: 9.6 ML | Refills: 0 | Status: SHIPPED | OUTPATIENT
Start: 2025-08-01 | End: 2025-08-04

## 2025-08-01 RX ADMIN — ACETAMINOPHEN 650 MG: 325 TABLET ORAL at 01:45

## 2025-08-01 RX ADMIN — HEPARIN SODIUM 10000 UNITS: 1000 INJECTION INTRAVENOUS; SUBCUTANEOUS at 05:44

## 2025-08-01 RX ADMIN — HEPARIN SODIUM 18 UNITS/KG/HR: 10000 INJECTION, SOLUTION INTRAVENOUS at 01:08

## 2025-08-01 RX ADMIN — ASPIRIN 81 MG: 81 TABLET, CHEWABLE ORAL at 09:41

## 2025-08-01 RX ADMIN — HEPARIN SODIUM 22 UNITS/KG/HR: 10000 INJECTION, SOLUTION INTRAVENOUS at 05:45

## 2025-08-01 RX ADMIN — POTASSIUM CHLORIDE 20 MEQ: 1500 TABLET, EXTENDED RELEASE ORAL at 09:40

## 2025-08-01 NOTE — DISCHARGE INSTR - DIET

## 2025-08-01 NOTE — PLAN OF CARE
Problem: Chronic Conditions and Co-morbidities  Goal: Patient's chronic conditions and co-morbidity symptoms are monitored and maintained or improved  7/31/2025 2058 by Eladia Michelle LPN  Outcome: Progressing  7/31/2025 1440 by Therese Hawthorne RN  Outcome: Progressing     Problem: Discharge Planning  Goal: Discharge to home or other facility with appropriate resources  7/31/2025 2058 by Eladia Michelle LPN  Outcome: Progressing  7/31/2025 1440 by Therese Hawthorne RN  Outcome: Progressing     Problem: Pain  Goal: Verbalizes/displays adequate comfort level or baseline comfort level  7/31/2025 2058 by Eladia Michelle LPN  Outcome: Progressing  7/31/2025 1440 by Therese Hawthorne RN  Outcome: Progressing     Problem: Safety - Adult  Goal: Free from fall injury  7/31/2025 2058 by Eladia Michelle LPN  Outcome: Progressing  7/31/2025 1440 by Therese Hawthorne RN  Outcome: Progressing

## 2025-08-01 NOTE — DISCHARGE SUMMARY
V2.0  Discharge Summary    Name:  Hay Boyer /Age/Sex: 1950 (74 y.o. male)   Admit Date: 2025  Discharge Date: 25    MRN & CSN:  [id number] & [id number] Encounter Date and Time 25 9:37 AM EDT    Attending:  Kizzy Macias MD Discharging Provider: Kizzy Macias MD       Hospital Course:     Brief HPI: Hay Boyer is a 74 y.o. male who presented with chest pain    Brief Hospital course summary:   74-year-old male with history of atrial fibrillation status post Watchman device in May 2025 and was placed on Plavix after completing 45 days of warfarin which the patient completed a month ago presented to the hospital because of chest pain and was found to have provoked pulmonary embolism.  Patient was started on heparin drip patient had recent long travel from [place].  Plavix was held.  Warfarin was initiated INR has been consistently normal 1.3 taking its time to go up.  Discussed with cardiology who is given and okay for the patient to be discharged with Lovenox bridge for 3 days along with INR check recommendation outpatient patient has previous history of INR checks and will follow-up with PCP as per the patient.  Will give 3 days of Lovenox shot on top of that.  Medically stable for discharge      Assessment and short plans of problems assessed and managed during hospital stay are as under:  Pulmonary embolism provoked by prolonged immobilization with long travel plan as above  Persistent atrial fibrillation status post watchman on May 2025 okay to resume Plavix at the moment but may discontinue once the patient is therapeutic and seen cardiology outpatient, defer the decision to cardiology  Benign essential hypertension on Norvasc and losartan hydrochlorothiazide Lopressor which can be continued  Hyperlipidemia on Crestor  SEDRICK on CPAP at home  Morbid obesity recommend lifestyle modifications    Medical Decision Making:  The following items were considered in medical decision  supple.      Right lower leg: No edema.      Left lower leg: No edema.   Skin:     Coloration: Skin is not jaundiced or pale.   Neurological:      General: No focal deficit present.      Mental Status: He is alert and oriented to person, place, and time. Mental status is at baseline.           Labs and Imaging   Echo (TTE) complete (PRN contrast/bubble/strain/3D)  Result Date: 7/29/2025    Left Ventricle: Normal left ventricular systolic function with a visually estimated EF of 55 - 60%. Left ventricle size is normal. Mildly increased wall thickness. Sigmoid septal thickening.   Image quality is technically difficult due to body habitus.   Left Ventricle: Normal wall motion.   Left Ventricle: Abnormal diastolic function.   Right Atrium: Right atrium is severely dilated.   Right Ventricle: Right ventricle is severely dilated but no evidence of right heart strain.   Left Atrium: Left atrium is severely dilated.   Aortic Valve: Aortic valve leaflets are thickened. Increased LVOT gradients likely due to sigmoid septum.   Aortic Valve: Mild regurgitation. AR PHT is 637.0 ms.   Tricuspid Valve: Mild regurgitation. RVSP is 30 mmHg.   Pericardium: There is evidence of epicardial fat. No pericardial effusion.     CT ABDOMEN PELVIS W IV CONTRAST Additional Contrast? None  Result Date: 7/28/2025  CT ABDOMEN PELVIS W IV CONTRAST right flank pain CT radiation dose optimization techniques (automated exposure control, and use of iterative reconstruction techniques, or adjustment of the mA  and/or kV according to patient size) were used to limit patient radiation dose. Technique:  Axial and coronal images were performed. IOPAMIDOL 76 % IV SOLN 75mL Comparison: None Findings:  CT ABDOMEN: Liver appears unremarkable. The spleen, adrenals, pancreas   are unremarkable. Gallbladder demonstrates no calcifications.  Common bile duct is not particularly distended. No hydronephrosis. No calculi seen in either kidney. Abdominal aorta is of

## 2025-08-01 NOTE — PROGRESS NOTES
Outpatient Pharmacy Progress Note for Meds-to-Beds    Total number of Prescriptions Filled: 2    Additional Documentation:  Patient's family member picked-up the medication(s) in the OP Pharmacy      Thank you for letting us serve your patients.  [address] [address]    Phone: [phone]    Fax: [phone]

## 2025-08-04 ENCOUNTER — CLINICAL SUPPORT (OUTPATIENT)
Dept: FAMILY MEDICINE CLINIC | Age: 75
End: 2025-08-04

## 2025-08-04 ENCOUNTER — CARE COORDINATION (OUTPATIENT)
Dept: CARE COORDINATION | Age: 75
End: 2025-08-04

## 2025-08-04 DIAGNOSIS — I48.91 ATRIAL FIBRILLATION, UNSPECIFIED TYPE (HCC): Primary | ICD-10-CM

## 2025-08-04 DIAGNOSIS — I10 ESSENTIAL HYPERTENSION: ICD-10-CM

## 2025-08-04 LAB — INTERNATIONAL NORMALIZATION RATIO, POC: 2

## 2025-08-04 RX ORDER — POTASSIUM CHLORIDE 1500 MG/1
20 TABLET, EXTENDED RELEASE ORAL DAILY
Qty: 90 TABLET | Refills: 1 | Status: SHIPPED | OUTPATIENT
Start: 2025-08-04

## 2025-08-05 ENCOUNTER — TELEPHONE (OUTPATIENT)
Dept: CARDIOLOGY CLINIC | Age: 75
End: 2025-08-05

## 2025-08-05 ENCOUNTER — CARE COORDINATION (OUTPATIENT)
Dept: CARE COORDINATION | Age: 75
End: 2025-08-05

## 2025-08-05 ENCOUNTER — TELEPHONE (OUTPATIENT)
Dept: PHARMACY | Age: 75
End: 2025-08-05

## 2025-08-05 DIAGNOSIS — I26.99 OTHER ACUTE PULMONARY EMBOLISM, UNSPECIFIED WHETHER ACUTE COR PULMONALE PRESENT (HCC): Primary | ICD-10-CM

## 2025-08-05 PROCEDURE — 1111F DSCHRG MED/CURRENT MED MERGE: CPT | Performed by: STUDENT IN AN ORGANIZED HEALTH CARE EDUCATION/TRAINING PROGRAM

## 2025-08-06 ENCOUNTER — OFFICE VISIT (OUTPATIENT)
Dept: CARDIOLOGY CLINIC | Age: 75
End: 2025-08-06
Payer: MEDICARE

## 2025-08-06 VITALS
SYSTOLIC BLOOD PRESSURE: 118 MMHG | BODY MASS INDEX: 39.17 KG/M2 | HEART RATE: 63 BPM | WEIGHT: 315 LBS | DIASTOLIC BLOOD PRESSURE: 62 MMHG | OXYGEN SATURATION: 98 % | HEIGHT: 75 IN

## 2025-08-06 DIAGNOSIS — Z09 HOSPITAL DISCHARGE FOLLOW-UP: Primary | ICD-10-CM

## 2025-08-06 DIAGNOSIS — I26.01 ACUTE SEPTIC PULMONARY EMBOLISM WITH ACUTE COR PULMONALE (HCC): ICD-10-CM

## 2025-08-06 PROCEDURE — 1123F ACP DISCUSS/DSCN MKR DOCD: CPT | Performed by: INTERNAL MEDICINE

## 2025-08-06 PROCEDURE — 1159F MED LIST DOCD IN RCRD: CPT | Performed by: INTERNAL MEDICINE

## 2025-08-06 PROCEDURE — 99214 OFFICE O/P EST MOD 30 MIN: CPT | Performed by: INTERNAL MEDICINE

## 2025-08-06 PROCEDURE — 3074F SYST BP LT 130 MM HG: CPT | Performed by: INTERNAL MEDICINE

## 2025-08-06 PROCEDURE — 1111F DSCHRG MED/CURRENT MED MERGE: CPT | Performed by: INTERNAL MEDICINE

## 2025-08-06 PROCEDURE — 3078F DIAST BP <80 MM HG: CPT | Performed by: INTERNAL MEDICINE

## 2025-08-08 ENCOUNTER — CLINICAL SUPPORT (OUTPATIENT)
Dept: FAMILY MEDICINE CLINIC | Age: 75
End: 2025-08-08
Payer: MEDICARE

## 2025-08-08 ENCOUNTER — CARE COORDINATION (OUTPATIENT)
Dept: CASE MANAGEMENT | Age: 75
End: 2025-08-08

## 2025-08-08 DIAGNOSIS — I48.91 ATRIAL FIBRILLATION, UNSPECIFIED TYPE (HCC): ICD-10-CM

## 2025-08-08 LAB
INTERNATIONAL NORMALIZATION RATIO, POC: 3.6
PROTHROMBIN TIME, POC: 43.8

## 2025-08-08 PROCEDURE — 85610 PROTHROMBIN TIME: CPT | Performed by: FAMILY MEDICINE

## 2025-08-08 PROCEDURE — 36415 COLL VENOUS BLD VENIPUNCTURE: CPT | Performed by: FAMILY MEDICINE

## 2025-08-11 ENCOUNTER — TELEPHONE (OUTPATIENT)
Dept: PHARMACY | Age: 75
End: 2025-08-11

## 2025-08-11 DIAGNOSIS — I48.19 ATRIAL FIBRILLATION, PERSISTENT (HCC): ICD-10-CM

## 2025-08-11 DIAGNOSIS — I48.19 ATRIAL FIBRILLATION, PERSISTENT (HCC): Primary | ICD-10-CM

## 2025-08-11 DIAGNOSIS — Z95.818 PRESENCE OF WATCHMAN LEFT ATRIAL APPENDAGE CLOSURE DEVICE: ICD-10-CM

## 2025-08-11 DIAGNOSIS — I26.99 OTHER ACUTE PULMONARY EMBOLISM WITHOUT ACUTE COR PULMONALE (HCC): ICD-10-CM

## 2025-08-11 DIAGNOSIS — I48.11 LONGSTANDING PERSISTENT ATRIAL FIBRILLATION (HCC): Primary | ICD-10-CM

## 2025-08-11 DIAGNOSIS — I48.19 PERSISTENT ATRIAL FIBRILLATION (HCC): ICD-10-CM

## 2025-08-12 ENCOUNTER — OFFICE VISIT (OUTPATIENT)
Dept: PULMONOLOGY | Age: 75
End: 2025-08-12
Payer: MEDICARE

## 2025-08-12 VITALS
SYSTOLIC BLOOD PRESSURE: 128 MMHG | DIASTOLIC BLOOD PRESSURE: 78 MMHG | HEART RATE: 55 BPM | HEIGHT: 75 IN | OXYGEN SATURATION: 98 % | WEIGHT: 315 LBS | BODY MASS INDEX: 39.17 KG/M2

## 2025-08-12 DIAGNOSIS — E66.01 MORBID OBESITY WITH BMI OF 40.0-44.9, ADULT (HCC): ICD-10-CM

## 2025-08-12 DIAGNOSIS — R06.02 SOBOE (SHORTNESS OF BREATH ON EXERTION): ICD-10-CM

## 2025-08-12 DIAGNOSIS — I26.99 OTHER ACUTE PULMONARY EMBOLISM WITHOUT ACUTE COR PULMONALE (HCC): ICD-10-CM

## 2025-08-12 DIAGNOSIS — R91.8 RIGHT LOWER LOBE LUNG MASS: ICD-10-CM

## 2025-08-12 DIAGNOSIS — G47.33 OBSTRUCTIVE SLEEP APNEA SYNDROME: Primary | ICD-10-CM

## 2025-08-12 DIAGNOSIS — G47.10 HYPERSOMNIA: ICD-10-CM

## 2025-08-12 PROCEDURE — 1123F ACP DISCUSS/DSCN MKR DOCD: CPT | Performed by: INTERNAL MEDICINE

## 2025-08-12 PROCEDURE — 3074F SYST BP LT 130 MM HG: CPT | Performed by: INTERNAL MEDICINE

## 2025-08-12 PROCEDURE — 1159F MED LIST DOCD IN RCRD: CPT | Performed by: INTERNAL MEDICINE

## 2025-08-12 PROCEDURE — 3078F DIAST BP <80 MM HG: CPT | Performed by: INTERNAL MEDICINE

## 2025-08-12 PROCEDURE — 99204 OFFICE O/P NEW MOD 45 MIN: CPT | Performed by: INTERNAL MEDICINE

## 2025-08-12 ASSESSMENT — ENCOUNTER SYMPTOMS
BACK PAIN: 0
ABDOMINAL PAIN: 0
SHORTNESS OF BREATH: 0
ABDOMINAL DISTENTION: 0
EYE ITCHING: 0
EYE DISCHARGE: 0
COUGH: 0

## 2025-08-12 ASSESSMENT — SLEEP AND FATIGUE QUESTIONNAIRES
HOW LIKELY ARE YOU TO NOD OFF OR FALL ASLEEP WHILE SITTING INACTIVE IN A PUBLIC PLACE: MODERATE CHANCE OF DOZING
HOW LIKELY ARE YOU TO NOD OFF OR FALL ASLEEP WHEN YOU ARE A PASSENGER IN A CAR FOR AN HOUR WITHOUT A BREAK: MODERATE CHANCE OF DOZING
HOW LIKELY ARE YOU TO NOD OFF OR FALL ASLEEP WHILE SITTING AND READING: HIGH CHANCE OF DOZING
HOW LIKELY ARE YOU TO NOD OFF OR FALL ASLEEP WHILE LYING DOWN TO REST IN THE AFTERNOON WHEN CIRCUMSTANCES PERMIT: HIGH CHANCE OF DOZING
HOW LIKELY ARE YOU TO NOD OFF OR FALL ASLEEP WHILE SITTING AND TALKING TO SOMEONE: SLIGHT CHANCE OF DOZING
ESS TOTAL SCORE: 16
HOW LIKELY ARE YOU TO NOD OFF OR FALL ASLEEP WHILE WATCHING TV: MODERATE CHANCE OF DOZING
HOW LIKELY ARE YOU TO NOD OFF OR FALL ASLEEP IN A CAR, WHILE STOPPED FOR A FEW MINUTES IN TRAFFIC: SLIGHT CHANCE OF DOZING
HOW LIKELY ARE YOU TO NOD OFF OR FALL ASLEEP WHILE SITTING QUIETLY AFTER LUNCH WITHOUT ALCOHOL: MODERATE CHANCE OF DOZING

## 2025-08-14 ENCOUNTER — ANTI-COAG VISIT (OUTPATIENT)
Dept: PHARMACY | Age: 75
End: 2025-08-14
Payer: MEDICARE

## 2025-08-14 DIAGNOSIS — I26.99 OTHER ACUTE PULMONARY EMBOLISM WITHOUT ACUTE COR PULMONALE (HCC): ICD-10-CM

## 2025-08-14 DIAGNOSIS — Z95.818 PRESENCE OF WATCHMAN LEFT ATRIAL APPENDAGE CLOSURE DEVICE: ICD-10-CM

## 2025-08-14 DIAGNOSIS — I48.19 ATRIAL FIBRILLATION, PERSISTENT (HCC): ICD-10-CM

## 2025-08-14 DIAGNOSIS — I48.11 LONGSTANDING PERSISTENT ATRIAL FIBRILLATION (HCC): Primary | ICD-10-CM

## 2025-08-14 LAB
INTERNATIONAL NORMALIZATION RATIO, POC: 2.9
POC INR: 2.9 (ref 0.9–1.2)
PROTHROMBIN TIME, POC: NORMAL

## 2025-08-14 PROCEDURE — 99212 OFFICE O/P EST SF 10 MIN: CPT

## 2025-08-14 PROCEDURE — 85610 PROTHROMBIN TIME: CPT

## 2025-08-15 ENCOUNTER — CARE COORDINATION (OUTPATIENT)
Dept: CARE COORDINATION | Age: 75
End: 2025-08-15

## 2025-08-18 ENCOUNTER — ANTI-COAG VISIT (OUTPATIENT)
Dept: PHARMACY | Age: 75
End: 2025-08-18
Payer: MEDICARE

## 2025-08-18 DIAGNOSIS — I48.11 LONGSTANDING PERSISTENT ATRIAL FIBRILLATION (HCC): Primary | ICD-10-CM

## 2025-08-18 DIAGNOSIS — Z95.818 PRESENCE OF WATCHMAN LEFT ATRIAL APPENDAGE CLOSURE DEVICE: ICD-10-CM

## 2025-08-18 DIAGNOSIS — I26.99 OTHER ACUTE PULMONARY EMBOLISM WITHOUT ACUTE COR PULMONALE (HCC): ICD-10-CM

## 2025-08-18 DIAGNOSIS — I48.19 ATRIAL FIBRILLATION, PERSISTENT (HCC): ICD-10-CM

## 2025-08-18 LAB
INTERNATIONAL NORMALIZATION RATIO, POC: 3
POC INR: 3 (ref 0.9–1.2)
PROTHROMBIN TIME, POC: NORMAL

## 2025-08-18 PROCEDURE — 99212 OFFICE O/P EST SF 10 MIN: CPT

## 2025-08-18 PROCEDURE — 85610 PROTHROMBIN TIME: CPT

## 2025-08-18 RX ORDER — WARFARIN SODIUM 1 MG/1
2 TABLET ORAL DAILY
Qty: 180 TABLET | Refills: 0 | Status: SHIPPED | OUTPATIENT
Start: 2025-08-18

## 2025-08-20 ENCOUNTER — CARE COORDINATION (OUTPATIENT)
Dept: CASE MANAGEMENT | Age: 75
End: 2025-08-20

## 2025-08-25 ENCOUNTER — ANTI-COAG VISIT (OUTPATIENT)
Dept: PHARMACY | Age: 75
End: 2025-08-25
Payer: MEDICARE

## 2025-08-25 ENCOUNTER — TELEPHONE (OUTPATIENT)
Dept: SLEEP CENTER | Age: 75
End: 2025-08-25

## 2025-08-25 DIAGNOSIS — I48.11 LONGSTANDING PERSISTENT ATRIAL FIBRILLATION (HCC): Primary | ICD-10-CM

## 2025-08-25 DIAGNOSIS — I26.99 OTHER ACUTE PULMONARY EMBOLISM WITHOUT ACUTE COR PULMONALE (HCC): ICD-10-CM

## 2025-08-25 DIAGNOSIS — I48.19 ATRIAL FIBRILLATION, PERSISTENT (HCC): ICD-10-CM

## 2025-08-25 DIAGNOSIS — Z95.818 PRESENCE OF WATCHMAN LEFT ATRIAL APPENDAGE CLOSURE DEVICE: ICD-10-CM

## 2025-08-25 LAB
INTERNATIONAL NORMALIZATION RATIO, POC: 2.4
POC INR: 2.4 (ref 0.9–1.2)
PROTHROMBIN TIME, POC: NORMAL

## 2025-08-25 PROCEDURE — 85610 PROTHROMBIN TIME: CPT

## 2025-08-25 PROCEDURE — 99211 OFF/OP EST MAY X REQ PHY/QHP: CPT

## 2025-08-27 ENCOUNTER — CARE COORDINATION (OUTPATIENT)
Dept: CASE MANAGEMENT | Age: 75
End: 2025-08-27

## 2025-08-28 ENCOUNTER — TELEPHONE (OUTPATIENT)
Dept: FAMILY MEDICINE CLINIC | Age: 75
End: 2025-08-28

## 2025-08-29 ENCOUNTER — CARE COORDINATION (OUTPATIENT)
Dept: CASE MANAGEMENT | Age: 75
End: 2025-08-29

## 2025-09-01 ENCOUNTER — APPOINTMENT (OUTPATIENT)
Dept: GENERAL RADIOLOGY | Age: 75
End: 2025-09-01
Payer: MEDICARE

## 2025-09-01 ENCOUNTER — APPOINTMENT (OUTPATIENT)
Dept: CT IMAGING | Age: 75
End: 2025-09-01
Payer: MEDICARE

## 2025-09-01 ENCOUNTER — HOSPITAL ENCOUNTER (EMERGENCY)
Age: 75
Discharge: HOME | End: 2025-09-01
Attending: EMERGENCY MEDICINE
Payer: MEDICARE

## 2025-09-01 VITALS
RESPIRATION RATE: 16 BRPM | DIASTOLIC BLOOD PRESSURE: 85 MMHG | HEART RATE: 92 BPM | OXYGEN SATURATION: 96 % | TEMPERATURE: 97.2 F | WEIGHT: 315 LBS | BODY MASS INDEX: 39.17 KG/M2 | HEIGHT: 75 IN | SYSTOLIC BLOOD PRESSURE: 143 MMHG

## 2025-09-01 DIAGNOSIS — M19.09: Primary | ICD-10-CM

## 2025-09-01 PROCEDURE — 70150 X-RAY EXAM OF FACIAL BONES: CPT

## 2025-09-01 PROCEDURE — 70486 CT MAXILLOFACIAL W/O DYE: CPT

## 2025-09-01 PROCEDURE — 99284 EMERGENCY DEPT VISIT MOD MDM: CPT

## 2025-09-01 RX ORDER — NAPROXEN 500 MG/1
500 TABLET ORAL 2 TIMES DAILY WITH MEALS
Qty: 20 TABLET | Refills: 0 | Status: SHIPPED | OUTPATIENT
Start: 2025-09-01 | End: 2025-09-11

## 2025-09-01 ASSESSMENT — PAIN DESCRIPTION - PAIN TYPE: TYPE: ACUTE PAIN

## 2025-09-01 ASSESSMENT — PAIN DESCRIPTION - ORIENTATION: ORIENTATION: RIGHT

## 2025-09-01 ASSESSMENT — PAIN DESCRIPTION - LOCATION: LOCATION: JAW;EAR

## 2025-09-01 ASSESSMENT — PAIN SCALES - GENERAL: PAINLEVEL_OUTOF10: 3

## 2025-09-01 ASSESSMENT — PAIN DESCRIPTION - DESCRIPTORS: DESCRIPTORS: ACHING

## 2025-09-01 ASSESSMENT — PAIN - FUNCTIONAL ASSESSMENT
PAIN_FUNCTIONAL_ASSESSMENT: PREVENTS OR INTERFERES SOME ACTIVE ACTIVITIES AND ADLS
PAIN_FUNCTIONAL_ASSESSMENT: 0-10

## 2025-09-02 ENCOUNTER — CARE COORDINATION (OUTPATIENT)
Dept: CASE MANAGEMENT | Age: 75
End: 2025-09-02

## 2025-09-04 ENCOUNTER — ANTI-COAG VISIT (OUTPATIENT)
Dept: PHARMACY | Age: 75
End: 2025-09-04
Payer: MEDICARE

## 2025-09-04 ENCOUNTER — TELEPHONE (OUTPATIENT)
Dept: PHARMACY | Age: 75
End: 2025-09-04

## 2025-09-04 DIAGNOSIS — I48.11 LONGSTANDING PERSISTENT ATRIAL FIBRILLATION (HCC): Primary | ICD-10-CM

## 2025-09-04 DIAGNOSIS — I26.99 OTHER ACUTE PULMONARY EMBOLISM WITHOUT ACUTE COR PULMONALE (HCC): ICD-10-CM

## 2025-09-04 DIAGNOSIS — Z95.818 PRESENCE OF WATCHMAN LEFT ATRIAL APPENDAGE CLOSURE DEVICE: ICD-10-CM

## 2025-09-04 DIAGNOSIS — I48.19 ATRIAL FIBRILLATION, PERSISTENT (HCC): ICD-10-CM

## 2025-09-04 LAB
INTERNATIONAL NORMALIZATION RATIO, POC: 2.5
POC INR: 2.5 (ref 0.9–1.2)
PROTHROMBIN TIME, POC: NORMAL

## 2025-09-04 PROCEDURE — 99211 OFF/OP EST MAY X REQ PHY/QHP: CPT

## 2025-09-04 PROCEDURE — 85610 PROTHROMBIN TIME: CPT

## (undated) DEVICE — CATHETER ANGIO 4FR L100CM S STL NYL JL3.5 3 SEG BRAID SFT

## (undated) DEVICE — GUIDEWIRE VASC L260CM DIA0.035IN RAD 3MM J TIP L7CM PTFE

## (undated) DEVICE — CATHETER ANGIO 6FR L110CM ID0.056IN VENT PIG CRV ROBUST

## (undated) DEVICE — WIRE GUID DIAG PTFE COAT FIX COR STD XIBLE J TIP 7MM

## (undated) DEVICE — CATHETER ANGIO STR 038IN 4FR MCRLP RED

## (undated) DEVICE — 1 X VERSACROSS LARGE ACCESS TRANSSEPTAL DILATOR (INCLUDING 1 X J-TIP MECHANICAL GUIDEWIRE); 1 X VERSACROSS RF WIRE (INCLUDING 1 X CONNECTOR CABLE (SINGLE USE)); 1 X DISPERSIVE ELECTRODE: Brand: VERSACROSS LARGE ACCESS SOLUTION

## (undated) DEVICE — Device

## (undated) DEVICE — ANGIOGRAPHY KIT CUST MANIFOLD

## (undated) DEVICE — DILATOR: Brand: COOK

## (undated) DEVICE — RADIFOCUS OPTITORQUE ANGIOGRAPHIC CATHETER: Brand: OPTITORQUE

## (undated) DEVICE — GUIDEWIRE VASC L180CM DIA0.035IN L7CM DIA3MM J TIP PTFE S

## (undated) DEVICE — CATHETER DIAG AD 4FR L110CM 145DEG COR RED HYDRPHLC NYL

## (undated) DEVICE — CATHETER ANGIO 4FR L100CM S STL NYL JL4 3 SEG BRAID SFT

## (undated) DEVICE — CATHETER ANGIO 4FR L100CM S STL NYL AL1 3 SEG BRAID SFT

## (undated) DEVICE — GLIDESHEATH SLENDER ACCESS KIT: Brand: GLIDESHEATH SLENDER

## (undated) DEVICE — SUTURE ETHIBOND EXCEL SZ 0 L30IN NONABSORBABLE GRN CT1 L36MM X424H

## (undated) DEVICE — CHECK-FLO PERFORMER INTRODUCER: Brand: CHECK-FLO

## (undated) DEVICE — ACCESS SHEATH WITH DILATOR: Brand: WATCHMAN FXD CURVE™ ACCESS SYSTEM

## (undated) DEVICE — PERCUTANEOUS ENTRY THINWALL NEEDLE  ONE-PART: Brand: COOK

## (undated) DEVICE — PERCLOSE™ PROSTYLE™ SUTURE-MEDIATED CLOSURE AND REPAIR SYSTEM: Brand: PERCLOSE™ PROSTYLE™

## (undated) DEVICE — CATHETER ANGIO 4FR L100CM COR NYL AR MOD W/O SIDE H RADPQ

## (undated) DEVICE — INTRODUCER SHTH 6FR L12CM DIA0.038IN STD HEMSTAS CLOSE TOL

## (undated) DEVICE — DEVICE COMPR LNG 27 CM VASC BND